# Patient Record
Sex: MALE | Race: WHITE | NOT HISPANIC OR LATINO | Employment: OTHER | URBAN - METROPOLITAN AREA
[De-identification: names, ages, dates, MRNs, and addresses within clinical notes are randomized per-mention and may not be internally consistent; named-entity substitution may affect disease eponyms.]

---

## 2017-07-07 ENCOUNTER — OFFICE VISIT - RIVER FALLS (OUTPATIENT)
Dept: FAMILY MEDICINE | Facility: CLINIC | Age: 62
End: 2017-07-07

## 2017-07-08 LAB
CHOLEST SERPL-MCNC: 233 MG/DL (ref 125–200)
CHOLEST/HDLC SERPL: 3.3 {RATIO}
CREAT SERPL-MCNC: 0.96 MG/DL (ref 0.7–1.25)
GLUCOSE BLD-MCNC: 85 MG/DL (ref 65–99)
HDLC SERPL-MCNC: 71 MG/DL
LDLC SERPL CALC-MCNC: 148 MG/DL
NONHDLC SERPL-MCNC: 162 MG/DL
TRIGL SERPL-MCNC: 69 MG/DL

## 2018-07-10 ENCOUNTER — OFFICE VISIT - RIVER FALLS (OUTPATIENT)
Dept: FAMILY MEDICINE | Facility: CLINIC | Age: 63
End: 2018-07-10

## 2018-07-11 LAB
CHOLEST SERPL-MCNC: 239 MG/DL
CHOLEST/HDLC SERPL: 3.4 {RATIO}
CREAT SERPL-MCNC: 1.01 MG/DL (ref 0.7–1.25)
GLUCOSE BLD-MCNC: 80 MG/DL (ref 65–139)
HDLC SERPL-MCNC: 71 MG/DL
LDLC SERPL CALC-MCNC: 153 MG/DL
NONHDLC SERPL-MCNC: 168 MG/DL
TRIGL SERPL-MCNC: 54 MG/DL

## 2019-01-11 ENCOUNTER — OFFICE VISIT - RIVER FALLS (OUTPATIENT)
Dept: FAMILY MEDICINE | Facility: CLINIC | Age: 64
End: 2019-01-11

## 2019-05-24 ENCOUNTER — OFFICE VISIT - RIVER FALLS (OUTPATIENT)
Dept: FAMILY MEDICINE | Facility: CLINIC | Age: 64
End: 2019-05-24

## 2019-05-24 ASSESSMENT — MIFFLIN-ST. JEOR: SCORE: 1741.52

## 2019-05-25 LAB
BUN SERPL-MCNC: 25 MG/DL (ref 7–25)
BUN/CREAT RATIO - HISTORICAL: NORMAL (ref 6–22)
CALCIUM SERPL-MCNC: 9.3 MG/DL (ref 8.6–10.3)
CHLORIDE BLD-SCNC: 103 MMOL/L (ref 98–110)
CO2 SERPL-SCNC: 27 MMOL/L (ref 20–32)
CREAT SERPL-MCNC: 0.98 MG/DL (ref 0.7–1.25)
EGFRCR SERPLBLD CKD-EPI 2021: 81 ML/MIN/1.73M2
GLUCOSE BLD-MCNC: 83 MG/DL (ref 65–99)
POTASSIUM BLD-SCNC: 4.4 MMOL/L (ref 3.5–5.3)
SODIUM SERPL-SCNC: 138 MMOL/L (ref 135–146)

## 2019-05-26 ENCOUNTER — COMMUNICATION - RIVER FALLS (OUTPATIENT)
Dept: FAMILY MEDICINE | Facility: CLINIC | Age: 64
End: 2019-05-26

## 2019-06-28 ENCOUNTER — COMMUNICATION - RIVER FALLS (OUTPATIENT)
Dept: FAMILY MEDICINE | Facility: CLINIC | Age: 64
End: 2019-06-28

## 2019-12-06 ENCOUNTER — OFFICE VISIT - RIVER FALLS (OUTPATIENT)
Dept: FAMILY MEDICINE | Facility: CLINIC | Age: 64
End: 2019-12-06

## 2019-12-06 ASSESSMENT — MIFFLIN-ST. JEOR: SCORE: 1721.56

## 2020-02-04 ENCOUNTER — COMMUNICATION - RIVER FALLS (OUTPATIENT)
Dept: FAMILY MEDICINE | Facility: CLINIC | Age: 65
End: 2020-02-04

## 2020-06-24 ENCOUNTER — AMBULATORY - HEALTHEAST (OUTPATIENT)
Dept: MULTI SPECIALTY CLINIC | Facility: CLINIC | Age: 65
End: 2020-06-24

## 2020-06-24 ENCOUNTER — OFFICE VISIT - RIVER FALLS (OUTPATIENT)
Dept: FAMILY MEDICINE | Facility: CLINIC | Age: 65
End: 2020-06-24

## 2020-06-24 LAB
CHOLEST SERPL-MCNC: 223 MG/DL
CREAT SERPL-MCNC: 1.01 MG/DL (ref 0.7–1.25)
GFR ESTIMATE EXT - HISTORICAL: 78 ML/MIN/1.73M2
GFR ESTIMATE, IF BLACK EXT - HISTORICAL: 90 ML/MIN/1.73M2
HDLC SERPL-MCNC: 69 MG/DL
LDLC SERPL CALC-MCNC: 139 MG/DL
NON HDL CHOL. (LDL+VLDL): 155
TRIGLYCERIDES (HISTORICAL CONVERSION): 65

## 2020-06-24 ASSESSMENT — MIFFLIN-ST. JEOR: SCORE: 1711.58

## 2020-06-25 ENCOUNTER — COMMUNICATION - RIVER FALLS (OUTPATIENT)
Dept: FAMILY MEDICINE | Facility: CLINIC | Age: 65
End: 2020-06-25

## 2020-06-25 ENCOUNTER — AMBULATORY - HEALTHEAST (OUTPATIENT)
Dept: SURGERY | Facility: TELEHEALTH | Age: 65
End: 2020-06-25

## 2020-06-25 DIAGNOSIS — K40.90 HERNIA, INGUINAL, LEFT: ICD-10-CM

## 2020-06-25 LAB
BUN SERPL-MCNC: 22 MG/DL (ref 7–25)
BUN/CREAT RATIO - HISTORICAL: NORMAL (ref 6–22)
CALCIUM SERPL-MCNC: 9.5 MG/DL (ref 8.6–10.3)
CHLORIDE BLD-SCNC: 104 MMOL/L (ref 98–110)
CHOLEST SERPL-MCNC: 223 MG/DL
CHOLEST/HDLC SERPL: 3.3 {RATIO}
CO2 SERPL-SCNC: 26 MMOL/L (ref 20–32)
CREAT SERPL-MCNC: 1.01 MG/DL (ref 0.7–1.25)
EGFRCR SERPLBLD CKD-EPI 2021: 78 ML/MIN/1.73M2
GLUCOSE BLD-MCNC: 87 MG/DL (ref 65–99)
HDLC SERPL-MCNC: 68 MG/DL
LDLC SERPL CALC-MCNC: 139 MG/DL
NONHDLC SERPL-MCNC: 155 MG/DL
POTASSIUM BLD-SCNC: 4.6 MMOL/L (ref 3.5–5.3)
SODIUM SERPL-SCNC: 140 MMOL/L (ref 135–146)
TRIGL SERPL-MCNC: 65 MG/DL

## 2020-07-02 ENCOUNTER — AMBULATORY - HEALTHEAST (OUTPATIENT)
Dept: SURGERY | Facility: CLINIC | Age: 65
End: 2020-07-02

## 2020-07-02 RX ORDER — LISINOPRIL 5 MG/1
5 TABLET ORAL DAILY
Status: SHIPPED | COMMUNITY
Start: 2020-07-02 | End: 2022-08-02

## 2020-07-02 RX ORDER — CHLORAL HYDRATE 500 MG
3 CAPSULE ORAL DAILY
Status: SHIPPED | COMMUNITY
Start: 2020-07-02

## 2020-07-06 ENCOUNTER — OFFICE VISIT - HEALTHEAST (OUTPATIENT)
Dept: SURGERY | Facility: CLINIC | Age: 65
End: 2020-07-06

## 2020-07-06 ENCOUNTER — SURGERY - HEALTHEAST (OUTPATIENT)
Dept: SURGERY | Facility: CLINIC | Age: 65
End: 2020-07-06

## 2020-07-06 DIAGNOSIS — K40.90 NON-RECURRENT INGUINAL HERNIA WITHOUT OBSTRUCTION OR GANGRENE, UNSPECIFIED LATERALITY: ICD-10-CM

## 2020-07-06 DIAGNOSIS — K40.20 BILATERAL INGUINAL HERNIA WITHOUT OBSTRUCTION OR GANGRENE, RECURRENCE NOT SPECIFIED: ICD-10-CM

## 2020-07-06 RX ORDER — LANOLIN ALCOHOL/MO/W.PET/CERES
400 CREAM (GRAM) TOPICAL
Status: SHIPPED | COMMUNITY
Start: 2020-07-06 | End: 2022-08-02

## 2020-07-08 ENCOUNTER — AMBULATORY - HEALTHEAST (OUTPATIENT)
Dept: SURGERY | Facility: CLINIC | Age: 65
End: 2020-07-08

## 2020-07-08 ENCOUNTER — COMMUNICATION - HEALTHEAST (OUTPATIENT)
Dept: SURGERY | Facility: CLINIC | Age: 65
End: 2020-07-08

## 2020-07-08 ENCOUNTER — COMMUNICATION - RIVER FALLS (OUTPATIENT)
Dept: FAMILY MEDICINE | Facility: CLINIC | Age: 65
End: 2020-07-08

## 2020-07-08 DIAGNOSIS — Z11.59 ENCOUNTER FOR SCREENING FOR OTHER VIRAL DISEASES: ICD-10-CM

## 2020-07-09 ENCOUNTER — AMBULATORY - RIVER FALLS (OUTPATIENT)
Dept: FAMILY MEDICINE | Facility: CLINIC | Age: 65
End: 2020-07-09

## 2020-07-13 ENCOUNTER — COMMUNICATION - HEALTHEAST (OUTPATIENT)
Dept: SCHEDULING | Facility: CLINIC | Age: 65
End: 2020-07-13

## 2020-07-15 ENCOUNTER — AMBULATORY - HEALTHEAST (OUTPATIENT)
Dept: FAMILY MEDICINE | Facility: CLINIC | Age: 65
End: 2020-07-15

## 2020-07-15 DIAGNOSIS — Z11.59 ENCOUNTER FOR SCREENING FOR OTHER VIRAL DISEASES: ICD-10-CM

## 2020-07-16 ENCOUNTER — ANESTHESIA - HEALTHEAST (OUTPATIENT)
Dept: SURGERY | Facility: HOSPITAL | Age: 65
End: 2020-07-16

## 2020-07-16 ASSESSMENT — MIFFLIN-ST. JEOR: SCORE: 1697.98

## 2020-07-17 ENCOUNTER — SURGERY - HEALTHEAST (OUTPATIENT)
Dept: SURGERY | Facility: HOSPITAL | Age: 65
End: 2020-07-17

## 2020-08-03 ENCOUNTER — OFFICE VISIT - HEALTHEAST (OUTPATIENT)
Dept: SURGERY | Facility: CLINIC | Age: 65
End: 2020-08-03

## 2020-08-03 DIAGNOSIS — Z48.89 POSTOPERATIVE VISIT: ICD-10-CM

## 2020-10-05 ENCOUNTER — COMMUNICATION - RIVER FALLS (OUTPATIENT)
Dept: FAMILY MEDICINE | Facility: CLINIC | Age: 65
End: 2020-10-05

## 2021-01-08 ENCOUNTER — OFFICE VISIT - RIVER FALLS (OUTPATIENT)
Dept: FAMILY MEDICINE | Facility: CLINIC | Age: 66
End: 2021-01-08

## 2021-04-27 ENCOUNTER — OFFICE VISIT - RIVER FALLS (OUTPATIENT)
Dept: FAMILY MEDICINE | Facility: CLINIC | Age: 66
End: 2021-04-27

## 2021-04-28 ENCOUNTER — COMMUNICATION - RIVER FALLS (OUTPATIENT)
Dept: FAMILY MEDICINE | Facility: CLINIC | Age: 66
End: 2021-04-28

## 2021-04-28 LAB
BUN SERPL-MCNC: 21 MG/DL (ref 7–25)
BUN/CREAT RATIO - HISTORICAL: NORMAL (ref 6–22)
CALCIUM SERPL-MCNC: 10 MG/DL (ref 8.6–10.3)
CHLORIDE BLD-SCNC: 105 MMOL/L (ref 98–110)
CHOLEST SERPL-MCNC: 228 MG/DL
CHOLEST/HDLC SERPL: 3.3 {RATIO}
CO2 SERPL-SCNC: 27 MMOL/L (ref 20–32)
CREAT SERPL-MCNC: 0.96 MG/DL (ref 0.7–1.25)
EGFRCR SERPLBLD CKD-EPI 2021: 82 ML/MIN/1.73M2
GLUCOSE BLD-MCNC: 95 MG/DL (ref 65–99)
HDLC SERPL-MCNC: 70 MG/DL
LDLC SERPL CALC-MCNC: 143 MG/DL
NONHDLC SERPL-MCNC: 158 MG/DL
POTASSIUM BLD-SCNC: 4.8 MMOL/L (ref 3.5–5.3)
SODIUM SERPL-SCNC: 139 MMOL/L (ref 135–146)
TRIGL SERPL-MCNC: 61 MG/DL

## 2021-06-04 VITALS — WEIGHT: 187 LBS | HEIGHT: 74 IN | BODY MASS INDEX: 24 KG/M2

## 2021-06-09 NOTE — ANESTHESIA CARE TRANSFER NOTE
Last vitals:   Vitals:    07/17/20 1119   BP: 158/82   Pulse: 88   Resp: 12   Temp: 36.1  C (97  F)   SpO2: 100%     Patient's level of consciousness is awake  Spontaneous respirations: yes  Maintains airway independently: yes  Dentition unchanged: yes  Oropharynx: oropharynx clear of all foreign objects    QCDR Measures:  ASA# 20 - Surgical Safety Checklist: WHO surgical safety checklist completed prior to induction    PQRS# 430 - Adult PONV Prevention: 4558F - Pt received => 2 anti-emetic agents (different classes) preop & intraop  ASA# 8 - Peds PONV Prevention: 4558F - Pt received => 2 anti-emetic agents (different classes) preop & intraop  PQRS# 424 - Marybeth-op Temp Management: 4559F - At least one body temp DOCUMENTED => 35.5C or 95.9F within required timeframe  PQRS# 426 - PACU Transfer Protocol: - Transfer of care checklist used  ASA# 14 - Acute Post-op Pain: ASA14B - Patient did NOT experience pain >= 7 out of 10

## 2021-06-09 NOTE — TELEPHONE ENCOUNTER
Spoke with Kar regarding surgery details with Dr. Zimmerman. Confirmed surgery details:    Surgery Date: Friday July 17th - Robotic left inguinal hernia repair    Location:  [facility]    Arrival Time: 7:30 AM (unless instructed otherwise by the preop nurse)    Prep:     1. Schedule a preop physical with your primary care doctor. This may be virtual or face-to-face depending on your doctors preference. Call them right away to schedule this.    2. COVID19 testing is required within 72 hours of surgery. A nurse from [facility] will contact you to arrange this. If the test result is positive, your surgery will be canceled.    3. Nothing to eat or drink for 8 hours before surgery unless instructed differently by the preop nurse.    4. No blood thinners including aspirin for one week prior to surgery. Verify this is safe for you with your primary care doctor before stopping.     5. Adult surgical patients can have one visitor only during the preoperative phase.    6. If you going home the same day as surgery, you need an adult to drive you home and stay with you 24 hours after surgery.      7. When you arrive to the hospital, you will be screened for COVID19 symptoms. If you screen positive, your surgery will need to be postponed for your safety.    8. If the community sees a new surge in COVID19 hospital admissions, your procedure may need to be postponed. We will contact you if this happens.    9. We always encourage you to notify your insurance any time you have something scheduled including surgery. The number is usually right on the back of your insurance card.     Call our office if you have any questions! Thank you!     Jennyfer Hagen  [facility], General Surgery  Surgery Scheduler  [phone] (Direct Line)  [phone] (Main Line)

## 2021-06-09 NOTE — TELEPHONE ENCOUNTER
Kar calling to schedule his pre op covid 19 test. Direct transfer to COVID scheduling team      Natalie Chiu RN  [facility] Nurse Advisor

## 2021-06-09 NOTE — ANESTHESIA POSTPROCEDURE EVALUATION
Patient: Kar López  Procedure(s):  BILATERAL HERNIORRHAPHY, INGUINAL, ROBOT-ASSISTED, LAPAROSCOPIC, USING DA CLAYTON XI (Left)  Anesthesia type: general    Patient location: PACU  Last vitals:   Vitals Value Taken Time   /68 7/17/2020 12:00 PM   Temp 36.6  C (97.9  F) 7/17/2020 11:54 AM   Pulse 64 7/17/2020 12:02 PM   Resp 14 7/17/2020 12:02 PM   SpO2 98 % 7/17/2020 12:02 PM   Vitals shown include unvalidated device data.  Post vital signs: stable  Level of consciousness: alert and conversant  Post-anesthesia pain: pain controlled  Post-anesthesia nausea and vomiting: no  Pulmonary: supplemental oxygen  Cardiovascular: stable and blood pressure at baseline  Hydration: adequate  Anesthetic events: no    QCDR Measures:  ASA# 11 - Marybeth-op Cardiac Arrest: ASA11B - Patient did NOT experience unanticipated cardiac arrest  ASA# 12 - Marybeth-op Mortality Rate: ASA12B - Patient did NOT die  ASA# 13 - PACU Re-Intubation Rate: ASA13B - Patient did NOT require a new airway mgmt  ASA# 10 - Composite Anes Safety: ASA10A - No serious adverse event    Additional Notes:

## 2021-06-09 NOTE — PROGRESS NOTES
HPI:  Kar López is a 64 y.o. male who was referred to me by Demond Tuttle MD for an inguinal hernia. He  presents today with complaints of left inguinal bulge for several months.  He noted the bulge began after falling during the wintertime.  Since then he has had progressive worsening of pain as well as increase in size of the bulge.  Denies any fevers, chills, nausea or vomiting.    Allergies:Patient has no known allergies.    Past Medical History:   Diagnosis Date     Chronic low back pain      DJD (degenerative joint disease)      Hypertension        Past Surgical History:   Procedure Laterality Date     COLONOSCOPY  09/29/2004, 06/28/2012     TOTAL HIP ARTHROPLASTY  1995       CURRENT MEDS:  Current Outpatient Medications   Medication Sig Dispense Refill     bimatoprost (LUMIGAN) 0.01 % Drop Apply 1 drop to eye.       calcium-vitamin D 250-100 mg-unit per tablet Take 1 tablet by mouth daily. Calcium Citrate       cholecalciferol, vitamin D3, (VITAMIN D3) 25 mcg (1,000 unit) capsule Take 1,000 Units by mouth daily.       fish oil-omega-3 fatty acids (FISH OIL) 300-1,000 mg capsule Take 2 g by mouth daily.       folic acid (FOLVITE) 400 MCG tablet Take 400 mcg by mouth.       lisinopriL (PRINIVIL,ZESTRIL) 5 MG tablet Take 5 mg by mouth daily.       multivitamin therapeutic tablet Take 1 tablet by mouth daily.       No current facility-administered medications for this visit.        Family history-reviewed and noncontributory to the current admission     reports that he has never smoked. He has never used smokeless tobacco. He reports current alcohol use.    Review of Systems -   The 12 system review is within normal limits except for as mentioned above.  General ROS: No complaints or constitutional symptoms  Ophthalmic ROS: No complaints of visual changes  Skin: No complaints or symptoms   Endocrine: No complaints or symptoms  Hematologic/Lymphatic: No symptoms or complaints  Psychiatric: No  symptoms or complaints  Respiratory ROS: no cough, shortness of breath, or wheezing  Cardiovascular ROS: no chest pain or dyspnea on exertion  Gastrointestinal ROS: As per HPI  Genito-Urinary ROS: no dysuria, trouble voiding, or hematuria  Musculoskeletal ROS: no joint or muscle pain  Neurological ROS: no TIA or stroke symptoms    There were no vitals taken for this visit.  There is no height or weight on file to calculate BMI.    EXAM:  GENERAL: Well developed male, No acute distress, pleasant and conversant   EYES: Pupils equal, round and reactive, no scleral icterus  CARDIAC: Regular rate and rhythm, no obvious murmurs noted  CHEST/LUNG: Clear to ascultation bilaterally, No ronchi, No wheezes  ABDOMEN: Soft, nontender, palpable left inguinal hernia reducible, no evidence of a right inguinal hernia  SKIN: Pink, warm and dry, no obvious rashes or lesions   NEURO:No focal deficits, ambulatory  MUSCULOSKELETAL:No obvious deformities, no swelling, normal appearing      Assessment/Plan:   Kar López is a 64 y.o. male with a left inguinal hernia.  I have discussed the pathophysiology of inguinal hernias at length as well as the  surgical and non-operative management strategies.      The risks of Robotic, Laparoscopic and open inguinal hernia  surgery were explained in detail which include, but are not limited to, bleeding, infection of the mesh, recurrence of the hernia, chronic pain, poor cosmesis, the need for reoperative intervention, the possibility of conversion from a laparoscopic approach to an open approach, subcutaneous emphysema, injury to vital structures,  blood clots, heart attack, stroke and death.  Additionally, the risks of observation were also discussed in detail which include, but are not limited to, chronic pain, enlargement of the hernia, incarceration, strangulation and death.      He understands everything that was discussed and has consented to proceed with surgery.   We will plan on scheduling  a robotic left possible right inguinal hernia repair at his desired date.       Samuel Zimmerman D.O. [facility]  [phone]  [facility] Department of Surgery

## 2021-06-10 NOTE — PROGRESS NOTES
Kar López is status post robotic inguinal hernia repair. He is doing well with no post operative incisional pain.  He is tolerating a regular diet, ambulating with minimal pain, denies any recurrent bulges and has no other complaints at present.     EXAM:  There were no vitals taken for this visit.  GENERAL: Well developed male, No acute distress, pleasant and conversant   EYES: Pupils equal, round and reactive, no scleral icterus  ABDOMEN: Well-healed port site incisions x3  SKIN: Pink, warm and dry, no obvious rashes or lesions   NEURO:No focal deficits, ambulatory  MUSCULOSKELETAL:No obvious deformities, no swelling, normal appearing      ASSESSMENT AND PLAN:  Kar López is doing well postoperatively.  He may continue with the recommended diet and light activities as tolerated. I have encouraged him to refrain from any heavy lifting over 20 pounds and strenuous activities or stretching for a total of 3 weeks from his surgery.   He may now follow up on a prn basis if he has any other questions or concerns.     Samuel Zimmerman D.O., FACS  [phone]  [facility] Department of Surgery

## 2021-06-15 ENCOUNTER — AMBULATORY - RIVER FALLS (OUTPATIENT)
Dept: FAMILY MEDICINE | Facility: CLINIC | Age: 66
End: 2021-06-15

## 2021-06-16 PROBLEM — K40.20 BILATERAL INGUINAL HERNIA WITHOUT OBSTRUCTION OR GANGRENE, RECURRENCE NOT SPECIFIED: Status: ACTIVE | Noted: 2020-07-08

## 2021-06-16 LAB — SARS-COV-2 RNA RESP QL NAA+PROBE: NEGATIVE

## 2021-07-03 NOTE — ANESTHESIA PREPROCEDURE EVALUATION
Anesthesia Preprocedure Evaluation by Payam Rojas MD at 7/17/2020  8:14 AM     Author: Payam Rojas MD Service: -- Author Type: Physician    Filed: 7/17/2020  8:17 AM Date of Service: 7/17/2020  8:14 AM Status: Signed    : Payam Rojas MD (Physician)       Anesthesia Evaluation      Patient summary reviewed   No history of anesthetic complications     Airway   Mallampati: II  Neck ROM: full   Pulmonary - negative ROS and normal exam                          Cardiovascular - normal exam  Exercise tolerance: > or = 4 METS  (+) hypertension, , hypercholesterolemia,      Neuro/Psych    (+) chronic pain    Endo/Other    (+) arthritis,      GI/Hepatic/Renal - negative ROS      Other findings: Has had multiple hip surgeries, states back on floor he had issues with hypotension. GFR nl.  Covid tyest 7/15 negative.        Dental                             Anesthesia Plan  Planned anesthetic: general endotracheal  Ketamine 0.5 mg/kg after induction.  ASA 2   Induction: intravenous   Anesthetic plan and risks discussed with: patient  Anesthesia plan special considerations: antiemetics,   Post-op plan: routine recovery

## 2021-08-03 ENCOUNTER — OFFICE VISIT - RIVER FALLS (OUTPATIENT)
Dept: FAMILY MEDICINE | Facility: CLINIC | Age: 66
End: 2021-08-03

## 2021-08-03 ASSESSMENT — MIFFLIN-ST. JEOR: SCORE: 1731.54

## 2021-10-26 ENCOUNTER — OFFICE VISIT - RIVER FALLS (OUTPATIENT)
Dept: FAMILY MEDICINE | Facility: CLINIC | Age: 66
End: 2021-10-26

## 2021-10-26 ASSESSMENT — MIFFLIN-ST. JEOR: SCORE: 1727

## 2022-02-07 ENCOUNTER — COMMUNICATION - RIVER FALLS (OUTPATIENT)
Dept: FAMILY MEDICINE | Facility: CLINIC | Age: 67
End: 2022-02-07
Payer: COMMERCIAL

## 2022-02-12 VITALS
SYSTOLIC BLOOD PRESSURE: 100 MMHG | WEIGHT: 192 LBS | HEIGHT: 75 IN | BODY MASS INDEX: 23.87 KG/M2 | HEART RATE: 61 BPM | DIASTOLIC BLOOD PRESSURE: 65 MMHG

## 2022-02-12 VITALS
TEMPERATURE: 97.1 F | DIASTOLIC BLOOD PRESSURE: 80 MMHG | BODY MASS INDEX: 24.39 KG/M2 | SYSTOLIC BLOOD PRESSURE: 132 MMHG | HEART RATE: 51 BPM | WEIGHT: 190 LBS

## 2022-02-12 VITALS
BODY MASS INDEX: 23.85 KG/M2 | TEMPERATURE: 97.1 F | WEIGHT: 190.8 LBS | SYSTOLIC BLOOD PRESSURE: 127 MMHG | HEART RATE: 61 BPM | DIASTOLIC BLOOD PRESSURE: 75 MMHG

## 2022-02-12 VITALS
BODY MASS INDEX: 23.6 KG/M2 | SYSTOLIC BLOOD PRESSURE: 148 MMHG | HEART RATE: 65 BPM | HEIGHT: 75 IN | WEIGHT: 189.8 LBS | TEMPERATURE: 97.5 F | DIASTOLIC BLOOD PRESSURE: 73 MMHG

## 2022-02-12 VITALS
HEIGHT: 75 IN | SYSTOLIC BLOOD PRESSURE: 128 MMHG | BODY MASS INDEX: 24.15 KG/M2 | WEIGHT: 194.2 LBS | DIASTOLIC BLOOD PRESSURE: 75 MMHG | TEMPERATURE: 97.3 F | HEART RATE: 56 BPM

## 2022-02-12 VITALS
HEART RATE: 56 BPM | SYSTOLIC BLOOD PRESSURE: 145 MMHG | TEMPERATURE: 97 F | DIASTOLIC BLOOD PRESSURE: 84 MMHG | WEIGHT: 192.1 LBS | BODY MASS INDEX: 24.66 KG/M2

## 2022-02-12 VITALS
SYSTOLIC BLOOD PRESSURE: 118 MMHG | HEIGHT: 75 IN | WEIGHT: 187.6 LBS | BODY MASS INDEX: 23.32 KG/M2 | HEART RATE: 72 BPM | TEMPERATURE: 97.5 F | DIASTOLIC BLOOD PRESSURE: 71 MMHG

## 2022-02-12 VITALS
TEMPERATURE: 98 F | HEART RATE: 59 BPM | SYSTOLIC BLOOD PRESSURE: 115 MMHG | BODY MASS INDEX: 24.42 KG/M2 | DIASTOLIC BLOOD PRESSURE: 69 MMHG | WEIGHT: 195.4 LBS

## 2022-02-12 VITALS
BODY MASS INDEX: 24.22 KG/M2 | TEMPERATURE: 97.7 F | DIASTOLIC BLOOD PRESSURE: 67 MMHG | HEART RATE: 65 BPM | SYSTOLIC BLOOD PRESSURE: 119 MMHG | WEIGHT: 193.8 LBS

## 2022-02-12 VITALS
HEIGHT: 75 IN | HEART RATE: 54 BPM | SYSTOLIC BLOOD PRESSURE: 138 MMHG | BODY MASS INDEX: 23.75 KG/M2 | DIASTOLIC BLOOD PRESSURE: 81 MMHG | WEIGHT: 191 LBS

## 2022-02-15 NOTE — PROGRESS NOTES
Patient:   SVETLANA MARCOS            MRN: [id number]            FIN: [id number]               Age:   65 years     Sex:  Male     :  1955   Associated Diagnoses:   Glaucoma   Author:   Demond Tuttle MD      Preoperative Information   Indication for surgery:  Glaucoma.    Accompanied by:  No one.    Source of history:  Self.    History limitation:  None.       Chief Complaint   2021 10:45 AM CDT   Preop.  Micropulse Surgery.  21.  [facility].        Review of Systems   Constitutional:  Negative.    Eye:  Negative, glaucoma.    Ear/Nose/Mouth/Throat:  Negative.    Respiratory:  Negative.    Cardiovascular:  Negative.    Gastrointestinal:  Negative.    Genitourinary:  Negative.    Hematology/Lymphatics:  Negative.    Endocrine:  Negative.    Immunologic:  Negative.    Musculoskeletal:  Negative.    Integumentary:  Negative.    Neurologic:  Negative.       Health Status   Allergies:    Allergic Reactions (Selected)  No Known Medication Allergies   Medications:  (Selected)   Prescriptions  Prescribed  lisinopril 5 mg oral tablet: = 1 tab(s), Oral, daily, # 90 tab(s), 1 Refill(s), Type: Maintenance, Pharmacy: [id number], 1 tab(s) Oral daily, 75, in, 20 10:42:00 CDT, Height Measured, 190, lb, 21 10:46:00 CST, Weight Measured  Documented Medications  Documented  Daily Multiple Vitamins: PO, Daily, 0  Fish Oil oral capsule: 0  Flax Seed Oil: 0  Lutein: PO, Daily, 0  Rocklatan 0.02%-0.005% ophthalmic solution: 1 drop(s), Eye-Left, qpm, 0 Refill(s), Type: Maintenance  Simbrinza 1%- 0.2% ophthalmic suspension: 1 drop(s), Eye-Left, bid  Vitamin D: ( 1,000 unit(s) ), PO, 0  calcium (as calcium citrate) 250 mg oral tablet: = 1 tab(s) ( 250 mg ), Oral, daily, 0 Refill(s), Type: Maintenance,    Medications          *denotes recorded medication          *Simbrinza 1%- 0.2% ophthalmic suspension: 1 drop(s), Eye-Left, bid.          *calcium (as calcium citrate) 250 mg oral  tablet: 250 mg, 1 tab(s), Oral, daily, 0 Refill(s).          *Vitamin D: 1,000 unit(s), PO.          *Flax Seed Oil: 0.          *Rocklatan 0.02%-0.005% ophthalmic solution: 1 drop(s), Eye-Left, qpm, 0 Refill(s).          lisinopril 5 mg oral tablet: 1 tab(s), Oral, daily, 90 tab(s), 1 Refill(s).          *Lutein: PO, Daily.          *Daily Multiple Vitamins: PO, Daily.          *Fish Oil oral capsule: 0.       Problem list:    All Problems  Benign essential HTN / SNOMED CT 9040687 / Confirmed  Chronic Low Back Pain / ICD-9-.2 / Confirmed  DJD (Degenerative Joint Disease) of Lumbar Spine / ICD-9-.3 / Confirmed  Elevated lipids / SNOMED CT 209116227 / Confirmed  Statin declined / SNOMED CT 323391106 / Confirmed      Histories   Past Medical History:    Active  Benign essential HTN (4044653): Onset on 2011 at 55 years.  Chronic Low Back Pain (724.2): Onset on 2010 at 55 years.  DJD (Degenerative Joint Disease) of Lumbar Spine (721.3)   Family History:    HEADACHE  Mother  Sister  CVA - Cerebrovascular accident  Mother  PVD - Peripheral vascular disease  Father ()     Procedure history:    Repair of inguinal hernia - Bilateral (SNOMED CT 63758498) on 2020 at 64 Years.  Colonoscopy (SNOMED CT 739486258) on 2012 at 56 Years.  Comments:  7/10/2012 2:06 PM CDT - Daphne Lino MA  Normal colonoscopy repeat in 10 years  Colonoscopy (SNOMED CT 963905460) on 2004 at 48 Years.  Comments:  2010 11:08 AM LILYT - Lucia Middleton  rev. 2009  Total hip replacement (SNOMED CT 129860724) in  at 40 Years.  Comments:  2010 11:07 AM SHELLEY - Lucia Middleton  right hip - redue  Total hip replacement (SNOMED CT 145079965) in  at 32 Years.  Comments:  2010 11:07 AM LILYT - Lucia Middleton  right hip   Social History:        Electronic Cigarette/Vaping Assessment            Electronic Cigarette Use: Never.      Alcohol Assessment: Current      Tobacco  Assessment            Never (less than 100 in lifetime)      Exercise and Physical Activity Assessment            Exercise frequency: Active.       Has  no history of anemia.  Has no personal history of bleeding problems.   Has no personal or family history of anesthesia reactions.  S/he has not taken aspirin or aspirin containing products in the last week.     S/he  has not been on corticosteroids for more than 2 weeks recently.    Chest pain / SOB walking up 2 flights of steps?no  CAD MI?no    Asthma  or Bronchitis? no  Diabetes? no       Insulin/Orals?          Physical Examination   Vital Signs   4/27/2021 10:45 AM CDT Temperature Tympanic 97.0 DegF  LOW    Peripheral Pulse Rate 56 bpm  LOW    HR Method Electronic    Systolic Blood Pressure 145 mmHg  HI    Diastolic Blood Pressure 84 mmHg  HI    Mean Arterial Pressure 104 mmHg    BP Method Electronic      Measurements from flowsheet : Measurements   4/27/2021 10:45 AM CDT   Weight Measured - Standard                192.1 lb     General:  Alert and oriented, No acute distress.    Eye:  Pupils are equal, round and reactive to light, Extraocular movements are intact.    HENT:  Normocephalic, Tympanic membranes are clear, Oral mucosa is moist.    Neck:  Supple, Non-tender, No lymphadenopathy, No thyromegaly.    Respiratory:  Lungs are clear to auscultation, Respirations are non-labored, Breath sounds are equal.    Cardiovascular:  Normal rate, Regular rhythm, No murmur, Good pulses equal in all extremities, No edema.    Gastrointestinal:  Soft.    Musculoskeletal:  No tenderness, No swelling, No deformity.    Integumentary:  Warm, Dry.    Neurologic:  Alert, Oriented, No focal deficits, Cranial Nerves II-XII are grossly intact.    Psychiatric:  Cooperative, Appropriate mood & affect, Normal judgment.       Review / Management            Impression and Plan   Diagnosis     Glaucoma ([id number] H40.9).     Condition:  ok for surgery asa2.

## 2022-02-15 NOTE — NURSING NOTE
Comprehensive Intake Entered On:  4/27/2021 10:51 AM CDT    Performed On:  4/27/2021 10:45 AM CDT by Kimmy Drummond               Summary   Chief Complaint :   Preop.  Micropulse Surgery.  4/30/21.  [facility].    Weight Measured :   192.1 lb(Converted to: 192 lb 2 oz, 87.135 kg)    Systolic Blood Pressure :   145 mmHg (HI)    Diastolic Blood Pressure :   84 mmHg (HI)    Mean Arterial Pressure :   104 mmHg   Peripheral Pulse Rate :   56 bpm (LOW)    BP Method :   Electronic   HR Method :   Electronic   Temperature Tympanic :   97.0 DegF(Converted to: 36.1 DegC)  (LOW)    Kimmy Drummond - 4/27/2021 10:45 AM CDT   Health Status   Allergies Verified? :   Yes   Medication History Verified? :   Yes   Pre-Visit Planning Status :   Completed   Tobacco Use? :   Never smoker   Kimmy Drummond - 4/27/2021 10:45 AM CDT   Meds / Allergies   (As Of: 4/27/2021 10:51:43 AM CDT)   Allergies (Active)   No Known Medication Allergies  Estimated Onset Date:   Unspecified ; Created By:   Jesika Hale CMA; Reaction Status:   Active ; Category:   Drug ; Substance:   No Known Medication Allergies ; Type:   Allergy ; Updated By:   Jesika Hale CMA; Reviewed Date:   1/8/2021 11:20 AM CST        Medication List   (As Of: 4/27/2021 10:51:43 AM CDT)   Prescription/Discharge Order    lisinopril  :   lisinopril ; Status:   Prescribed ; Ordered As Mnemonic:   lisinopril 5 mg oral tablet ; Simple Display Line:   1 tab(s), Oral, daily, 90 tab(s), 1 Refill(s) ; Ordering Provider:   Demond Tuttle MD; Catalog Code:   lisinopril ; Order Dt/Tm:   1/8/2021 11:19:14 AM CST            Home Meds    brimonidine-brinzolamide ophthalmic  :   brimonidine-brinzolamide ophthalmic ; Status:   Documented ; Ordered As Mnemonic:   Simbrinza 1%- 0.2% ophthalmic suspension ; Simple Display Line:   1 drop(s), Eye-Left, bid ; Catalog Code:   brimonidine-brinzolamide ophthalmic ; Order Dt/Tm:   4/27/2021 10:48:38 AM CDT           latanoprost-netarsudil ophthalmic  :   latanoprost-netarsudil ophthalmic ; Status:   Documented ; Ordered As Mnemonic:   Rocklatan 0.02%-0.005% ophthalmic solution ; Simple Display Line:   1 drop(s), Eye-Left, qpm, 0 Refill(s) ; Catalog Code:   latanoprost-netarsudil ophthalmic ; Order Dt/Tm:   4/27/2021 10:48:38 AM CDT          calcium citrate  :   calcium citrate ; Status:   Documented ; Ordered As Mnemonic:   calcium (as calcium citrate) 250 mg oral tablet ; Simple Display Line:   250 mg, 1 tab(s), Oral, daily, 0 Refill(s) ; Catalog Code:   calcium citrate ; Order Dt/Tm:   12/6/2019 2:22:31 PM CST          ergocalciferol  :   ergocalciferol ; Status:   Documented ; Ordered As Mnemonic:   Vitamin D ; Simple Display Line:   1,000 unit(s), PO ; Catalog Code:   ergocalciferol ; Order Dt/Tm:   2/9/2010 6:07:01 PM CST          lutein  :   lutein ; Status:   Documented ; Ordered As Mnemonic:   Lutein ; Simple Display Line:   PO, Daily ; Catalog Code:   lutein ; Order Dt/Tm:   2/9/2010 6:07:42 PM CST          flax  :   flax ; Status:   Documented ; Ordered As Mnemonic:   Flax Seed Oil ; Catalog Code:   flax ; Order Dt/Tm:   2/9/2010 6:06:30 PM CST          omega-3 polyunsaturated fatty acids  :   omega-3 polyunsaturated fatty acids ; Status:   Documented ; Ordered As Mnemonic:   Fish Oil oral capsule ; Catalog Code:   omega-3 polyunsaturated fatty acids ; Order Dt/Tm:   2/9/2010 6:06:06 PM CST          multivitamin  :   multivitamin ; Status:   Documented ; Ordered As Mnemonic:   Daily Multiple Vitamins ; Simple Display Line:   PO, Daily ; Catalog Code:   multivitamin ; Order Dt/Tm:   1/28/2010 1:48:22 PM CST            ID Risk Screen   Recent Travel History :   No recent travel   Family Member Travel History :   No recent travel   Other Exposure to Infectious Disease :   Unknown   COVID-19 Testing Status :   No positive COVID-19 test   Kimmy Drummond - 4/27/2021 10:45 AM CDT   Social History   Social History   (As  Of: 4/27/2021 10:51:43 AM CDT)   Alcohol:  Current      (Last Updated: 4/23/2010 11:08:37 AM CDT by Lucia Robertson CMA )         Tobacco:        Never (less than 100 in lifetime)   (Last Updated: 1/8/2021 10:47:12 AM CST by Jesika Hale CMA)          Electronic Cigarette/Vaping:        Electronic Cigarette Use: Never.   (Last Updated: 1/8/2021 10:47:21 AM CST by Jesika Hale CMA)          Exercise:        Exercise frequency: Active.   (Last Updated: 12/6/2011 1:00:21 PM CST by Jayla Sepulveda)

## 2022-02-15 NOTE — PROGRESS NOTES
Patient:   SVETLANA MARCOS            MRN: [id number]            FIN: [id number]               Age:   63 years     Sex:  Male     :  1955   Associated Diagnoses:   Glaucoma   Author:   Demond Tuttle MD      Preoperative Information   Indication for surgery:  Glaucoma left eye.       Chief Complaint   2019 3:56 PM CDT    Preop.  Left eye surgery. Dr. Watts.  [facility] 19        Review of Systems   Constitutional:  Negative.    Eye:  Negative.    Ear/Nose/Mouth/Throat:  Negative.    Respiratory:  Negative.    Cardiovascular:  Negative.    Gastrointestinal:  Negative.    Genitourinary:  Negative.    Hematology/Lymphatics:  Negative.    Endocrine:  Negative.    Immunologic:  Negative.    Musculoskeletal:  Negative.    Integumentary:  Negative.    Neurologic:  Negative.       Health Status   Allergies:    Allergic Reactions (Selected)  No known allergies   Medications:  (Selected)   Prescriptions  Prescribed  lisinopril 5 mg oral tablet: = 1 tab(s) ( 5 mg ), po, bid, # 180 tab(s), 1 Refill(s), Type: Maintenance, Pharmacy: [id number], 1 tab(s) Oral bid  Documented Medications  Documented  Daily Multiple Vitamins: PO, Daily, 0  Fish Oil oral capsule: 0  Flax Seed Oil: 0  Lumigan 0.01% ophthalmic solution: 1 drop(s), left eye, qpm, 0 Refill(s), Type: Maintenance  Lutein: PO, Daily, 0  Vitamin D: ( 1,000 unit(s) ), PO, 0  amoxicillin 500 mg oral tablet: See Instructions, Instructions: 4 tabs 1 hr prior to dental work., 0 Refill(s), Type: Soft Stop,    Medications          *denotes recorded medication          *amoxicillin 500 mg oral tablet: See Instructions, 4 tabs 1 hr prior to dental work..          *Lumigan 0.01% ophthalmic solution: 1 drop(s), left eye, qpm.          *Vitamin D: 1,000 unit(s), PO.          *Flax Seed Oil: 0.          lisinopril 5 mg oral tablet: 5 mg, 1 tab(s), po, bid, 180 tab(s), 1 Refill(s).          *Lutein: PO, Daily.          *Daily Multiple  Vitamins: PO, Daily.          *Fish Oil oral capsule: 0.     Problem list:    All Problems  Benign essential HTN / SNOMED CT 8125703 / Confirmed  Chronic Low Back Pain / ICD-9-.2 / Confirmed  DJD (Degenerative Joint Disease) of Lumbar Spine / ICD-9-.3 / Confirmed      Histories   Past Medical History:    Active  Benign essential HTN (9195470): Onset on 2011 at 55 years.  Chronic Low Back Pain (724.2): Onset on 2010 at 55 years.  DJD (Degenerative Joint Disease) of Lumbar Spine (721.3)   Family History:    HEADACHE  Mother  Sister  CVA - Cerebrovascular accident  Mother  PVD - Peripheral vascular disease  Father ()     Procedure history:    Colonoscopy (SNOMED CT 569637202) on 2012 at 56 Years.  Comments:  7/10/2012 2:06 PM CDT - Daphne Lino MA  Normal colonoscopy repeat in 10 years  Colonoscopy (SNOMED CT 142680416) on 2004 at 48 Years.  Comments:  2010 11:08 AM CDT - Lucia Middleton  rev. 2009  Total hip replacement (SNOMED CT 254418902) in  at 40 Years.  Comments:  2010 11:07 AM CDT - Lucia Middleton  right hip - redue  Total hip replacement (SNOMED CT 663002444) in  at 32 Years.  Comments:  2010 11:07 AM CDT - Lucia Middleton  right hip   Social History:        Alcohol Assessment: Current      Tobacco Assessment: Denies Tobacco Use      Exercise and Physical Activity Assessment            Exercise frequency: Active.     Has  no history of anemia.  Has no personal history of bleeding problems.   Has no personal or family history of anesthesia reactions.  S/he has not taken aspirin or aspirin containing products in the last week.     S/he has / has not been on corticosteroids for more than 2 weeks recently.    Chest pain / SOB walking up 2 flights of steps?no  CAD MI?no    Asthma  or Bronchitis? no  Diabetes? no       Insulin/Orals?          Physical Examination   Vital Signs   2019 3:56 PM CDT Temperature Tympanic 97.3 DegF   LOW    Peripheral Pulse Rate 56 bpm  LOW    HR Method Electronic    Systolic Blood Pressure 128 mmHg    Diastolic Blood Pressure 75 mmHg    Mean Arterial Pressure 93 mmHg    BP Method Electronic      Measurements from flowsheet : Measurements   5/24/2019 3:56 PM CDT Height Measured - Standard 75 in    Weight Measured - Standard 194.2 lb    BSA 2.16 m2    Body Mass Index 24.27 kg/m2      General:  Alert and oriented, No acute distress.    Eye:  Pupils are equal, round and reactive to light, Extraocular movements are intact.    HENT:  Normocephalic, Tympanic membranes are clear, Normal hearing, Oral mucosa is moist.    Neck:  Supple, Non-tender, No carotid bruit, No jugular venous distention, No lymphadenopathy, No thyromegaly.    Respiratory:  Lungs are clear to auscultation, Respirations are non-labored, Breath sounds are equal.    Cardiovascular:  Normal rate, Regular rhythm, No murmur, Good pulses equal in all extremities, No edema.    Gastrointestinal:  Soft, Non-tender, Non-distended, Normal bowel sounds, No organomegaly.    Musculoskeletal:  No tenderness, No swelling, No deformity.    Integumentary:  Warm, Dry.    Neurologic:  Alert, Oriented, Normal sensory, Normal motor function, No focal deficits, Cranial Nerves II-XII are grossly intact, Normal deep tendon reflexes.    Psychiatric:  Cooperative, Appropriate mood & affect, Normal judgment.       Review / Management            Impression and Plan   Diagnosis     Glaucoma (YAG69-ZH H40.9).     Condition:  ok for surgery asa2.

## 2022-02-15 NOTE — LETTER
(Inserted Image. Unable to display)   June 25, 2020      SVETLANA MARCOS      [address]   [place], [place] [id number]        Dear SVETLANA,    Thank you for selecting [facility] for your healthcare needs.  Below you will find the results of the recent tests done at our clinic.     All labs acceptable.      Result Name Current Result Previous Result Reference Range   Potassium Level (mmol/L)  4.6 6/24/2020  4.4 5/24/2019 3.5 - 5.3   Glucose Level (mg/dL)  87 6/24/2020  83 5/24/2019 65 - 99   Creatinine Level (mg/dL)  1.01 6/24/2020  0.98 5/24/2019 0.70 - 1.25   Cholesterol (mg/dL) ((H)) 223 6/24/2020 ((H)) 239 7/10/2018  - <200   HDL (mg/dL)  68 6/24/2020  71 7/10/2018 > OR = 40 -    LDL ((H)) 139 6/24/2020 ((H)) 153 7/10/2018    Triglyceride (mg/dL)  65 6/24/2020  54 7/10/2018  - <150       Please contact me or my assistant at [phone] if you have any questions.     Sincerely,        Demond Tuttle MD        What do your labs mean?  Below is a glossary of commonly ordered labs:  LDL   Bad Cholesterol   HDL   Good Cholesterol  AST/ALT   Liver Function   Cr/Creatinine   Kidney Function  Microalbumin   Kidney Function  BUN   Kidney Function  PSA   Prostate    TSH   Thyroid Hormone  HgbA1c   Diabetes Test   Hgb (Hemoglobin)   Red Blood Cells

## 2022-02-15 NOTE — NURSING NOTE
Quick Intake Entered On:  8/3/2021 2:56 PM CDT    Performed On:  8/3/2021 2:55 PM CDT by Demond Tuttle MD               Summary   Height Measured :   75 in(Converted to: 6 ft 3 in, 190.50 cm)    Systolic Blood Pressure :   100 mmHg   Diastolic Blood Pressure :   65 mmHg   Mean Arterial Pressure :   77 mmHg   Vital Signs Comments :   home   Demond Tuttle MD - 8/3/2021 2:55 PM CDT

## 2022-02-15 NOTE — PROGRESS NOTES
Patient:   SVETLANA MARCOS            MRN: [id number]            FIN: [id number]               Age:   61 years     Sex:  Male     :  1955   Associated Diagnoses:   Chronic Low Back Pain; HTN   Author:   Demond Tuttle MD      Visit Information      Date of Service: 2017 04:23 pm  Performing Location: [facility]  Encounter#: [id number]      Primary Care Provider (PCP):  Demond Tuttle MD    NPI# [id number]      Referring Provider:  Demond Tuttle MD    NPI# [id number]      Chief Complaint   2017 4:25 PM CDT     HTN checkup.        History of Present Illness   Overall things have been stable. His significant other is a nurse her name is Dottie    The patient is in today for his back.  and htn. chief complaint and symptoms as noted above confirmed with patient ..  He has the long-term back problems from previous injuries.  He has gone through [facility] twice   ..  He continues to mckoy it.  Tolerating ace well  .  He has had no other associated symptoms.        Review of Systems   Constitutional:  No weakness, No fatigue, No decreased activity, No weight gain.    Eye:  No recent visual problem, No blurring, No double vision.    Ear/Nose/Mouth/Throat:  Negative.    Respiratory:  No shortness of breath, No cough.    Cardiovascular:  No chest pain, No peripheral edema.    Gastrointestinal:  No nausea, No vomiting, No diarrhea, No constipation, No abdominal pain.    Genitourinary:  No dysuria, No hematuria, No change in urine stream.    Hematology/Lymphatics:  Negative.    Endocrine:  Negative.    Immunologic:  Negative.    Musculoskeletal:  Negative except as documented in history of present illness.    Integumentary:  No rash.    Neurologic:  Alert and oriented X4.    Psychiatric:  Negative.             Health Status   Allergies:    Allergic Reactions (Selected)  No known allergies   Medications:  (Selected)   Prescriptions  Prescribed  lisinopril 5 mg oral  tablet: 1 tab(s) ( 5 mg ), po, bid, # 180 tab(s), 3 Refill(s), Type: Maintenance, Pharmacy: [id number], 1 tab(s) po bid  Documented Medications  Documented  Daily Multiple Vitamins: PO, Daily, 0  Fish Oil oral capsule: 0  Flax Seed Oil: 0  Lumigan 0.01% ophthalmic solution: 1 drop(s), left eye, qpm, 0 Refill(s), Type: Maintenance  Lutein: PO, Daily, 0  Vitamin D: ( 1,000 unit(s) ), PO, 0  amoxicillin 500 mg oral tablet: See Instructions, Instructions: 4 tabs 1 hr prior to dental work., 0 Refill(s), Type: Soft Stop   Problem list:    All Problems  DJD (Degenerative Joint Disease) of Lumbar Spine / ICD-9-.3 / Confirmed  Chronic Low Back Pain / ICD-9-.2 / Confirmed  Benign essential HTN / SNOMED CT 0783417 / Confirmed      Histories   Past Medical History:    Active  Benign essential HTN (3722978): Onset on 2011 at 55 years.  Chronic Low Back Pain (724.2): Onset on 2010 at 55 years.  DJD (Degenerative Joint Disease) of Lumbar Spine (721.3)   Family History:    HEADACHE  Mother  Sister  CVA - Cerebrovascular accident  Mother  PVD - Peripheral vascular disease  Father ()     Procedure history:    Colonoscopy (SNOMED CT 841186660) on 2012 at 56 Years.  Comments:  7/10/2012 2:06 PM - Daphne Lino MA  Normal colonoscopy repeat in 10 years  Colonoscopy (SNOMED CT 773666753) on 2004 at 48 Years.  Comments:  2010 11:08 AM - Lucia Middleton  rev. 2009  Total hip replacement (SNOMED CT 629127210) in  at 40 Years.  Comments:  2010 11:07 AM - Lucia Middleton  right hip - redue  Total hip replacement (SNOMED CT 807252696) in  at 32 Years.  Comments:  2010 11:07 AM - Lucia Middleton  right hip   Social History:        Alcohol Assessment: Current      Tobacco Assessment: Denies Tobacco Use      Exercise and Physical Activity Assessment            Exercise frequency: Active.        Physical Examination   Vital Signs   2017 4:25 PM CDT  Temperature Tympanic 98.0 DegF    Peripheral Pulse Rate 59 bpm  LOW    Systolic Blood Pressure 115 mmHg    Diastolic Blood Pressure 69 mmHg    Mean Arterial Pressure 84 mmHg      Measurements from flowsheet : Measurements   7/7/2017 4:25 PM CDT     Weight Measured - Standard                195.4 lb     General:  Alert and oriented, No acute distress.    Eye:  Normal conjunctiva.    HENT:  No pharyngeal erythema.    Neck:  Supple, No carotid bruit, No lymphadenopathy, No thyromegaly.    Respiratory:  Lungs are clear to auscultation, Respirations are non-labored.    Cardiovascular:  Normal rate, Regular rhythm, No murmur, Normal peripheral perfusion, No edema.    Gastrointestinal:  Soft, Non-tender, No organomegaly.    Genitourinary:  No costovertebral angle tenderness.    Musculoskeletal:  degenerative changes noted.    Integumentary:  No rash.    Neurologic:  Alert, Oriented.       Review / Management   Results review      Impression and Plan   Diagnosis     Chronic Low Back Pain (PTQ63-IX M54.5).     HTN (BGI00-NZ I10).     Course:  Progressing as expected.    Plan:       Follow-up: With Primary Care Provider, In 12 months.    Patient Instructions:       Counseled: Patient, Regarding diagnosis, Regarding treatment, Regarding medications, Diet, Activity, Verbalized understanding.

## 2022-02-15 NOTE — PROGRESS NOTES
Patient:   SVETLANA MARCOS            MRN: [id number]            FIN: [id number]               Age:   64 years     Sex:  Male     :  1955   Associated Diagnoses:   Chronic Low Back Pain; HTN; Hernia, inguinal, left   Author:   Demond Tuttle MD      Visit Information      Date of Service: 2020 10:36 am  Performing Location: [facility]  Encounter#: [id number]      Primary Care Provider (PCP):  Demond Tuttle MD    NPI# [id number]      Referring Provider:  Demond Tuttle MD, NPI# [id number]      Chief Complaint      History of Present Illness   Overall things have been stable. Now retired Here for 6-month follow-up.  He is now retired.  Struggles a bit with feeling a bit lost now that he is retired.  Specially with the pandemic.  His significant other Dottie continues work full-time as a nurse.  Blood pressures been controlled.  Still struggles with his back from the arthritis.  He does have leg length discrepancy which he manages with a insert.  He did fall on the ice last winter and is noted a left inguinal lump since then leg is bigger but not too painful.         Review of Systems   Constitutional:  No weakness, No fatigue, No decreased activity, No weight gain.    Eye:  No recent visual problem, No blurring, No double vision.    Ear/Nose/Mouth/Throat:  Negative.    Respiratory:  No shortness of breath, No cough.    Cardiovascular:  No chest pain, No peripheral edema.    Gastrointestinal:  Negative except as documented in history of present illness, No nausea, No vomiting, No diarrhea, No constipation, No abdominal pain.    Genitourinary:  No dysuria, No hematuria, No change in urine stream.    Hematology/Lymphatics:  Negative.    Endocrine:  Negative.    Immunologic:  Negative.    Musculoskeletal:  Negative except as documented in history of present illness.    Integumentary:  No rash.    Neurologic:  Alert and oriented X4.    Psychiatric:  Negative.              Health Status    Allergies:    Allergic Reactions (Selected)  No known allergies   Medications:  (Selected)   Prescriptions  Prescribed  lisinopril 5 mg oral tablet: = 1 tab(s) ( 5 mg ), po, daily, # 180 tab(s), 1 Refill(s), Type: Maintenance, Pharmacy: [facility]  Documented Medications  Documented  Daily Multiple Vitamins: PO, Daily, 0  Fish Oil oral capsule: 0  Flax Seed Oil: 0  Lutein: PO, Daily, 0  Vitamin D: ( 1,000 unit(s) ), PO, 0  calcium (as calcium citrate) 250 mg oral tablet: = 1 tab(s) ( 250 mg ), Oral, daily, 0 Refill(s), Type: Maintenance   Problem list:    All Problems  Benign essential HTN / SNOMED CT 8944912 / Confirmed  Chronic Low Back Pain / ICD-9-.2 / Confirmed  DJD (Degenerative Joint Disease) of Lumbar Spine / ICD-9-.3 / Confirmed      Histories   Past Medical History:    Active  Benign essential HTN (4331193): Onset on 2011 at 55 years.  Chronic Low Back Pain (724.2): Onset on 2010 at 55 years.  DJD (Degenerative Joint Disease) of Lumbar Spine (721.3)   Family History:    HEADACHE  Mother  Sister  CVA - Cerebrovascular accident  Mother  PVD - Peripheral vascular disease  Father ()     Procedure history:    Colonoscopy (SNOMED CT 519048719) on 2012 at 56 Years.  Comments:  7/10/2012 2:06 PM CDT - Daphne Lino MA  Normal colonoscopy repeat in 10 years  Colonoscopy (SNOMED CT 596999252) on 2004 at 48 Years.  Comments:  2010 11:08 AM LILYT - Lucia Middleton  rev. 2009  Total hip replacement (SNOMED CT 828805251) in  at 40 Years.  Comments:  2010 11:07 AM LILYT - Lucia Middleton  right hip - redue  Total hip replacement (SNOMED CT 895927984) in  at 32 Years.  Comments:  2010 11:07 AM SHELLEY - Lucia Middleton  right hip   Social History:        Alcohol Assessment: Current      Tobacco Assessment: Denies Tobacco Use      Exercise and Physical Activity Assessment            Exercise frequency: Active.        Physical Examination    VS/Measurements   General:  Alert and oriented, No acute distress.    Eye:  Normal conjunctiva.    HENT:  No pharyngeal erythema.    Neck:  Supple, No carotid bruit, No lymphadenopathy, No thyromegaly.    Respiratory:  Lungs are clear to auscultation, Respirations are non-labored.    Cardiovascular:  Normal rate, Regular rhythm, No murmur, Normal peripheral perfusion, No edema.    Gastrointestinal:  Soft, Non-tender, No organomegaly, lih.    Genitourinary:  No costovertebral angle tenderness.    Musculoskeletal:  degenerative changes noted.    Integumentary:  No rash.    Neurologic:  Alert, Oriented.       Review / Management   Results review      Impression and Plan   Diagnosis     Chronic Low Back Pain (BXY29-RO M54.5).     HTN (PVB74-GQ I10).     Hernia, inguinal, left (BGJ36-PG K40.90).     Course:  Progressing as expected.    Plan:  Overall doing well we will plan on seeing him back in 6 months.  We will put in surgical referral for the hernia.  Continue with medications as directed  .    Patient Instructions:       Counseled: Patient, Regarding diagnosis, Regarding treatment, Regarding medications, Diet, Activity, Verbalized understanding.    Addendum: Patient is cleared for upcoming hernia surgery ASA 2.  He has no history of anesthesia problems or clotting problems in the past.  EKG is pending.  Recent labs normal

## 2022-02-15 NOTE — NURSING NOTE
Comprehensive Intake Entered On:  12/6/2019 2:22 PM CST    Performed On:  12/6/2019 2:18 PM CST by Candida Everett               Summary   Chief Complaint :   HTN med check   Weight Measured :   189.8 lb(Converted to: 189 lb 13 oz, 86.09 kg)    Height Measured :   75 in(Converted to: 6 ft 3 in, 190.50 cm)    Body Mass Index :   23.72 kg/m2   Body Surface Area :   2.13 m2   Systolic Blood Pressure :   148 mmHg (HI)    Diastolic Blood Pressure :   73 mmHg   Mean Arterial Pressure :   98 mmHg   Peripheral Pulse Rate :   65 bpm   BP Site :   Right arm   Pulse Site :   Brachial artery   BP Method :   Electronic   HR Method :   Electronic   Temperature Tympanic :   97.5 DegF(Converted to: 36.4 DegC)  (LOW)    Candida Everett - 12/6/2019 2:18 PM CST   Health Status   Allergies Verified? :   Yes   Medication History Verified? :   Yes   Medical History Verified? :   Yes   Pre-Visit Planning Status :   Completed   Tobacco Use? :   Never smoker   Candida Everett - 12/6/2019 2:18 PM CST   Consents   Consent for Immunization Exchange :   Consent Granted   Consent for Immunizations to Providers :   Consent Granted   Candida Everett - 12/6/2019 2:18 PM CST   Meds / Allergies   (As Of: 12/6/2019 2:22:52 PM CST)   Allergies (Active)   No known allergies  Estimated Onset Date:   Unspecified ; Created By:   Lucia Middleton; Reaction Status:   Active ; Category:   Drug ; Substance:   No known allergies ; Type:   Allergy ; Updated By:   Lucia Middleton; Source:   Paper Chart ; Reviewed Date:   12/6/2019 2:21 PM CST        Medication List   (As Of: 12/6/2019 2:22:52 PM CST)   Prescription/Discharge Order    lisinopril  :   lisinopril ; Status:   Prescribed ; Ordered As Mnemonic:   lisinopril 5 mg oral tablet ; Simple Display Line:   5 mg, 1 tab(s), po, bid, 180 tab(s), 1 Refill(s) ; Ordering Provider:   Demond Tuttle MD; Catalog Code:   lisinopril ; Order Dt/Tm:   5/24/2019 4:15:05 PM CDT            Home Meds     calcium citrate  :   calcium citrate ; Status:   Documented ; Ordered As Mnemonic:   calcium (as calcium citrate) 250 mg oral tablet ; Simple Display Line:   250 mg, 1 tab(s), Oral, daily, 0 Refill(s) ; Catalog Code:   calcium citrate ; Order Dt/Tm:   12/6/2019 2:22:31 PM CST          bimatoprost ophthalmic  :   bimatoprost ophthalmic ; Status:   Completed ; Ordered As Mnemonic:   Lumigan 0.01% ophthalmic solution ; Simple Display Line:   1 drop(s), left eye, qpm ; Catalog Code:   bimatoprost ophthalmic ; Order Dt/Tm:   6/9/2015 5:19:12 PM CDT          amoxicillin  :   amoxicillin ; Status:   Completed ; Ordered As Mnemonic:   amoxicillin 500 mg oral tablet ; Simple Display Line:   See Instructions, 4 tabs 1 hr prior to dental work. ; Catalog Code:   amoxicillin ; Order Dt/Tm:   6/12/2012 4:28:19 PM CDT          ergocalciferol  :   ergocalciferol ; Status:   Documented ; Ordered As Mnemonic:   Vitamin D ; Simple Display Line:   1,000 unit(s), PO ; Catalog Code:   ergocalciferol ; Order Dt/Tm:   2/9/2010 6:07:01 PM CST          lutein  :   lutein ; Status:   Documented ; Ordered As Mnemonic:   Lutein ; Simple Display Line:   PO, Daily ; Catalog Code:   lutein ; Order Dt/Tm:   2/9/2010 6:07:42 PM CST          flax  :   flax ; Status:   Documented ; Ordered As Mnemonic:   Flax Seed Oil ; Catalog Code:   flax ; Order Dt/Tm:   2/9/2010 6:06:30 PM CST          omega-3 polyunsaturated fatty acids  :   omega-3 polyunsaturated fatty acids ; Status:   Documented ; Ordered As Mnemonic:   Fish Oil oral capsule ; Catalog Code:   omega-3 polyunsaturated fatty acids ; Order Dt/Tm:   2/9/2010 6:06:06 PM CST          multivitamin  :   multivitamin ; Status:   Documented ; Ordered As Mnemonic:   Daily Multiple Vitamins ; Simple Display Line:   PO, Daily ; Catalog Code:   multivitamin ; Order Dt/Tm:   1/28/2010 1:48:22 PM CST

## 2022-02-15 NOTE — NURSING NOTE
Comprehensive Intake Entered On:  5/24/2019 3:59 PM CDT    Performed On:  5/24/2019 3:56 PM CDT by Kimmy Drummond               Summary   Chief Complaint :   Preop.  Left eye surgery. Dr. Watts.  [facility]. 5/29/19   Weight Measured :   194.2 lb(Converted to: 194 lb 3 oz, 88.09 kg)    Height Measured :   75 in(Converted to: 6 ft 3 in, 190.50 cm)    Body Mass Index :   24.27 kg/m2   Body Surface Area :   2.16 m2   Systolic Blood Pressure :   128 mmHg   Diastolic Blood Pressure :   75 mmHg   Mean Arterial Pressure :   93 mmHg   Peripheral Pulse Rate :   56 bpm (LOW)    BP Method :   Electronic   HR Method :   Electronic   Temperature Tympanic :   97.3 DegF(Converted to: 36.3 DegC)  (LOW)    Kimmy Drummond - 5/24/2019 3:56 PM CDT   Health Status   Allergies Verified? :   Yes   Medication History Verified? :   Yes   Tobacco Use? :   Never smoker   Kimmy Drummond - 5/24/2019 3:56 PM CDT   Meds / Allergies   (As Of: 5/24/2019 3:59:01 PM CDT)   Allergies (Active)   No known allergies  Estimated Onset Date:   Unspecified ; Created By:   Lucia Robertson CMA; Reaction Status:   Active ; Category:   Drug ; Substance:   No known allergies ; Type:   Allergy ; Updated By:   Lucia Robertson CMA; Source:   Paper Chart ; Reviewed Date:   7/10/2018 5:54 PM CDT        Medication List   (As Of: 5/24/2019 3:59:01 PM CDT)   Prescription/Discharge Order    lisinopril  :   lisinopril ; Status:   Prescribed ; Ordered As Mnemonic:   lisinopril 5 mg oral tablet ; Simple Display Line:   5 mg, 1 tab(s), po, bid, 180 tab(s), 3 Refill(s) ; Ordering Provider:   Demond Tuttle MD; Catalog Code:   lisinopril ; Order Dt/Tm:   7/10/2018 5:42:51 PM            Home Meds    bimatoprost ophthalmic  :   bimatoprost ophthalmic ; Status:   Documented ; Ordered As Mnemonic:   Lumigan 0.01% ophthalmic solution ; Simple Display Line:   1 drop(s), left eye, qpm ; Catalog Code:   bimatoprost ophthalmic ; Order Dt/Tm:    6/9/2015 5:19:12 PM          amoxicillin  :   amoxicillin ; Status:   Documented ; Ordered As Mnemonic:   amoxicillin 500 mg oral tablet ; Simple Display Line:   See Instructions, 4 tabs 1 hr prior to dental work. ; Catalog Code:   amoxicillin ; Order Dt/Tm:   6/12/2012 4:28:19 PM          ergocalciferol  :   ergocalciferol ; Status:   Documented ; Ordered As Mnemonic:   Vitamin D ; Simple Display Line:   1,000 unit(s), PO ; Catalog Code:   ergocalciferol ; Order Dt/Tm:   2/9/2010 6:07:01 PM          lutein  :   lutein ; Status:   Documented ; Ordered As Mnemonic:   Lutein ; Simple Display Line:   PO, Daily ; Catalog Code:   lutein ; Order Dt/Tm:   2/9/2010 6:07:42 PM          flax  :   flax ; Status:   Documented ; Ordered As Mnemonic:   Flax Seed Oil ; Catalog Code:   flax ; Order Dt/Tm:   2/9/2010 6:06:30 PM          omega-3 polyunsaturated fatty acids  :   omega-3 polyunsaturated fatty acids ; Status:   Documented ; Ordered As Mnemonic:   Fish Oil oral capsule ; Catalog Code:   omega-3 polyunsaturated fatty acids ; Order Dt/Tm:   2/9/2010 6:06:06 PM          multivitamin  :   multivitamin ; Status:   Documented ; Ordered As Mnemonic:   Daily Multiple Vitamins ; Simple Display Line:   PO, Daily ; Catalog Code:   multivitamin ; Order Dt/Tm:   1/28/2010 1:48:22 PM

## 2022-02-15 NOTE — PROGRESS NOTES
Patient:   SVETLANA MARCOS            MRN: [id number]            FIN: [id number]               Age:   66 years     Sex:  Male     :  1955   Associated Diagnoses:   Glaucoma   Author:   Demond Tuttle MD      Preoperative Information   Indication for surgery:  Glaucoma.    Accompanied by:  No one.    Source of history:  Self.    History limitation:  None.       Chief Complaint   10/26/2021 10:34 AM CDT  DOS 21 at [facility] in [place] with Dr. Chelsea Marina for L eye tube shunt. Fax to [phone].        Review of Systems   Constitutional:  Negative.    Eye:  Negative, glaucoma.    Ear/Nose/Mouth/Throat:  Negative.    Respiratory:  Negative.    Cardiovascular:  Negative.    Gastrointestinal:  Negative.    Genitourinary:  Negative.    Hematology/Lymphatics:  Negative.    Endocrine:  Negative.    Immunologic:  Negative.    Musculoskeletal:  Negative.    Integumentary:  Negative.    Neurologic:  Negative.       Health Status   Allergies:    Allergic Reactions (Selected)  No Known Medication Allergies   Medications:  (Selected)   Prescriptions  Prescribed  lisinopril 5 mg oral tablet: = 1 tab(s), Oral, daily, # 90 tab(s), 1 Refill(s), Type: Maintenance, Pharmacy: [facility][id number], 1 tab(s) Oral daily, 75, in, 21 14:55:00 CDT, Height Measured, 192, lb, 21 14:46:00 CDT, Weight Measured  Documented Medications  Documented  Daily Multiple Vitamins: PO, Daily, 0  Fish Oil oral capsule: 0  Flax Seed Oil: 0  Lutein: PO, Daily, 0  Rocklatan 0.02%-0.005% ophthalmic solution: 1 drop(s), Eye-Left, qpm, 0 Refill(s), Type: Maintenance  Simbrinza 1%- 0.2% ophthalmic suspension: 1 drop(s), Eye-Left, bid  Vitamin D: ( 1,000 unit(s) ), PO, 0  calcium (as calcium citrate) 250 mg oral tablet: = 1 tab(s) ( 250 mg ), Oral, daily, 0 Refill(s), Type: Maintenance,    Medications          *denotes recorded medication          *Simbrinza 1%- 0.2% ophthalmic suspension: 1 drop(s), Eye-Left, bid.           *calcium (as calcium citrate) 250 mg oral tablet: 250 mg, 1 tab(s), Oral, daily, 0 Refill(s).          *Vitamin D: 1,000 unit(s), PO.          *Flax Seed Oil: 0.          *Rocklatan 0.02%-0.005% ophthalmic solution: 1 drop(s), Eye-Left, qpm, 0 Refill(s).          lisinopril 5 mg oral tablet: 1 tab(s), Oral, daily, 90 tab(s), 1 Refill(s).          *Lutein: PO, Daily.          *Daily Multiple Vitamins: PO, Daily.          *Fish Oil oral capsule: 0.       Problem list:    All Problems  Elevated lipids / SNOMED CT 198996568 / Confirmed  Statin declined / SNOMED CT 427744589 / Confirmed  Benign essential HTN / SNOMED CT 4484448 / Confirmed  DJD (Degenerative Joint Disease) of Lumbar Spine / ICD-9-.3 / Confirmed  Chronic Low Back Pain / ICD-9-.2 / Confirmed      Histories   Past Medical History:    Active  Benign essential HTN (9606931): Onset on 2011 at 55 years.  Chronic Low Back Pain (724.2): Onset on 2010 at 55 years.  DJD (Degenerative Joint Disease) of Lumbar Spine (721.3)   Family History:    HEADACHE  Mother  Sister  CVA - Cerebrovascular accident  Mother  PVD - Peripheral vascular disease  Father ()     Procedure history:    Repair of inguinal hernia - Bilateral (SNOMED CT 60732395) on 2020 at 64 Years.  Colonoscopy (SNOMED CT 615000451) on 2012 at 56 Years.  Comments:  7/10/2012 2:06 PM CDT - Daphne Lino MA  Normal colonoscopy repeat in 10 years  Colonoscopy (SNOMED CT 855401553) on 2004 at 48 Years.  Comments:  2010 11:08 AM L[facility] - Lucia Middleton  rev. 2009  Total hip replacement (SNOMED CT 334474878) in  at 40 Years.  Comments:  2010 11:07 AM SHELLEY - Lucia Middleton  right hip - redue  Total hip replacement (SNOMED CT 748754095) in  at 32 Years.  Comments:  2010 11:07 AM L[facility] - Lucia Middleton  right hip   Social History:        Electronic Cigarette/Vaping Assessment            Electronic Cigarette Use: Never.      Alcohol  Assessment: Current      Tobacco Assessment            Never (less than 100 in lifetime)      Exercise and Physical Activity Assessment            Exercise frequency: Active.       Has  no history of anemia.  Has no personal history of bleeding problems.   Has no personal or family history of anesthesia reactions.  S/he has not taken aspirin or aspirin containing products in the last week.     S/he  has not been on corticosteroids for more than 2 weeks recently.    Chest pain / SOB walking up 2 flights of steps?no  CAD MI?no    Asthma  or Bronchitis? no  Diabetes? no       Insulin/Orals?          Physical Examination   Vital Signs   10/26/2021 10:34 AM CDT Peripheral Pulse Rate 54 bpm  LOW    Pulse Site Radial artery    HR Method Electronic    Systolic Blood Pressure 138 mmHg  HI    Diastolic Blood Pressure 81 mmHg  HI    Mean Arterial Pressure 100 mmHg    BP Site Right arm    BP Method Electronic      Measurements from flowsheet : Measurements   10/26/2021 10:34 AM CDT Height Measured - Standard 75 in    Weight Measured - Standard 191 lb    BSA 2.14 m2    Body Mass Index 23.87 kg/m2      General:  Alert and oriented, No acute distress.    Eye:  Pupils are equal, round and reactive to light, Extraocular movements are intact.    HENT:  Normocephalic, Oral mucosa is moist.    Neck:  Supple, Non-tender, No lymphadenopathy, No thyromegaly.    Respiratory:  Lungs are clear to auscultation, Respirations are non-labored, Breath sounds are equal.    Cardiovascular:  Normal rate, Regular rhythm, No murmur, Good pulses equal in all extremities, No edema.    Gastrointestinal:  Soft.    Musculoskeletal:  No tenderness, No swelling, No deformity.    Integumentary:  Warm, Dry.    Neurologic:  Alert, Oriented, No focal deficits, Cranial Nerves II-XII are grossly intact.    Psychiatric:  Cooperative, Appropriate mood & affect, Normal judgment.       Review / Management            Impression and Plan   Diagnosis     Glaucoma  (RWO69-TM H40.9).     Condition:  ok for surgery asa2.

## 2022-02-15 NOTE — NURSING NOTE
Comprehensive Intake Entered On:  1/11/2019 4:27 PM CST    Performed On:  1/11/2019 4:24 PM CST by Kimmy Drummond               Summary   Chief Complaint :   Preop.  [facility]. Left eye Cataract 1/23/19.  Dr. Howard.     Weight Measured :   190.8 lb(Converted to: 190 lb 13 oz, 86.55 kg)    Systolic Blood Pressure :   127 mmHg   Diastolic Blood Pressure :   75 mmHg   Mean Arterial Pressure :   92 mmHg   Peripheral Pulse Rate :   61 bpm   BP Method :   Electronic   HR Method :   Electronic   Temperature Tympanic :   97.1 DegF(Converted to: 36.2 DegC)  (LOW)    Kimmy Drummond - 1/11/2019 4:24 PM CST   Health Status   Allergies Verified? :   Yes   Medication History Verified? :   Yes   Pre-Visit Planning Status :   Completed   Tobacco Use? :   Never smoker   Kimmy Drummond - 1/11/2019 4:24 PM CST

## 2022-02-15 NOTE — LETTER
(Inserted Image. Unable to display)   July 12, 2021    SVETLANA MARCOS  [address] [place] [address]            Dear SVETLANA,      Thank you for selecting [facility] for your healthcare needs.    Our records indicate you are due for the following services:     Hypertension check ~ please remember to bring your at-home blood pressure readings with you to your appointment.     (FYI   Regarding office visits: In some instances, a video visit or telephone visit may be offered as an option.)      To schedule an appointment or if you have further questions, please contact your clinic at [phone].      Powered by [employer]    Sincerely,    Demond Tuttle MD

## 2022-02-15 NOTE — TELEPHONE ENCOUNTER
---------------------  From: Ashli Elkins LPN (Phone Messages Pool ([address] [place] [address]))   To: TFS Message Pool ([address] [place] [address]);     Sent: 6/26/2020 11:45:21 AM CDT  Subject: prednisone/referral     Phone Message    PCP:   MICHELE      Time of Call:  11:14       Person Calling:  pt  Phone number:  [phone]    Note:   Pt LM stating he seen TFS this week and forgot to ask for a refill on  his prednisone 10mg. Pt would like it sent to [facility].    Pt also says him and TFS talked about his referral- pt says he has not heard from anyone to schedule. Per Referral note referral was going to be entered into [facility] for [facility] to contact to schedule.    Last office visit and reason:  6/24/20 HTN---------------------  From: Jesika Hale CMA (TFS Message Pool ([address] [place] [address]))   To: Referral Coordinators Pool ([address] [place] [address]);     Cc: Demond Tuttle MD;      Sent: 6/26/2020 2:55:19 PM CDT  Subject: FW: prednisone/referral---------------------  From: Kathy Montiel (Referral Coordinators Pool ([address] [place] [address]))   To: TFS Message Pool ([address] [place] [address]);     Sent: 6/26/2020 3:25:17 PM CDT  Subject: RE: prednisone/referral     Liliana in [place] entered today in [facility], does take a few days.Noted.---------------------  From: Demond Tuttle MD   To: TFS Message Pool ([address] [place] [address]);     Sent: 6/26/2020 3:45:29 PM CDT  Subject: RE: prednisone/referral     ok to ref prednisone

## 2022-02-15 NOTE — TELEPHONE ENCOUNTER
---------------------  From: Jesika Hale CMA (Phone Messages Pool ([id number]))   Sent: 10/5/2020 11:18:01 AM CDT  Subject: Phone Message     Phone Message    PCP:   MICHELE      Time of Call:  1042       Person Calling:  Pt  Phone number:  [phone], LDM    Returned call at: 0224    Note:   Calls to see if TFS recommends he get a shingles vaccine. Per HPILLY Jin yes he should get the shingles vaccine since hes over 50 years old since he has medicare insurance should be done at the pharmacy. Notified pt.    Last office visit and reason:  06/24/20 HTN TFSPt LM at 12:19pm and wants to update us and let us know he had this done today at [facility]

## 2022-02-15 NOTE — TELEPHONE ENCOUNTER
Entered by Lashell Perez CMA on December 29, 2020 4:17:35 AM CST  ---------------------  From: Lashell Perez CMA   To: [facility] #[id number]    Sent: 12/29/2020 4:17:35 AM CST  Subject: Medication Management     ** Submitted: **  Order:lisinopril (lisinopril 5 mg oral tablet)  1 tab(s)  Oral  daily  Qty:  30 tab(s)        Refills:  0          Substitutions Allowed     Route To [facility] #[id number]    Signed by Lashell Perez CMA  12/29/2020 10:16:00 AM [facility]    ** Submitted: **  Complete:lisinopril (lisinopril 5 mg oral tablet)   Signed by Lashell Perez CMA  12/29/2020 10:17:00 AM [facility]    ** Not Approved:  **  lisinopril (LISINOPRIL 5MG TABLETS)  TAKE 1 TABLET BY MOUTH DAILY  Qty:  90 tab(s)        Days Supply:  90        Refills:  0          Substitutions Allowed     Route To [facility] - [facility] #[id number]   Note from Pharmacy:  **Patient requests 90 days supply**  Signed by Lashell Perez CMA            ------------------------------------------  From: [facility] #[id number]  To: Demond Tuttle MD  Sent: December 28, 2020 3:26:06 PM CST  Subject: Medication Management  Due: December 24, 2020 1:58:33 PM CST     ** On Hold Pending Signature **     Dispensed Drug: lisinopril (lisinopril 5 mg oral tablet), TAKE 1 TABLET BY MOUTH DAILY  Quantity: 90 tab(s)  Days Supply: 90  Refills: 0  Substitutions Allowed  Notes from Pharmacy: **Patient requests 90 days supply**  ------------------------------------------

## 2022-02-15 NOTE — NURSING NOTE
Comprehensive Intake Entered On:  8/3/2021 2:50 PM CDT    Performed On:  8/3/2021 2:46 PM CDT by Galina Lawton LPN               Summary   Chief Complaint :   HTN check, medication refill    Weight Measured :   192 lb(Converted to: 192 lb 0 oz, 87.090 kg)    Height Measured :   75 in(Converted to: 6 ft 3 in, 190.50 cm)    Body Mass Index :   24 kg/m2   Body Surface Area :   2.14 m2   Systolic Blood Pressure :   152 mmHg (HI)    Diastolic Blood Pressure :   84 mmHg (HI)    Mean Arterial Pressure :   107 mmHg   Peripheral Pulse Rate :   61 bpm   BP Site :   Right arm   Pulse Site :   Brachial artery   BP Method :   Electronic   HR Method :   Electronic   Galina Lawton LPN - 8/3/2021 2:46 PM CDT   Health Status   Allergies Verified? :   Yes   Medication History Verified? :   Yes   Pre-Visit Planning Status :   Completed   Tobacco Use? :   Never smoker   Galina Lawton LPN - 8/3/2021 2:46 PM CDT   Consents   Consent for Immunization Exchange :   Consent Granted   Consent for Immunizations to Providers :   Consent Granted   Galina Lawton LPN - 8/3/2021 2:46 PM CDT   Meds / Allergies   (As Of: 8/3/2021 2:50:22 PM CDT)   Allergies (Active)   No Known Medication Allergies  Estimated Onset Date:   Unspecified ; Created By:   Jesika Hale CMA; Reaction Status:   Active ; Category:   Drug ; Substance:   No Known Medication Allergies ; Type:   Allergy ; Updated By:   Jesika Hale CMA; Reviewed Date:   4/27/2021 11:02 AM CDT        Medication List   (As Of: 8/3/2021 2:50:22 PM CDT)   Prescription/Discharge Order    lisinopril  :   lisinopril ; Status:   Prescribed ; Ordered As Mnemonic:   lisinopril 5 mg oral tablet ; Simple Display Line:   1 tab(s), Oral, daily, 90 tab(s), 1 Refill(s) ; Ordering Provider:   Demond Tuttle MD; Catalog Code:   lisinopril ; Order Dt/Tm:   1/8/2021 11:19:14 AM [facility]            Home Meds    brimonidine-brinzolamide ophthalmic  :   brimonidine-brinzolamide ophthalmic ;  Status:   Documented ; Ordered As Mnemonic:   Simbrinza 1%- 0.2% ophthalmic suspension ; Simple Display Line:   1 drop(s), Eye-Left, bid ; Catalog Code:   brimonidine-brinzolamide ophthalmic ; Order Dt/Tm:   4/27/2021 10:48:38 AM CDT          calcium citrate  :   calcium citrate ; Status:   Documented ; Ordered As Mnemonic:   calcium (as calcium citrate) 250 mg oral tablet ; Simple Display Line:   250 mg, 1 tab(s), Oral, daily, 0 Refill(s) ; Catalog Code:   calcium citrate ; Order Dt/Tm:   12/6/2019 2:22:31 PM CST          ergocalciferol  :   ergocalciferol ; Status:   Documented ; Ordered As Mnemonic:   Vitamin D ; Simple Display Line:   1,000 unit(s), PO ; Catalog Code:   ergocalciferol ; Order Dt/Tm:   2/9/2010 6:07:01 PM CST          flax  :   flax ; Status:   Documented ; Ordered As Mnemonic:   Flax Seed Oil ; Catalog Code:   flax ; Order Dt/Tm:   2/9/2010 6:06:30 PM CST          latanoprost-netarsudil ophthalmic  :   latanoprost-netarsudil ophthalmic ; Status:   Documented ; Ordered As Mnemonic:   Rocklatan 0.02%-0.005% ophthalmic solution ; Simple Display Line:   1 drop(s), Eye-Left, qpm, 0 Refill(s) ; Catalog Code:   latanoprost-netarsudil ophthalmic ; Order Dt/Tm:   4/27/2021 10:48:38 AM CDT          lutein  :   lutein ; Status:   Documented ; Ordered As Mnemonic:   Lutein ; Simple Display Line:   PO, Daily ; Catalog Code:   lutein ; Order Dt/Tm:   2/9/2010 6:07:42 PM CST          multivitamin  :   multivitamin ; Status:   Documented ; Ordered As Mnemonic:   Daily Multiple Vitamins ; Simple Display Line:   PO, Daily ; Catalog Code:   multivitamin ; Order Dt/Tm:   1/28/2010 1:48:22 PM CST          omega-3 polyunsaturated fatty acids  :   omega-3 polyunsaturated fatty acids ; Status:   Documented ; Ordered As Mnemonic:   Fish Oil oral capsule ; Catalog Code:   omega-3 polyunsaturated fatty acids ; Order Dt/Tm:   2/9/2010 6:06:06 PM CST

## 2022-02-15 NOTE — NURSING NOTE
Comprehensive Intake Entered On:  10/26/2021 10:40 AM CDT    Performed On:  10/26/2021 10:34 AM CDT by Lilian Hinson CMA               Summary   Chief Complaint :   DOS 21 at [facility] in [place] with Dr. Chelsea Marina for L eye tube shunt. Fax to [phone].   Weight Measured :   191 lb(Converted to: 191 lb 0 oz, 86.636 kg)    Height Measured :   75 in(Converted to: 6 ft 3 in, 190.50 cm)    Body Mass Index :   23.87 kg/m2   Body Surface Area :   2.14 m2   Systolic Blood Pressure :   138 mmHg (HI)    Diastolic Blood Pressure :   81 mmHg (HI)    Mean Arterial Pressure :   100 mmHg   Peripheral Pulse Rate :   54 bpm (LOW)    BP Site :   Right arm   Pulse Site :   Radial artery   BP Method :   Electronic   HR Method :   Electronic   Lilian Hinson CMA - 10/26/2021 10:34 AM CDT   Health Status   Allergies Verified? :   Yes   Medication History Verified? :   Yes   Pre-Visit Planning Status :   Completed   Lilian Hinson CMA - 10/26/2021 10:34 AM CDT   Demographics   Last Name :   Rene   Address :   [address]    First Name :   Kar Gale Initial :   JOLLY   Responsible Party Date of Birth () :   1955 CDT   City :   [place]   State :   [place]   Zip Code :   [address]   Contact Relationship to Patient (other) :   Patient   Joya Lilian WHITTAKER - 10/26/2021 10:34 AM CDT   Consents   Consent for Immunization Exchange :   Consent Granted   Consent for Immunizations to Providers :   Consent Granted   Lilian Hinson CMA - 10/26/2021 10:34 AM CDT   Meds / Allergies   (As Of: 10/26/2021 10:40:29 AM CDT)   Allergies (Active)   No Known Medication Allergies  Estimated Onset Date:   Unspecified ; Created By:   Jesika Hale CMA; Reaction Status:   Active ; Category:   Drug ; Substance:   No Known Medication Allergies ; Type:   Allergy ; Updated By:   Jesika Hale CMA; Reviewed Date:   8/3/2021 3:13 PM CDT        Medication List   (As Of: 10/26/2021 10:40:29 AM CDT)   Prescription/Discharge Order     lisinopril  :   lisinopril ; Status:   Prescribed ; Ordered As Mnemonic:   lisinopril 5 mg oral tablet ; Simple Display Line:   1 tab(s), Oral, daily, 90 tab(s), 1 Refill(s) ; Ordering Provider:   Demond Tuttle MD; Catalog Code:   lisinopril ; Order Dt/Tm:   8/3/2021 5:03:02 PM CDT            Home Meds    brimonidine-brinzolamide ophthalmic  :   brimonidine-brinzolamide ophthalmic ; Status:   Documented ; Ordered As Mnemonic:   Simbrinza 1%- 0.2% ophthalmic suspension ; Simple Display Line:   1 drop(s), Eye-Left, bid ; Catalog Code:   brimonidine-brinzolamide ophthalmic ; Order Dt/Tm:   4/27/2021 10:48:38 AM CDT          calcium citrate  :   calcium citrate ; Status:   Documented ; Ordered As Mnemonic:   calcium (as calcium citrate) 250 mg oral tablet ; Simple Display Line:   250 mg, 1 tab(s), Oral, daily, 0 Refill(s) ; Catalog Code:   calcium citrate ; Order Dt/Tm:   12/6/2019 2:22:31 PM CST          ergocalciferol  :   ergocalciferol ; Status:   Documented ; Ordered As Mnemonic:   Vitamin D ; Simple Display Line:   1,000 unit(s), PO ; Catalog Code:   ergocalciferol ; Order Dt/Tm:   2/9/2010 6:07:01 PM CST          flax  :   flax ; Status:   Documented ; Ordered As Mnemonic:   Flax Seed Oil ; Catalog Code:   flax ; Order Dt/Tm:   2/9/2010 6:06:30 PM CST          latanoprost-netarsudil ophthalmic  :   latanoprost-netarsudil ophthalmic ; Status:   Documented ; Ordered As Mnemonic:   Rocklatan 0.02%-0.005% ophthalmic solution ; Simple Display Line:   1 drop(s), Eye-Left, qpm, 0 Refill(s) ; Catalog Code:   latanoprost-netarsudil ophthalmic ; Order Dt/Tm:   4/27/2021 10:48:38 AM CDT          lutein  :   lutein ; Status:   Documented ; Ordered As Mnemonic:   Lutein ; Simple Display Line:   PO, Daily ; Catalog Code:   lutein ; Order Dt/Tm:   2/9/2010 6:07:42 PM CST          multivitamin  :   multivitamin ; Status:   Documented ; Ordered As Mnemonic:   Daily Multiple Vitamins ; Simple Display Line:   PO, Daily ;  Catalog Code:   multivitamin ; Order Dt/Tm:   1/28/2010 1:48:22 PM CST          omega-3 polyunsaturated fatty acids  :   omega-3 polyunsaturated fatty acids ; Status:   Documented ; Ordered As Mnemonic:   Fish Oil oral capsule ; Catalog Code:   omega-3 polyunsaturated fatty acids ; Order Dt/Tm:   2/9/2010 6:06:06 PM CST

## 2022-02-15 NOTE — NURSING NOTE
Pt called to say that his 1st procedure did not need a covid test but his 2nd procedure is requiring a covid test... call transferred to set up [facility] appt and instructed pt to have the facility fax us an order for the covid test being that his PCP-TFS is out of clinic today

## 2022-02-15 NOTE — TELEPHONE ENCOUNTER
---------------------  From: Kimmy Drummond (TFS Message Pool ([id number]))   To: Appointment Pool ([address] [place] [address]);     Sent: 6/17/2020 11:37:06 AM CDT  Subject: General Message     Please call patient to schedule face to face visit with TFS.  (labs to be done same day, no need for separate lab visit)     Due for: BMP and Lipid      ---------------------  From: Ameena Kwon (Recall  Pool ([id number]))   To: [facility] Message Pool ([facility]);     Sent: 6/9/2020 8:17:13 AM CDT ! Show up: 6/9/2020 8:17:00 AM CDT        Addendum by Ameena Kwon on June 09, 2020 8:17:06 AM CDT  Forwarded to [facility] Pool per protocol.         ---------------------  From: Zaina Schilling   To: Recall  Pool ([id number]);     Sent: 12/9/2019 1:18:53 PM CST Show up: 6/9/2020 6:00:00 AM CDT  Subject: CHRONIC DISEASE VISIT    Due Date/Time: 6/9/2020 6:00:00 AM CDT     Return to Clinic for the following per TFS                                      Reason for visit: HTN Check                                                          Due: six months                           Additional Comments: Check to see if any labs are due at this time.scheduled

## 2022-02-15 NOTE — LETTER
(Inserted Image. Unable to display)     November 29, 2019      SVETLANA MARCOS  [address]   [place], [place] [id number]          Dear SVETLANA,      Thank you for selecting [facility] (previously [facility] & [facility]) for your healthcare needs.      Our records indicate you are due for the following services:     Hypertension check ~ please remember to bring your at-home blood pressure readings with you to your appointment.       To schedule an appointment or if you have further questions, please contact your primary clinic:   [facility]       [phone]   [facility]       [phone]              [facility]     [phone]      Powered by [employer]    Sincerely,    Demond Tuttle MD

## 2022-02-15 NOTE — TELEPHONE ENCOUNTER
---------------------  From: Brigette Mcintyre RN (Phone Messages Pool ([address] [place] [address]))   To: TFS Message Pool ([address] [place] [address]);     Sent: 7/8/2020 5:30:08 PM CDT  Subject: Phone Message     Phone Message    PCP:   MICHELE      Time of Call:  1712       Person Calling:  pt  Phone number:  [phone]    Returned call at: _    Note:   Pt called stating he has surgery scheduled w/Dr. Zimmerman. They are needing pre-op physical and he is wondering if the visit from 6-24-20 can be updated and used for this. He states they are also wanting an EKG done w/pre-op. OK for orders for nurse only EKG visit? Please advise.    Fax for Surgical site is [phone]    Last office visit and reason:  6-24-20 HTN w/TFS---------------------  From: Kimmy Drummond (TFS Message Pool ([address] [place] [address]))   To: Demond Tuttle MD;     Sent: 7/9/2020 8:26:24 AM CDT  Subject: FW: Phone Message     Spoke with patient.  Surgery is scheduled for 7/17.  Wondering if his 6/24 chronic disease visit can be updated instead of coming in for additional preop.  Also, will he need another BMP?     If okay, he will schedule a nurse only EKG.---------------------  From: Demond Tuttle MD   To: Topmission Message Pool ([facility]24[facility]);     Sent: 7/9/2020 8:50:36 AM CDT  Subject: RE: Phone Message     no labs EKG only I can update note for surgeryPatient notified, transferred to scheduling for nurse only appointment.  Preop form sent to [place]---------------------  From: Demond Tuttle MD   To: Topmission Message Pool (48624[facility]);     Sent: 7/9/2020 11:29:43 AM CDT  Subject: RE: Phone Message     done

## 2022-02-15 NOTE — PROGRESS NOTES
Patient:   SVETLANA MARCOS            MRN: [id number]            FIN: [id number]               Age:   65 years     Sex:  Male     :  1955   Associated Diagnoses:   Chronic Low Back Pain; HTN; Statin declined   Author:   Demond Tuttle MD      Visit Information      Date of Service: 2021 02:34 pm  Performing Location: [facility]  Encounter#: [id number]      Primary Care Provider (PCP):  Demond Tuttle MD    NPI# [id number]      Referring Provider:  Demond Tuttle MD# [id number]      Chief Complaint   8/3/2021 2:46 PM CDT     HTN check, medication refill        History of Present Illness   Patient is here for 6-month follow-up.  Blood pressures have been very good at home.  He struggling with his severe glaucoma he is already had a stent put in the looking at his shunt.  He continues to mckoy his chronic low back pain and bilateral shoulder pain.           Review of Systems   Constitutional:  No weakness, No fatigue, No decreased activity, No weight gain.    Eye:  Negative except as documented in history of present illness.    Ear/Nose/Mouth/Throat:  Negative.    Respiratory:  No shortness of breath, No cough.    Cardiovascular:  No chest pain, No peripheral edema.    Gastrointestinal:  No nausea, No vomiting, No diarrhea, No constipation, No abdominal pain.    Genitourinary:  No dysuria, No hematuria, No change in urine stream.    Hematology/Lymphatics:  Negative.    Endocrine:  Negative.    Immunologic:  Negative.    Musculoskeletal:  Negative except as documented in history of present illness.    Integumentary:  No rash.    Neurologic:  Alert and oriented X4.    Psychiatric:  Negative.              Health Status   Allergies:    Allergic Reactions (Selected)  No Known Medication Allergies   Medications:  (Selected)   Prescriptions  Prescribed  lisinopril 5 mg oral tablet: = 1 tab(s), Oral, daily, # 90 tab(s), 1 Refill(s), Type: Maintenance, Pharmacy: [facility]  #[id number], 1 tab(s) Oral daily, 75, in, 20 10:42:00 CDT, Height Measured, 190, lb, 21 10:46:00 CST, Weight Measured  Documented Medications  Documented  Daily Multiple Vitamins: PO, Daily, 0  Fish Oil oral capsule: 0  Flax Seed Oil: 0  Lutein: PO, Daily, 0  Rocklatan 0.02%-0.005% ophthalmic solution: 1 drop(s), Eye-Left, qpm, 0 Refill(s), Type: Maintenance  Simbrinza 1%- 0.2% ophthalmic suspension: 1 drop(s), Eye-Left, bid  Vitamin D: ( 1,000 unit(s) ), PO, 0  calcium (as calcium citrate) 250 mg oral tablet: = 1 tab(s) ( 250 mg ), Oral, daily, 0 Refill(s), Type: Maintenance   Problem list:    All Problems  Chronic Low Back Pain / ICD-9-.2 / Confirmed  DJD (Degenerative Joint Disease) of Lumbar Spine / ICD-9-.3 / Confirmed  Benign essential HTN / SNOMED CT 3401264 / Confirmed  Statin declined / SNOMED CT 719572347 / Confirmed  Elevated lipids / SNOMED CT 232066147 / Confirmed      Histories   Past Medical History:    Active  Benign essential HTN (0854360): Onset on 2011 at 55 years.  Chronic Low Back Pain (724.2): Onset on 2010 at 55 years.  DJD (Degenerative Joint Disease) of Lumbar Spine (721.3)   Family History:    HEADACHE  Mother  Sister  CVA - Cerebrovascular accident  Mother  PVD - Peripheral vascular disease  Father ()     Procedure history:    Repair of inguinal hernia - Bilateral (SNOMED CT 63185764) on 2020 at 64 Years.  Colonoscopy (SNOMED CT 218227727) on 2012 at 56 Years.  Comments:  7/10/2012 2:06 PM CDT - Daphne Lino MA  Normal colonoscopy repeat in 10 years  Colonoscopy (SNOMED CT 186987468) on 2004 at 48 Years.  Comments:  2010 11:08 AM CDT - Lucai Middleton 2009  Total hip replacement (SNOMED CT 202206277) in  at 40 Years.  Comments:  2010 11:07 AM Lucia Kim  right hip - redue  Total hip replacement (SNOMED CT 419329247) in 1987 at 32 Years.  Comments:  2010 11:07 AM SHELLEY Middleton  Lucia  right hip   Social History:        Electronic Cigarette/Vaping Assessment            Electronic Cigarette Use: Never.      Alcohol Assessment: Current      Tobacco Assessment            Never (less than 100 in lifetime)      Exercise and Physical Activity Assessment            Exercise frequency: Active.        Physical Examination   Measurements from flowsheet : Measurements   8/3/2021 2:55 PM CDT Height Measured - Standard 75 in   8/3/2021 2:46 PM CDT Height Measured - Standard 75 in    Weight Measured - Standard 192 lb    BSA 2.14 m2    Body Mass Index 24 kg/m2      General:  Alert and oriented, No acute distress.    Eye:  Normal conjunctiva.    HENT:  Normocephalic, Tympanic membranes are clear.    Neck:  Supple, No carotid bruit, No lymphadenopathy, No thyromegaly.    Respiratory:  Lungs are clear to auscultation, Respirations are non-labored.    Cardiovascular:  Normal rate, Regular rhythm, No murmur, Normal peripheral perfusion, No edema.    Gastrointestinal:  Soft, Non-tender.    Genitourinary:  No costovertebral angle tenderness.    Musculoskeletal:  degenerative changes noted.    Integumentary:  No rash.    Neurologic:  Alert, Oriented.       Review / Management   Results review      Impression and Plan   Diagnosis     Chronic Low Back Pain (UKD74-NH M54.5).     HTN (OAJ81-XT I10).     Statin declined (CFX39-IY Z53.20).     Course:  Progressing as expected.    Plan:  Problem #1 hypertension elevated pressures here but normal at home continue with lisinopril 5 mg daily      ???????#2 severe glaucoma on multiple treatments for that     3.  Chronic low back pain offered him referral to interventional spine     4.  Chronic shoulder pain offered her Ortho referral he will think about it     5.  Elevated lipids he declined statins    .         Follow-up: With Primary Care Provider, In 6 months.    Patient Instructions:       Counseled: Patient, Regarding diagnosis, Regarding treatment, Regarding  medications, Diet, Activity, Verbalized understanding.

## 2022-02-15 NOTE — LETTER
(Inserted Image. Unable to display)         December 24, 2020        SVETLANA MARCOS  [address]   [place], [place] [id number]        Dear SVETLANA,    Thank you for selecting [facility] for your healthcare needs.    Our records indicate you are due for the following services:     Hypertension check ~ Please remember to bring your at-home blood pressure readings with you to your appointment.     (FYI   Regarding office visits: In some instances, a video visit or telephone visit may be offered as an option.)    To schedule an appointment or if you have further questions, please contact your clinic at [phone].    Powered by [employer]    Sincerely,    Demond Tuttle MD

## 2022-02-15 NOTE — TELEPHONE ENCOUNTER
Entered by Carlie Gee CMA on February 04, 2020 11:04:56 AM CST  ---------------------  From: Carlie Gee CMA   To: [facility]    Sent: 2/4/2020 11:04:53 AM CST  Subject: Medication Management     ** Not Approved: PATIENT HAS FURTHER REFILS **  lisinopril (LISINOPRIL   TAB 5MG                - WW TABLET)  TAKE 1 TABLET BY MOUTH TWICE DAILY  Qty:  360 unknown unit        Days Supply:  0        Refills:  0          Substitutions Allowed     Route To Pharmacy - [facility]   Signed by Carlie Gee CMA            ** Patient matched by Carlie Gee CMA on 2/4/2020 11:03:03 AM CST **      ------------------------------------------  From: [facility]  To: Demond Tuttle MD  Sent: February 3, 2020 5:47:03 PM CST  Subject: Medication Management  Due: February 4, 2020 5:47:03 PM CST    ** On Hold Pending Signature **  Drug: lisinopril (lisinopril 5 mg oral tablet)  TAKE 1 TABLET BY MOUTH TWICE DAILY  Quantity: 360 unknown unit  Days Supply: 0  Refills: 0  Substitutions Allowed  Notes from Pharmacy:     Dispensed Drug: lisinopril (lisinopril 5 mg oral tablet)  TAKE 1 TABLET BY MOUTH TWICE DAILY  Quantity: 360 unknown unit  Days Supply: 0  Refills: 0  Substitutions Allowed  Notes from Pharmacy:   ------------------------------------------90 day supply with 1 refill sent 12/6/2019.    Pt due for f/u 06/2020.

## 2022-02-15 NOTE — TELEPHONE ENCOUNTER
---------------------  From: Galina More CMA   Sent: 6/16/2021 2:21:30 PM CDT  Subject: COVID results     Called and notified patient that COVID test was negative.

## 2022-02-15 NOTE — TELEPHONE ENCOUNTER
---------------------  From: Galina More CMA   Sent: 12/28/2020 3:26:23 PM CST  Subject: General Message     Patient called for Lisinopril refill. Called and notified patient that he is due for a 6 month follow up with TFS, will send in 30 day supply and patient will schedule a f/u for further refills in a few weeks.

## 2022-02-15 NOTE — PROGRESS NOTES
Patient:   SVETLANA MARCOS            MRN: [id number]            FIN: [id number]               Age:   63 years     Sex:  Male     :  1955   Associated Diagnoses:   Cataract   Author:   Demond Tuttle MD      Preoperative Information   Indication for surgery:  cataract/glaucoma.    Accompanied by:  No one.    Source of history:  Self.           Chief Complaint   2019 4:24 PM CST    Preop.  Associated Eye [facility]. Left eye Cataract 19.  Dr. Howard.        Review of Systems   Constitutional:  Negative.    Eye:  Negative.    Ear/Nose/Mouth/Throat:  Negative.    Respiratory:  Negative.    Cardiovascular:  Negative.    Gastrointestinal:  Negative.    Genitourinary:  Negative.    Hematology/Lymphatics:  Negative.    Endocrine:  Negative.    Immunologic:  Negative.    Musculoskeletal:  Negative.    Integumentary:  Negative.    Neurologic:  Negative.       Health Status   Allergies:    Allergic Reactions (Selected)  No known allergies   Medications:  (Selected)   Prescriptions  Prescribed  lisinopril 5 mg oral tablet: 1 tab(s) ( 5 mg ), po, bid, # 180 tab(s), 3 Refill(s), Type: Maintenance, Pharmacy: [facility] #[id number], 1 tab(s) Oral bid  Documented Medications  Documented  Daily Multiple Vitamins: PO, Daily, 0  Fish Oil oral capsule: 0  Flax Seed Oil: 0  Lumigan 0.01% ophthalmic solution: 1 drop(s), left eye, qpm, 0 Refill(s), Type: Maintenance  Lutein: PO, Daily, 0  Vitamin D: ( 1,000 unit(s) ), PO, 0  amoxicillin 500 mg oral tablet: See Instructions, Instructions: 4 tabs 1 hr prior to dental work., 0 Refill(s), Type: Soft Stop,    Medications          *denotes recorded medication          *amoxicillin 500 mg oral tablet: See Instructions, 4 tabs 1 hr prior to dental work..          *Lumigan 0.01% ophthalmic solution: 1 drop(s), left eye, qpm.          *Vitamin D: 1,000 unit(s), PO.          *Flax Seed Oil: 0.          lisinopril 5 mg oral tablet: 5 mg, 1 tab(s), po, bid, 180 tab(s), 3 Refill(s).           *Lutein: PO, Daily.          *Daily Multiple Vitamins: PO, Daily.          *Fish Oil oral capsule: 0.     Problem list:    All Problems  Benign essential HTN / SNOMED CT 6125482 / Confirmed  Chronic Low Back Pain / ICD-9-.2 / Confirmed  DJD (Degenerative Joint Disease) of Lumbar Spine / ICD-9-.3 / Confirmed      Histories   Past Medical History:    Active  Benign essential HTN (8154822): Onset on 2011 at 55 years.  Chronic Low Back Pain (724.2): Onset on 2010 at 55 years.  DJD (Degenerative Joint Disease) of Lumbar Spine (721.3)   Family History:    HEADACHE  Mother  Sister  CVA - Cerebrovascular accident  Mother  PVD - Peripheral vascular disease  Father ()     Procedure history:    Colonoscopy (SNOMED CT 970597328) on 2012 at 56 Years.  Comments:  7/10/2012 2:06 PM CDT - Daphne Lino MA  Normal colonoscopy repeat in 10 years  Colonoscopy (SNOMED CT 070708105) on 2004 at 48 Years.  Comments:  2010 11:08 AM LILYT - Lucia Middleton  rev. 2009  Total hip replacement (SNOMED CT 473683185) in  at 40 Years.  Comments:  2010 11:07 AM LILYT - Lucia Middleton  right hip - redue  Total hip replacement (SNOMED CT 622705473) in  at 32 Years.  Comments:  2010 11:07 AM Lucia Kim  right hip   Social History:        Alcohol Assessment: Current      Tobacco Assessment: Denies Tobacco Use      Exercise and Physical Activity Assessment            Exercise frequency: Active.     Has  no history of anemia.  Has no personal history of bleeding problems.   Has no personal or family history of anesthesia reactions.  S/he has not taken aspirin or aspirin containing products in the last week.     S/he has / has not been on corticosteroids for more than 2 weeks recently.    Chest pain / SOB walking up 2 flights of steps?no  CAD MI?no    Asthma  or Bronchitis? no  Diabetes? no       Insulin/Orals?          Physical Examination   Vital Signs    1/11/2019 4:24 PM CST Temperature Tympanic 97.1 DegF  LOW    Peripheral Pulse Rate 61 bpm    HR Method Electronic    Systolic Blood Pressure 127 mmHg    Diastolic Blood Pressure 75 mmHg    Mean Arterial Pressure 92 mmHg    BP Method Electronic      Measurements from flowsheet : Measurements   1/11/2019 4:24 PM CST    Weight Measured - Standard                190.8 lb     General:  Alert and oriented, No acute distress.    Eye:  Pupils are equal, round and reactive to light, Extraocular movements are intact.    HENT:  Normocephalic, Tympanic membranes are clear, Normal hearing, Oral mucosa is moist.    Neck:  Supple, Non-tender, No carotid bruit, No jugular venous distention, No lymphadenopathy, No thyromegaly.    Respiratory:  Lungs are clear to auscultation, Respirations are non-labored, Breath sounds are equal.    Cardiovascular:  Normal rate, Regular rhythm, No murmur, Good pulses equal in all extremities, No edema.    Gastrointestinal:  Soft, Non-tender, Non-distended, Normal bowel sounds, No organomegaly.    Musculoskeletal:  No tenderness, No swelling, No deformity.    Integumentary:  Warm, Dry.    Neurologic:  Alert, Oriented, Normal sensory, Normal motor function, No focal deficits, Cranial Nerves II-XII are grossly intact, Normal deep tendon reflexes.    Psychiatric:  Cooperative, Appropriate mood & affect, Normal judgment.       Health Maintenance      Recommendations     Pending (in the next year)        OverDue           Body Mass Index Check (Male) due  07/05/17  and every 1  year(s)        Due            Depression Screen (Male) due  01/11/19  and every 1  year(s)           Hepatitis C Screen 6206-4265 (Male) due  01/11/19  One-time only           Influenza Vaccine due  01/11/19  and every 1  year(s)           Lung Cancer Screen (Male) due  01/11/19  and every 1  year(s)           Zoster/Shingles Vaccine due  01/11/19  One-time only        Due In Future            Lipid Disorders Screen (Male) not  due until  07/10/19  and every 1  year(s)     Satisfied (in the past 1 year)        Satisfied            Aspirin Therapy for Prevention of CVD (Male) on  07/10/18.           High Blood Pressure Screen (Male) on  01/11/19.           High Blood Pressure Screen (Male) on  07/10/18.           Lipid Disorders Screen (Male) on  07/10/18.           Lipid Disorders Screen (Male) on  07/10/18.           Lipid Disorders Screen (Male) on  07/10/18.           Lipid Disorders Screen (Male) on  07/10/18.           Tobacco Use Screen (Male) on  01/11/19.           Tobacco Use Screen (Male) on  07/10/18.        Review / Management            Impression and Plan   Diagnosis     Cataract (JKH95-TZ H26.9).     Condition:  ok for surgery asa2.

## 2022-02-15 NOTE — TELEPHONE ENCOUNTER
---------------------  From: Florencia Lundy RN   Sent: 10/28/2021 12:14:26 PM CDT  Subject: [facility] Fax     [facility] calling to request that information form pre-op appointment be faxed to them.  The number we had was incorrect and they do not have access to our chart.  New number provided is [phone].  Re-faxed

## 2022-02-15 NOTE — PROGRESS NOTES
Patient:   SVETLANA MARCOS            MRN: [id number]            FIN: [id number]               Age:   62 years     Sex:  Male     :  1955   Associated Diagnoses:   Chronic Low Back Pain; HTN   Author:   Demond Tuttle MD      Visit Information      Date of Service: 07/10/2018 04:37 pm  Performing Location: [facility]  Encounter#: [id number]      Primary Care Provider (PCP):  Demond Tuttle MD    NPI# [id number]      Referring Provider:  Demond Tuttle MD    NPI# [id number]      Chief Complaint   7/10/2018 4:43 PM CDT    HTN checkup.        History of Present Illness   Overall things have been stable. His significant other is a nurse her name is Dottie    The patient is in today for his back.  and htn. chief complaint and symptoms as noted above confirmed with patient ..  He has the long-term back problems from previous injuries.  He has gone through [facility] twice   ..  He continues to mckoy it.  Tolerating ace well  .  He has had no other associated symptoms.    Nedds steroid taper 1x a year      Review of Systems   Constitutional:  No weakness, No fatigue, No decreased activity, No weight gain.    Eye:  No recent visual problem, No blurring, No double vision.    Ear/Nose/Mouth/Throat:  Negative.    Respiratory:  No shortness of breath, No cough.    Cardiovascular:  No chest pain, No peripheral edema.    Gastrointestinal:  No nausea, No vomiting, No diarrhea, No constipation, No abdominal pain.    Genitourinary:  No dysuria, No hematuria, No change in urine stream.    Hematology/Lymphatics:  Negative.    Endocrine:  Negative.    Immunologic:  Negative.    Musculoskeletal:  Negative except as documented in history of present illness.    Integumentary:  No rash.    Neurologic:  Alert and oriented X4.    Psychiatric:  Negative.              Health Status   Allergies:    Allergic Reactions (Selected)  No known allergies   Medications:  (Selected)    Prescriptions  Prescribed  Medrol Dosepak 4 mg oral tablet: 1 packet(s), PO, Once, Instructions: as directed on package labeling, # 21 tab(s), 0 Refill(s), Type: Soft Stop, Pharmacy: [facility] #[id number], 1 packet(s) Oral once,Instr:as directed on package labeling  lisinopril 5 mg oral tablet: 1 tab(s) ( 5 mg ), po, bid, # 180 tab(s), 3 Refill(s), Type: Maintenance, Pharmacy: [facility] #[id number], 1 tab(s) Oral bid  Documented Medications  Documented  Daily Multiple Vitamins: PO, Daily, 0  Fish Oil oral capsule: 0  Flax Seed Oil: 0  Lumigan 0.01% ophthalmic solution: 1 drop(s), left eye, qpm, 0 Refill(s), Type: Maintenance  Lutein: PO, Daily, 0  Vitamin D: ( 1,000 unit(s) ), PO, 0  amoxicillin 500 mg oral tablet: See Instructions, Instructions: 4 tabs 1 hr prior to dental work., 0 Refill(s), Type: Soft Stop  aspirin 81 mg oral tablet: 1 tab(s) ( 81 mg ), PO, Daily, # 30 tab(s), 0 Refill(s), Type: Maintenance,    Medications          *denotes recorded medication          *amoxicillin 500 mg oral tablet: See Instructions, 4 tabs 1 hr prior to dental work..          *aspirin 81 mg oral tablet: 81 mg, 1 tab(s), PO, Daily, 30 tab(s), 0 Refill(s).          *Lumigan 0.01% ophthalmic solution: 1 drop(s), left eye, qpm.          *Vitamin D: 1,000 unit(s), PO.          *Flax Seed Oil: 0.          lisinopril 5 mg oral tablet: 5 mg, 1 tab(s), po, bid, 180 tab(s), 3 Refill(s).          *Lutein: PO, Daily.          Medrol Dosepak 4 mg oral tablet: 1 packet(s), PO, Once, as directed on package labeling, 21 tab(s), 0 Refill(s).          *Daily Multiple Vitamins: PO, Daily.          *Fish Oil oral capsule: 0.     Problem list:    All Problems  Benign essential HTN / SNOMED CT 1420997 / Confirmed  Chronic Low Back Pain / ICD-9-.2 / Confirmed  DJD (Degenerative Joint Disease) of Lumbar Spine / ICD-9-.3 / Confirmed      Histories   Past Medical History:    Active  Benign essential HTN (1905174): Onset on 1/25/2011 at 55  years.  Chronic Low Back Pain (724.2): Onset on 2010 at 55 years.  DJD (Degenerative Joint Disease) of Lumbar Spine (721.3)   Family History:    HEADACHE  Mother  Sister  CVA - Cerebrovascular accident  Mother  PVD - Peripheral vascular disease  Father ()     Procedure history:    Colonoscopy (SNOMED CT 753828958) on 2012 at 56 Years.  Comments:  7/10/2012 2:06 PM - Daphne Lino MA  Normal colonoscopy repeat in 10 years  Colonoscopy (SNOMED CT 123739393) on 2004 at 48 Years.  Comments:  2010 11:08 AM - Lucia Middleton  rev. 2009  Total hip replacement (SNOMED CT 342326077) in  at 40 Years.  Comments:  2010 11:07 AM - Lucia Middleton  right hip - redue  Total hip replacement (SNOMED CT 993459490) in  at 32 Years.  Comments:  2010 11:07 AM - Lucia Middleton  right hip   Social History:        Alcohol Assessment: Current      Tobacco Assessment: Denies Tobacco Use      Exercise and Physical Activity Assessment            Exercise frequency: Active.        Physical Examination   Vital Signs   7/10/2018 4:43 PM CDT Temperature Tympanic 97.7 DegF  LOW    Peripheral Pulse Rate 65 bpm    HR Method Electronic    Systolic Blood Pressure 119 mmHg    Diastolic Blood Pressure 67 mmHg    Mean Arterial Pressure 84 mmHg    BP Method Electronic      Measurements from flowsheet : Measurements   7/10/2018 4:43 PM CDT    Weight Measured - Standard                193.8 lb     General:  Alert and oriented, No acute distress.    Eye:  Normal conjunctiva.    HENT:  No pharyngeal erythema.    Neck:  Supple, No carotid bruit, No lymphadenopathy, No thyromegaly.    Respiratory:  Lungs are clear to auscultation, Respirations are non-labored.    Cardiovascular:  Normal rate, Regular rhythm, No murmur, Normal peripheral perfusion, No edema.    Gastrointestinal:  Soft, Non-tender, No organomegaly.    Genitourinary:  No costovertebral angle tenderness.    Musculoskeletal:   degenerative changes noted.    Integumentary:  No rash.    Neurologic:  Alert, Oriented.       Review / Management   Results review      Impression and Plan   Diagnosis     Chronic Low Back Pain (TZQ65-BL M54.5).     HTN (WPE56-AD I10).     Course:  Progressing as expected.    Plan:       Follow-up: With Primary Care Provider, In 12 months.    Patient Instructions:       Counseled: Patient, Regarding diagnosis, Regarding treatment, Regarding medications, Diet, Activity, Verbalized understanding.

## 2022-02-15 NOTE — TELEPHONE ENCOUNTER
---------------------  From: Glory Ardon RN   To: [employer] Message Pool ([id number]_[place] - [place]);     Sent: 7/9/2020 12:49:09 PM CDT  Subject: EKG     EKG complete per TFS.  Pt tolerated well.  Result printed and put in TFS box for review.noted

## 2022-02-15 NOTE — NURSING NOTE
Seen for COVID testing at [facility] per outside order    O2 Sat = 97%  (Children under 12 do not require O2 sat)    Specimen sent to:   labs for testing    PUI form faxed to [facility] if positive    results will be faxed to [facility] [phone]

## 2022-02-15 NOTE — LETTER
(Inserted Image. Unable to display)            April 28, 2021        SVETLANA MARCOS      [address] [place] [address]        Dear SVETLANA,    Thank you for selecting [facility]  for your healthcare needs.  Below you will find the results of the recent tests done at our clinic.     Work on low cholesterol diet and recheck in 1 year.      Result Name Current Result Previous Result Reference Range   Potassium Level (mmol/L)  4.8 4/27/2021  4.6 6/24/2020 3.5 - 5.3   Glucose Level (mg/dL)  95 4/27/2021  87 6/24/2020 65 - 99   Creatinine Level (mg/dL)  0.96 4/27/2021  1.01 6/24/2020 0.70 - 1.25   Cholesterol (mg/dL) ((H)) 228 4/27/2021 ((H)) 223 6/24/2020  - <200   HDL (mg/dL)  70 4/27/2021  68 6/24/2020 > OR = 40 -    LDL ((H)) 143 4/27/2021 ((H)) 139 6/24/2020    Triglyceride (mg/dL)  61 4/27/2021  65 6/24/2020  - <150       Please contact me or my assistant at [phone] if you have any questions.     Sincerely,        Demond Tuttle MD        What do your labs mean?  Below is a glossary of commonly ordered labs:  LDL   Bad Cholesterol   HDL   Good Cholesterol  AST/ALT   Liver Function   Cr/Creatinine   Kidney Function  Microalbumin   Kidney Function  BUN   Kidney Function  PSA   Prostate    TSH   Thyroid Hormone  HgbA1c   Diabetes Test   Hgb (Hemoglobin)   Red Blood Cells

## 2022-02-15 NOTE — TELEPHONE ENCOUNTER
---------------------  From: Ashli Elkins LPN (Phone Messages Pool ([id number]_[facility]))   To: Phone Messages Pool ([id number]_[place] - [place]);     Sent: 2/5/2020 2:30:59 PM CST  Subject: lisinopril     Phone Message    PCP:   MICHELE      Time of Call:  1:54pm       Person Calling:  Alber- [facility]  Phone number:  [phone]    Note:   Alber LM stating pt has Rx for lisinopril 5mg 1 tab PO BID. He says they have been trying to get Rx filled but got denial stating he should have refills.    Alber says the last correspondence they have is from 6/28/19 when they received a denial saying Rx was sent 5/24/19 for #180 with 1 refill- he says they used that as record of Rx at that time. Pt is now almost out and needs refill.    Per Rx from 12/6/19 Rx was sent to [facility]. [facility] is not on pt's pharmacy list. LM for pt to call back- want to know if Rx is suppose to be at [facility] or [facility].    Last office visit and reason:  12/6/19 HTN3:18pm Pt calls back and asks for a return call @ [phone].    3:31pm Spoke with pt. Rxs had been sent to [facility]. Pt is not sure why, he says that he has always used [facility]. I resent the prescriptions to [facility] with same directions, quantities, and refills as originally sent. Removed [facility] from his list of pharmacies.

## 2022-02-15 NOTE — PROGRESS NOTES
Patient:   SVETLANA MARCOS            MRN: [id number]            FIN: [id number]               Age:   65 years     Sex:  Male     :  1955   Associated Diagnoses:   Chronic Low Back Pain; HTN; Statin declined   Author:   Demond Tuttle MD      Visit Information      Date of Service: 2021 10:35 am  Performing Location: [facility]  Encounter#: [id number]      Primary Care Provider (PCP):  Demond Tuttle MD    NPI# [id number]      Referring Provider:  Demond Tuttle MD# [id number]      Chief Complaint   2021 10:46 AM CST    Patient presents for CVD follow up. Also medication refills.        History of Present Illness   Patient is here for 6-month follow-up.  He has been doing well blood pressures are 1 teens over 70s at home.  He has been retired now for a year his significant other just retired in December.  He continues on his lisinopril daily.  He declines statins.  He does struggle with glaucoma has had previous procedures sees ophthalmology regularly for that.         Review of Systems   Constitutional:  No weakness, No fatigue, No decreased activity, No weight gain.    Eye:  No recent visual problem, No blurring, No double vision.    Ear/Nose/Mouth/Throat:  Negative.    Respiratory:  No shortness of breath, No cough.    Cardiovascular:  No chest pain, No peripheral edema.    Gastrointestinal:  No nausea, No vomiting, No diarrhea, No constipation, No abdominal pain.    Genitourinary:  No dysuria, No hematuria, No change in urine stream.    Hematology/Lymphatics:  Negative.    Endocrine:  Negative.    Immunologic:  Negative.    Musculoskeletal:  Negative except as documented in history of present illness.    Integumentary:  No rash.    Neurologic:  Alert and oriented X4.    Psychiatric:  Negative.              Health Status   Allergies:    Allergic Reactions (Selected)  No Known Medication Allergies   Medications:  (Selected)   Prescriptions  Prescribed  lisinopril 5 mg  oral tablet: = 1 tab(s), Oral, daily, # 90 tab(s), 1 Refill(s), Type: Maintenance, Pharmacy: [facility] #[id number], 1 tab(s) Oral daily, 75, in, 20 10:42:00 CDT, Height Measured, 190, lb, 21 10:46:00 CST, Weight Measured  Documented Medications  Documented  Daily Multiple Vitamins: PO, Daily, 0  Fish Oil oral capsule: 0  Flax Seed Oil: 0  Lutein: PO, Daily, 0  Rocklaton: Rocklaton, drop 1, Eye-Left, hs, 0 Refill(s), Type: Maintenance  Simbraza: Simbraza, 1 drop, Eye-Left, bid, 0 Refill(s), Type: Maintenance  Vitamin D: ( 1,000 unit(s) ), PO, 0  calcium (as calcium citrate) 250 mg oral tablet: = 1 tab(s) ( 250 mg ), Oral, daily, 0 Refill(s), Type: Maintenance   Problem list:    All Problems  Benign essential HTN / SNOMED CT 6043788 / Confirmed  Chronic Low Back Pain / ICD-9-.2 / Confirmed  DJD (Degenerative Joint Disease) of Lumbar Spine / ICD-9-.3 / Confirmed      Histories   Past Medical History:    Active  Benign essential HTN (3777737): Onset on 2011 at 55 years.  Chronic Low Back Pain (724.2): Onset on 2010 at 55 years.  DJD (Degenerative Joint Disease) of Lumbar Spine (721.3)   Family History:    HEADACHE  Mother  Sister  CVA - Cerebrovascular accident  Mother  PVD - Peripheral vascular disease  Father ()     Procedure history:    Repair of inguinal hernia - Bilateral (SNOMED CT 99027383) on 2020 at 64 Years.  Colonoscopy (SNOMED CT 757993966) on 2012 at 56 Years.  Comments:  7/10/2012 2:06 PM CDT - Daphne Lino MA  Normal colonoscopy repeat in 10 years  Colonoscopy (SNOMED CT 843365545) on 2004 at 48 Years.  Comments:  2010 11:08 AM CDT - Lucia Middleton 2009  Total hip replacement (SNOMED CT 643498432) in  at 40 Years.  Comments:  2010 11:07 AM Lucia Kim  right hip - redue  Total hip replacement (SNOMED CT 441103996) in  at 32 Years.  Comments:  2010 11:07 AM SHELLEY Middleton  Lucia  right hip   Social History:        Electronic Cigarette/Vaping Assessment            Electronic Cigarette Use: Never.      Alcohol Assessment: Current      Tobacco Assessment: Denies Tobacco Use            Never (less than 100 in lifetime)      Exercise and Physical Activity Assessment            Exercise frequency: Active.        Physical Examination   Measurements from flowsheet : Measurements   1/8/2021 10:46 AM CST    Weight Measured - Standard                190 lb     General:  Alert and oriented, No acute distress.    Eye:  Normal conjunctiva.    HENT:  Normocephalic, Tympanic membranes are clear.    Neck:  Supple, No carotid bruit, No lymphadenopathy, No thyromegaly.    Respiratory:  Lungs are clear to auscultation, Respirations are non-labored.    Cardiovascular:  Normal rate, Regular rhythm, No murmur, Normal peripheral perfusion, No edema.    Gastrointestinal:  Soft, Non-tender.    Genitourinary:  No costovertebral angle tenderness.    Musculoskeletal:  degenerative changes noted.    Integumentary:  No rash.    Neurologic:  Alert, Oriented.       Review / Management   Results review      Impression and Plan   Diagnosis     Chronic Low Back Pain (PEU08-LT M54.5).     HTN (BZS08-NP I10).     Statin declined (IVE96-AN Z53.20).     Course:  Progressing as expected.    Plan:  Overall doing well his blood pressures under reasonable control weight no changes he does have his chronic back problems from his arthritis.  He declines a statin for his cholesterol  .         Follow-up: With Primary Care Provider, In 6 months.    Patient Instructions:       Counseled: Patient, Regarding diagnosis, Regarding treatment, Regarding medications, Diet, Activity, Verbalized understanding.

## 2022-02-15 NOTE — TELEPHONE ENCOUNTER
Entered by Adali Rich CMA on September 17, 2021 8:58:27 AM CDT  ---------------------  From: Adali Rich CMA   To: [facility] #[id number]    Sent: 9/17/2021 8:58:27 AM CDT  Subject: Medication Management     ** Not Approved: Refill not appropriate **  predniSONE (PREDNISONE 10MG** TABLETS)  TAKE 4 DAILY X 3 DAYS, THEN 3 DAILY X 3 DAYS, THEN 2 DAILY X 3 DAYS, THEN 1 DAILY X 3 DAYS.  Qty:  30 tab(s)        Days Supply:  12        Refills:  0          Substitutions Allowed     Route To Pharmacy - [facility] #[id number]   Signed by Adali Rich CMA            ------------------------------------------  From: [facility] #[id number]  To: Demond Tuttle MD  Sent: September 15, 2021 9:43:33 AM CDT  Subject: Medication Management  Due: August 20, 2021 11:17:32 AM CDT     ** On Hold Pending Signature **     Dispensed Drug: predniSONE (predniSONE 10 mg oral tablet), TAKE 4 DAILY X 3 DAYS, THEN 3 DAILY X 3 DAYS, THEN 2 DAILY X 3 DAYS, THEN 1 DAILY X 3 DAYS.  Quantity: 30 tab(s)  Days Supply: 12  Refills: 0  Substitutions Allowed  Notes from Pharmacy:  ------------------------------------------

## 2022-02-15 NOTE — TELEPHONE ENCOUNTER
Entered by Violeta Fernandez CMA on June 28, 2019 9:53:19 AM CDT  ---------------------  From: Violeta Fernandez CMA   To: [facility]    Sent: 6/28/2019 9:53:18 AM CDT  Subject: Medication Management     ** Not Approved: Refill not appropriate, Filled 5/24/19 for #180 and 1 **  lisinopril (LISINOPRIL   TAB 5MG TABLET)  TAKE 1 TABLET BY MOUTH TWICE DAILY  Qty:  360 unknown unit        Days Supply:  0        Refills:  0          Substitutions Allowed     Route To Pharmacy - [facility]   Signed by iVoleta Fernandez CMA            ** Patient matched by Violeta Fernandez CMA on 6/28/2019 9:52:43 AM CDT **      ------------------------------------------  From: [facility]  To: Demond Tuttle MD  Sent: June 27, 2019 2:24:04 PM CDT  Subject: Medication Management  Due: June 28, 2019 2:24:04 PM CDT    ** On Hold Pending Signature **  Drug: lisinopril (lisinopril 5 mg oral tablet)  TAKE 1 TABLET BY MOUTH TWICE DAILY  Quantity: 360 unknown unit Days Supply: 0         Refills: 0  Substitutions Allowed  Notes from Pharmacy:     Dispensed Drug: lisinopril (lisinopril 5 mg oral tablet)  TAKE 1 TABLET BY MOUTH TWICE DAILY  Quantity: 360 unknown unit Days Supply: 0         Refills: 0  Substitutions Allowed  Notes from Pharmacy:   ------------------------------------------

## 2022-02-15 NOTE — NURSING NOTE
Comprehensive Intake Entered On:  6/24/2020 10:43 AM CDT    Performed On:  6/24/2020 10:42 AM CDT by Kimmy Drummond               Summary   Chief Complaint :   chronic disease visit   Weight Measured :   187.6 lb(Converted to: 187 lb 10 oz, 85.09 kg)    Height Measured :   75 in(Converted to: 6 ft 3 in, 190.50 cm)    Body Mass Index :   23.45 kg/m2   Body Surface Area :   2.12 m2   Systolic Blood Pressure :   118 mmHg   Diastolic Blood Pressure :   71 mmHg   Mean Arterial Pressure :   87 mmHg   Peripheral Pulse Rate :   72 bpm   BP Method :   Electronic   HR Method :   Electronic   Temperature Tympanic :   97.5 DegF(Converted to: 36.4 DegC)  (LOW)    Kimmy Drummond - 6/24/2020 10:42 AM CDT   Health Status   Allergies Verified? :   Yes   Medication History Verified? :   Yes   Pre-Visit Planning Status :   Completed   Tobacco Use? :   Never smoker   Kimmy Drummond - 6/24/2020 10:42 AM CDT   ID Risk Screen   Recent Travel History :   No recent travel   Family Member Travel History :   No recent travel   Other Exposure to Infectious Disease :   Unknown   Kimmy Drummond - 6/24/2020 10:42 AM CDT

## 2022-02-15 NOTE — NURSING NOTE
Comprehensive Intake Entered On:  1/8/2021 10:55 AM CST    Performed On:  1/8/2021 10:46 AM CST by Jesika Hale CMA               Summary   Chief Complaint :   Patient presents for CVD follow up. Also medication refills.   Weight Measured :   190 lb(Converted to: 190 lb 0 oz, 86.183 kg)    Systolic Blood Pressure :   132 mmHg (HI)    Diastolic Blood Pressure :   80 mmHg   Mean Arterial Pressure :   97 mmHg   Peripheral Pulse Rate :   51 bpm (LOW)    BP Site :   Right arm   BP Method :   Electronic   HR Method :   Electronic   Temperature Tympanic :   97.1 DegF(Converted to: 36.2 DegC)  (LOW)    Jesika Hale CMA - 1/8/2021 10:46 AM CST   Health Status   Allergies Verified? :   Yes   Medication History Verified? :   Yes   Pre-Visit Planning Status :   Not completed   Tobacco Use? :   Never smoker   Jesika Hale CMA - 1/8/2021 10:46 AM CST   Consents   Consent for Immunization Exchange :   Consent Granted   Consent for Immunizations to Providers :   Consent Granted   Jesika Hale CMA - 1/8/2021 10:46 AM CST   Meds / Allergies   (As Of: 1/8/2021 10:55:19 AM CST)   Allergies (Active)   No Known Medication Allergies  Estimated Onset Date:   Unspecified ; Created By:   Jesika Hale CMA; Reaction Status:   Active ; Category:   Drug ; Substance:   No Known Medication Allergies ; Type:   Allergy ; Updated By:   Jesika Hale CMA; Reviewed Date:   1/8/2021 10:50 AM CST        Medication List   (As Of: 1/8/2021 10:55:19 AM CST)   Prescription/Discharge Order    lisinopril  :   lisinopril ; Status:   Prescribed ; Ordered As Mnemonic:   lisinopril 5 mg oral tablet ; Simple Display Line:   1 tab(s), Oral, daily, 30 tab(s), 0 Refill(s) ; Ordering Provider:   Demond Tuttle MD; Catalog Code:   lisinopril ; Order Dt/Tm:   12/29/2020 4:16:53 AM CST          predniSONE  :   predniSONE ; Status:   Prescribed ; Ordered As Mnemonic:   predniSONE 10 mg oral tablet ; Simple Display Line:   4 tabs daily for 3 days  taper bey 1 tab every 3 days, PO, Daily, 30 tab(s), 1 Refill(s) ; Ordering Provider:   Demond Tuttle MD; Catalog Code:   predniSONE ; Order Dt/Tm:   6/26/2020 5:13:04 PM CDT            Home Meds    calcium citrate  :   calcium citrate ; Status:   Documented ; Ordered As Mnemonic:   calcium (as calcium citrate) 250 mg oral tablet ; Simple Display Line:   250 mg, 1 tab(s), Oral, daily, 0 Refill(s) ; Catalog Code:   calcium citrate ; Order Dt/Tm:   12/6/2019 2:22:31 PM CST          ergocalciferol  :   ergocalciferol ; Status:   Documented ; Ordered As Mnemonic:   Vitamin D ; Simple Display Line:   1,000 unit(s), PO ; Catalog Code:   ergocalciferol ; Order Dt/Tm:   2/9/2010 6:07:01 PM CST          flax  :   flax ; Status:   Documented ; Ordered As Mnemonic:   Flax Seed Oil ; Catalog Code:   flax ; Order Dt/Tm:   2/9/2010 6:06:30 PM CST          lutein  :   lutein ; Status:   Documented ; Ordered As Mnemonic:   Lutein ; Simple Display Line:   PO, Daily ; Catalog Code:   lutein ; Order Dt/Tm:   2/9/2010 6:07:42 PM CST          Miscellaneous Prescription  :   Miscellaneous Prescription ; Status:   Documented ; Ordered As Mnemonic:   Rocklaton ; Simple Display Line:   drop 1, Eye-Left, hs, 0 Refill(s) ; Catalog Code:   Miscellaneous Prescription ; Order Dt/Tm:   1/8/2021 10:51:59 AM CST          Miscellaneous Prescription  :   Miscellaneous Prescription ; Status:   Documented ; Ordered As Mnemonic:   Simbraza ; Simple Display Line:   1 drop, Eye-Left, bid, 0 Refill(s) ; Catalog Code:   Miscellaneous Prescription ; Order Dt/Tm:   1/8/2021 10:51:53 AM CST          multivitamin  :   multivitamin ; Status:   Documented ; Ordered As Mnemonic:   Daily Multiple Vitamins ; Simple Display Line:   PO, Daily ; Catalog Code:   multivitamin ; Order Dt/Tm:   1/28/2010 1:48:22 PM CST          omega-3 polyunsaturated fatty acids  :   omega-3 polyunsaturated fatty acids ; Status:   Documented ; Ordered As Mnemonic:   Fish Oil oral  capsule ; Catalog Code:   omega-3 polyunsaturated fatty acids ; Order Dt/Tm:   2/9/2010 6:06:06 PM CST            ID Risk Screen   Recent Travel History :   No recent travel   Family Member Travel History :   No recent travel   Other Exposure to Infectious Disease :   Unknown   Jesika Hale CMA - 1/8/2021 10:46 AM CST   Social History   Social History   (As Of: 1/8/2021 10:55:19 AM CST)   Alcohol:  Current      (Last Updated: 4/23/2010 11:08:37 AM CDT by Lucia Robertson CMA )         Tobacco:  Denies Tobacco Use      Never (less than 100 in lifetime)   (Last Updated: 1/8/2021 10:47:12 AM CST by Jesika Hale CMA)          Electronic Cigarette/Vaping:        Electronic Cigarette Use: Never.   (Last Updated: 1/8/2021 10:47:21 AM CST by Jesika Hale CMA)          Exercise:        Exercise frequency: Active.   (Last Updated: 12/6/2011 1:00:21 PM CST by Jayla Sepulveda)

## 2022-02-17 ENCOUNTER — OFFICE VISIT - RIVER FALLS (OUTPATIENT)
Dept: FAMILY MEDICINE | Facility: CLINIC | Age: 67
End: 2022-02-17
Payer: COMMERCIAL

## 2022-02-18 ENCOUNTER — COMMUNICATION - RIVER FALLS (OUTPATIENT)
Dept: FAMILY MEDICINE | Facility: CLINIC | Age: 67
End: 2022-02-18
Payer: COMMERCIAL

## 2022-02-18 LAB
BUN SERPL-MCNC: 26 MG/DL (ref 7–25)
BUN/CREAT RATIO - HISTORICAL: 26 (ref 6–22)
C REACTIVE PROTEIN LHE: 0.8 MG/L
CALCIUM SERPL-MCNC: 9.7 MG/DL (ref 8.6–10.3)
CHLORIDE BLD-SCNC: 104 MMOL/L (ref 98–110)
CHOLEST SERPL-MCNC: 220 MG/DL
CHOLEST/HDLC SERPL: 3.2 {RATIO}
CO2 SERPL-SCNC: 30 MMOL/L (ref 20–32)
CREAT SERPL-MCNC: 1 MG/DL (ref 0.7–1.25)
EGFRCR SERPLBLD CKD-EPI 2021: 78 ML/MIN/1.73M2
ERYTHROCYTE [SEDIMENTATION RATE] IN BLOOD BY WESTERGREN METHOD: 2 MM/H
GLUCOSE BLD-MCNC: 99 MG/DL (ref 65–99)
HDLC SERPL-MCNC: 69 MG/DL
LDLC SERPL CALC-MCNC: 134 MG/DL
NONHDLC SERPL-MCNC: 151 MG/DL
POTASSIUM BLD-SCNC: 5.5 MMOL/L (ref 3.5–5.3)
PSA SERPL-MCNC: 0.79 NG/ML
SODIUM SERPL-SCNC: 139 MMOL/L (ref 135–146)
TRIGL SERPL-MCNC: 75 MG/DL

## 2022-02-24 ENCOUNTER — COMMUNICATION - RIVER FALLS (OUTPATIENT)
Dept: FAMILY MEDICINE | Facility: CLINIC | Age: 67
End: 2022-02-24
Payer: COMMERCIAL

## 2022-02-28 ENCOUNTER — TRANSFERRED RECORDS (OUTPATIENT)
Dept: HEALTH INFORMATION MANAGEMENT | Facility: CLINIC | Age: 67
End: 2022-02-28
Payer: COMMERCIAL

## 2022-02-28 LAB — COLOGUARD-ABSTRACT: POSITIVE

## 2022-03-02 VITALS
SYSTOLIC BLOOD PRESSURE: 147 MMHG | HEART RATE: 75 BPM | BODY MASS INDEX: 24.25 KG/M2 | WEIGHT: 194 LBS | DIASTOLIC BLOOD PRESSURE: 82 MMHG

## 2022-03-02 NOTE — LETTER
(Inserted Image. Unable to display)   February 04, 2022  SVETLANA MARCOS  [address] [place] [address]        Dear SVETLANA,    Thank you for selecting [facility] for your healthcare needs.    Our records indicate you are due for the following services:     Hypertension check ~ Please remember to bring your at-home blood pressure readings with you to your appointment.     (FYI   Regarding office visits: In some instances, a video visit or telephone visit may be offered as an option.)    To schedule an appointment or if you have further questions, please contact your clinic at [phone].    Powered by [employer]    Sincerely,    Demond Tuttle MD

## 2022-03-02 NOTE — LETTER
(Inserted Image. Unable to display)            February 18, 2022        SVETLANA MARCOS      [address] [place] [address]        Dear SVETLANA,    Thank you for selecting [facility]  for your healthcare needs.  Below you will find the results of the recent tests done at our clinic.     All labs acceptable.      Result Name Current Result Reference Range   PSA (ng/mL)  0.79 2/17/2022  - < OR = 4.00       Please contact me or my assistant at [phone] if you have any questions.     Sincerely,        Demond Tuttle MD        What do your labs mean?  Below is a glossary of commonly ordered labs:  LDL   Bad Cholesterol   HDL   Good Cholesterol  AST/ALT   Liver Function   Cr/Creatinine   Kidney Function  Microalbumin   Kidney Function  BUN   Kidney Function  PSA   Prostate    TSH   Thyroid Hormone  HgbA1c   Diabetes Test   Hgb (Hemoglobin)   Red Blood Cells

## 2022-03-02 NOTE — PROGRESS NOTES
Patient:   SVETLANA MARCOS            MRN: [id number]            FIN: [id number]               Age:   66 years     Sex:  Male     :  1955   Associated Diagnoses:   HTN; Chronic Low Back Pain   Author:   Demond Tuttle MD      Visit Information      Date of Service: 2022 10:33 am  Performing Location: [facility]  Encounter#: [id number]      Primary Care Provider (PCP):  Demond Tuttle MD    NPI# [id number]      Referring Provider:  No referring provider recorded for selected visit.      History of Present Illness   Patient is here for 6-month follow-up.  Blood pressures have been very good at home.  He struggling with his severe glaucoma better since last surgery.  He continues to mckoy his chronic low back pain and bilateral shoulder pain.         Review of Systems   Constitutional:  Negative except as documented in history of present illness.    Eye:  Negative.    Ear/Nose/Mouth/Throat:  Negative.    Respiratory:  Negative.    Cardiovascular:  Negative.    Gastrointestinal:  Negative.    Genitourinary:  Negative.    Hematology/Lymphatics:  Negative.    Endocrine:  Negative.    Immunologic:  Negative.    Musculoskeletal:  Negative except as documented in history of present illness.    Integumentary:  Negative.    Neurologic:  Negative.       Health Status   Allergies:    Allergic Reactions (Selected)  No Known Medication Allergies   Medications:  (Selected)   Prescriptions  Prescribed  celecoxib 100 mg oral capsule: = 1 cap(s), Oral, bid, PRN: AS NEEDED FOR PAIN, # 180 cap(s), 0 Refill(s), Type: Maintenance, Pharmacy: [facility] #[id number], 1 cap(s) Oral bid,PRN:AS NEEDED FOR PAIN, 75, in, 10/26/21 10:34:00 CDT, Height Measured, 194, lb, 22 9:35:00 CS...  lisinopril 10 mg oral tablet: = 1 tab(s) ( 10 mg ), Oral, bid, # 180 tab(s), 1 Refill(s), Type: Maintenance, Pharmacy: [facility] #[id number], 1 tab(s) Oral bid, 75, in, 10/26/21 10:34:00 CDT, Height Measured, 194,  lb, 22 9:35:00 CST, Weight Measured  predniSONE 10 mg oral tablet: 4 tabs daily for 3 days taper bey 1 tab every 3 days, Oral, daily, # 30 tab(s), 0 Refill(s), Type: Maintenance, Pharmacy: [facility] #[id number], 4 tabs daily for 3 days taper bey 1 tab every 3 days Oral daily, 75, in, 10/26/21 10:34:00 CDTMo...  Documented Medications  Documented  Daily Multiple Vitamins: PO, Daily, 0  Fish Oil oral capsule: 0  Flax Seed Oil: 0  Lutein: PO, Daily, 0  Vitamin D: ( 1,000 unit(s) ), PO, 0  calcium (as calcium citrate) 250 mg oral tablet: = 1 tab(s) ( 250 mg ), Oral, daily, 0 Refill(s), Type: Maintenance   Problem list:    All Problems  DJD (Degenerative Joint Disease) of Lumbar Spine / ICD-9-.3 / Confirmed  Chronic Low Back Pain / ICD-9-.2 / Confirmed  Benign essential HTN / SNOMED CT 0458172 / Confirmed  Statin declined / SNOMED CT 560411011 / Confirmed  Elevated lipids / SNOMED CT 362678031 / Confirmed      Histories   Past Medical History:    Active  Benign essential HTN (1688240): Onset on 2011 at 55 years.  Chronic Low Back Pain (724.2): Onset on 2010 at 55 years.  DJD (Degenerative Joint Disease) of Lumbar Spine (721.3)   Family History:    HEADACHE  Mother  Sister  CVA - Cerebrovascular accident  Mother  PVD - Peripheral vascular disease  Father ()     Procedure history:    Repair of inguinal hernia - Bilateral (SNOMED CT 65518618) on 2020 at 64 Years.  Colonoscopy (SNOMED CT 494862510) on 2012 at 56 Years.  Comments:  7/10/2012 2:06 PM CDT - Daphne Lino MA  Normal colonoscopy repeat in 10 years  Colonoscopy (SNOMED CT 530941314) on 2004 at 48 Years.  Comments:  2010 11:08 AM CDT - Lucia Middleton  rev. 2009  Total hip replacement (SNOMED CT 088576453) in  at 40 Years.  Comments:  2010 11:07 AM CDT - Lucia Middleton  right hip - redue  Total hip replacement (SNOMED CT 147279166) in  at 32 Years.  Comments:  2010  11:07 AM CDT - Lucia Middleton  right hip   Social History:        Electronic Cigarette/Vaping Assessment            Electronic Cigarette Use: Never.            Electronic Cigarette Use: Never.      Alcohol Assessment: Current      Tobacco Assessment            Never (less than 100 in lifetime)            Never (less than 100 in lifetime)      Exercise and Physical Activity Assessment            Exercise frequency: Active.        Physical Examination   General:  Alert and oriented, No acute distress.    Neck:  Supple, Non-tender, No carotid bruit, No jugular venous distention.    Respiratory:  Lungs are clear to auscultation, Respirations are non-labored.    Cardiovascular:  Normal rate, Regular rhythm.    Gastrointestinal:  Soft, Non-tender.    Musculoskeletal:  degenerative changes noted.    Neurologic:  Alert, Oriented, No focal deficits.       Impression and Plan   Diagnosis     HTN (WDC00-RK I10).     Chronic Low Back Pain (GFP37-LP M54.50).     Plan:  #1 hypertension elevated pressures increase lisinopril bmp 4 weeks     ???????2 severe glaucoma on multiple treatments for that     3.  Chronic low back pain    4.  Chronic shoulder pain   5.  Elevated lipids he declined statins.

## 2022-03-02 NOTE — LETTER
(Inserted Image. Unable to display)            February 18, 2022        SVETLANA MARCOS      [address] [place] [address]        Dear SVETLANA,    Thank you for selecting [facility]  for your healthcare needs.  Below you will find the results of the recent tests done at our clinic.     Your potassium level is elevated. We will recheck with your labs in 4 weeks.      Result Name Current Result Previous Result Reference Range   Cholesterol (mg/dL) ((H)) 220 2/17/2022 ((H)) 228 4/27/2021  - <200   HDL (mg/dL)  69 2/17/2022  70 4/27/2021 > OR = 40 -    Triglyceride (mg/dL)  75 2/17/2022  61 4/27/2021  - <150   LDL ((H)) 134 2/17/2022 ((H)) 143 4/27/2021    Cholesterol/HDL Ratio  3.2 2/17/2022  3.3 4/27/2021  - <5.0   Non-HDL Cholesterol ((H)) 151 2/17/2022 ((H)) 158 4/27/2021  - <130   Glucose Level (mg/dL)  99 2/17/2022  95 4/27/2021 65 - 99   BUN (mg/dL) ((H)) 26 2/17/2022  21 4/27/2021 7 - 25   Creatinine Level (mg/dL)  1.00 2/17/2022  0.96 4/27/2021 0.70 - 1.25   eGFR (mL/min/1.73m2)  78 2/17/2022  82 4/27/2021 > OR = 60 -    eGFR  (mL/min/1.73m2)  90 2/17/2022  95 4/27/2021 > OR = 60 -    BUN/Creat Ratio ((H)) 26 2/17/2022  NOT APPLICABLE 4/27/2021 6 - 22   Sodium Level (mmol/L)  139 2/17/2022  139 4/27/2021 135 - 146   Potassium Level (mmol/L) ((H)) 5.5 2/17/2022  4.8 4/27/2021 3.5 - 5.3   Chloride Level (mmol/L)  104 2/17/2022  105 4/27/2021 98 - 110   CO2 Level (mmol/L)  30 2/17/2022  27 4/27/2021 20 - 32   Calcium Level (mg/dL)  9.7 2/17/2022  10.0 4/27/2021 8.6 - 10.3       Please contact me or my assistant at [phone] if you have any questions.     Sincerely,        Demond Tuttle MD        What do your labs mean?  Below is a glossary of commonly ordered labs:  LDL   Bad Cholesterol   HDL   Good Cholesterol  AST/ALT   Liver Function   Cr/Creatinine   Kidney Function  Microalbumin   Kidney Function  BUN   Kidney Function  PSA   Prostate    TSH    Thyroid Hormone  HgbA1c   Diabetes Test   Hgb (Hemoglobin)   Red Blood Cells

## 2022-03-02 NOTE — TELEPHONE ENCOUNTER
Entered by Lilian Hinson CMA on February 07, 2022 5:52:34 PM CST  ---------------------  From: Lilian Hinson CMA   To: [facility] #[id number]    Sent: 2/7/2022 5:52:34 PM CST  Subject: Medication Management     ** Not Approved: filled today with protocol refill - pt due for visit **  lisinopril (LISINOPRIL 5MG TABLETS)  TAKE 1 TABLET BY MOUTH DAILY  Qty:  90 tab(s)        Days Supply:  90        Refills:  0          Substitutions Allowed     Route To [facility] #[id number]   Signed by Lilian Hinson CMA            ** Not Approved: duplicate **  lisinopril (LISINOPRIL 5MG TABLETS)  TAKE 1 TABLET BY MOUTH DAILY  Qty:  90 tab(s)        Days Supply:  90        Refills:  0          Substitutions Allowed     Route To [id number]   Note from Pharmacy:  **Patient requests 90 days supply**  Signed by Lilian Hinson CMA            ------------------------------------------  From: [facility] #[id number]  To: Demond Tuttle MD  Sent: February 4, 2022 11:59:54 AM CST  Subject: Medication Management  Due: January 21, 2022 11:05:10 AM CST     ** On Hold Pending Signature **     Dispensed Drug: lisinopril (lisinopril 5 mg oral tablet), TAKE 1 TABLET BY MOUTH DAILY  Quantity: 90 tab(s)  Days Supply: 90  Refills: 0  Substitutions Allowed  Notes from Pharmacy:     ** On Hold Pending Signature **     Dispensed Drug: lisinopril (lisinopril 5 mg oral tablet), TAKE 1 TABLET BY MOUTH DAILY  Quantity: 90 tab(s)  Days Supply: 90  Refills: 0  Substitutions Allowed  Notes from Pharmacy: **Patient requests 90 days supply**  ------------------------------------------

## 2022-03-02 NOTE — TELEPHONE ENCOUNTER
Entered by Tori Arevalo MA on February 22, 2022 9:51:57 AM CST  ---------------------  From: Tori Arevalo MA   To: [email or web address] #[email or web address]    Sent: 2/22/2022 9:51:57 AM CST  Subject: Medication Management     ** Submitted: **  Order:celecoxib (celecoxib 100 mg oral capsule)  1 cap(s)  Oral  bid  Qty:  180 cap(s)        Days Supply:  90        Refills:  0          Substitutions Allowed     PRN  AS NEEDED FOR PAIN      Route To Pharmacy - [email or web address] #[id number]    Signed by Tori Arevalo MA  2/22/2022 3:51:00 PM [facility]    ** Submitted: **  Complete:celecoxib (CeleBREX 100 mg oral capsule)   Signed by Tori Arevalo MA  2/22/2022 3:51:00 PM [facility]    ** Not Approved:  **  celecoxib (CELECOXIB 100MG CAPSULES)  TAKE 1 CAPSULE BY MOUTH TWICE DAILY AS NEEDED FOR PAIN  Qty:  180 cap(s)        Days Supply:  90        Refills:  0          Substitutions Allowed     Route To Pharmacy - [email or web address] #[id number]   Note from Pharmacy:  **Patient requests 90 days supply**  Signed by Tori Arevalo MA            ------------------------------------------  From: [email or web address] #[id number]  To: Demond Tuttle MD  Sent: February 17, 2022 10:15:09 AM CST  Subject: Medication Management  Due: February 16, 2022 12:00:13 AM CST     ** On Hold Pending Signature **     Dispensed Drug: celecoxib (celecoxib 100 mg oral capsule), TAKE 1 CAPSULE BY MOUTH TWICE DAILY AS NEEDED FOR PAIN  Quantity: 180 cap(s)  Days Supply: 90  Refills: 0  Substitutions Allowed  Notes from Pharmacy: **Patient requests 90 days supply**  ------------------------------------------

## 2022-03-02 NOTE — NURSING NOTE
Comprehensive Intake Entered On:  2/17/2022 9:41 AM CST    Performed On:  2/17/2022 9:35 AM CST by Lilian Hinson CMA               Summary   Chief Complaint :   HTN f/u and refills. Colonoscopy due this year. Has been taking 10mg lisinopril daily for a couple weeks d/t elevated home bps.    Weight Measured :   194 lb(Converted to: 194 lb 0 oz, 87.997 kg)    Systolic Blood Pressure :   147 mmHg (HI)    Diastolic Blood Pressure :   82 mmHg (HI)    Mean Arterial Pressure :   104 mmHg   Peripheral Pulse Rate :   75 bpm   BP Site :   Right arm   Pulse Site :   Radial artery   BP Method :   Electronic   HR Method :   Electronic   Lilian Hinson CMA - 2/17/2022 9:35 AM CST   Health Status   Allergies Verified? :   Yes   Medication History Verified? :   Yes   Pre-Visit Planning Status :   Completed   Lilian Hinson CMA - 2/17/2022 9:35 AM CST   Meds / Allergies   (As Of: 2/17/2022 9:41:47 AM CST)   Allergies (Active)   No Known Medication Allergies  Estimated Onset Date:   Unspecified ; Created By:   Jesika Hale CMA; Reaction Status:   Active ; Category:   Drug ; Substance:   No Known Medication Allergies ; Type:   Allergy ; Updated By:   Jesika Hale CMA; Reviewed Date:   10/26/2021 10:51 AM CDT        Medication List   (As Of: 2/17/2022 9:41:47 AM CST)   Prescription/Discharge Order    lisinopril  :   lisinopril ; Status:   Prescribed ; Ordered As Mnemonic:   lisinopril 5 mg oral tablet ; Simple Display Line:   1 tab(s), Oral, daily, 30 tab(s), 0 Refill(s) ; Ordering Provider:   Demond Tuttle MD; Catalog Code:   lisinopril ; Order Dt/Tm:   2/7/2022 8:29:39 AM CST            Home Meds    brimonidine-brinzolamide ophthalmic  :   brimonidine-brinzolamide ophthalmic ; Status:   Completed ; Ordered As Mnemonic:   Simbrinza 1%- 0.2% ophthalmic suspension ; Simple Display Line:   1 drop(s), Eye-Left, bid ; Catalog Code:   brimonidine-brinzolamide ophthalmic ; Order Dt/Tm:   4/27/2021 10:48:38 AM CDT           calcium citrate  :   calcium citrate ; Status:   Documented ; Ordered As Mnemonic:   calcium (as calcium citrate) 250 mg oral tablet ; Simple Display Line:   250 mg, 1 tab(s), Oral, daily, 0 Refill(s) ; Catalog Code:   calcium citrate ; Order Dt/Tm:   12/6/2019 2:22:31 PM CST          dexamethasone-tobramycin ophthalmic  :   dexamethasone-tobramycin ophthalmic ; Status:   Completed ; Ordered As Mnemonic:   Tobradex 0.3%-0.1% ophthalmic ointment ; Simple Display Line:   3 gm, 0 Refill(s) ; Catalog Code:   dexamethasone-tobramycin ophthalmic ; Order Dt/Tm:   10/26/2021 10:54:01 AM CDT ; Comment:   Responsible Provider: Chelsea James          ergocalciferol  :   ergocalciferol ; Status:   Documented ; Ordered As Mnemonic:   Vitamin D ; Simple Display Line:   1,000 unit(s), PO ; Catalog Code:   ergocalciferol ; Order Dt/Tm:   2/9/2010 6:07:01 PM CST          flax  :   flax ; Status:   Documented ; Ordered As Mnemonic:   Flax Seed Oil ; Catalog Code:   flax ; Order Dt/Tm:   2/9/2010 6:06:30 PM CST          latanoprost-netarsudil ophthalmic  :   latanoprost-netarsudil ophthalmic ; Status:   Completed ; Ordered As Mnemonic:   Rocklatan 0.02%-0.005% ophthalmic solution ; Simple Display Line:   1 drop(s), Eye-Left, qpm, 0 Refill(s) ; Catalog Code:   latanoprost-netarsudil ophthalmic ; Order Dt/Tm:   4/27/2021 10:48:38 AM CDT          lutein  :   lutein ; Status:   Documented ; Ordered As Mnemonic:   Lutein ; Simple Display Line:   PO, Daily ; Catalog Code:   lutein ; Order Dt/Tm:   2/9/2010 6:07:42 PM CST          multivitamin  :   multivitamin ; Status:   Documented ; Ordered As Mnemonic:   Daily Multiple Vitamins ; Simple Display Line:   PO, Daily ; Catalog Code:   multivitamin ; Order Dt/Tm:   1/28/2010 1:48:22 PM CST          omega-3 polyunsaturated fatty acids  :   omega-3 polyunsaturated fatty acids ; Status:   Documented ; Ordered As Mnemonic:   Fish Oil oral capsule ; Catalog Code:   omega-3 polyunsaturated  fatty acids ; Order Dt/Tm:   2/9/2010 6:06:06 PM CST            Social History   Social History   (As Of: 2/17/2022 9:41:47 AM CST)   Alcohol:  Current      (Last Updated: 4/23/2010 11:08:37 AM CDT by Lucia Robertson CMA )         Tobacco:        Never (less than 100 in lifetime)   (Last Updated: 1/8/2021 10:47:12 AM CST by Jesika Hale CMA)   Never (less than 100 in lifetime)   (Last Updated: 2/17/2022 9:35:53 AM CST by Lilian Hinson CMA)          Electronic Cigarette/Vaping:        Electronic Cigarette Use: Never.   (Last Updated: 1/8/2021 10:47:21 AM CST by Jesika Hale CMA)   Electronic Cigarette Use: Never.   (Last Updated: 2/17/2022 9:35:58 AM CST by Lilian Hinson CMA)          Exercise:        Exercise frequency: Active.   (Last Updated: 12/6/2011 1:00:21 PM CST by Jayla Sepulveda)

## 2022-03-02 NOTE — LETTER
(Inserted Image. Unable to display)            February 18, 2022        SVETLANA MARCOS      [address] [place] [address]        Dear SVETLANA,    Thank you for selecting [facility]  for your healthcare needs.  Below you will find the results of the recent tests done at our clinic.     All labs acceptable.      Result Name Current Result Reference Range   Sed Rate  2 2/17/2022  - < OR = 20   C-Reactive Protein (CRP) (mg/L)  0.8 2/17/2022  - <8.0       Please contact me or my assistant at [phone] if you have any questions.     Sincerely,        Demond Tuttle MD        What do your labs mean?  Below is a glossary of commonly ordered labs:  LDL   Bad Cholesterol   HDL   Good Cholesterol  AST/ALT   Liver Function   Cr/Creatinine   Kidney Function  Microalbumin   Kidney Function  BUN   Kidney Function  PSA   Prostate    TSH   Thyroid Hormone  HgbA1c   Diabetes Test   Hgb (Hemoglobin)   Red Blood Cells

## 2022-03-02 NOTE — TELEPHONE ENCOUNTER
---------------------  From: Nessa Balderas RN (Phone Messages Pool ([id number]_[facility]))   Sent: 2/7/2022 8:32:47 AM CST  Subject: Lisinopril refill       PCP:  MICHELE      Time of Call:  8:26am       Person Calling:  pt  Phone number:      Note:   Pt called in, requesting refill of Lisinopril. Pt asking if he is due to HTN visit?  Chart reviewed; pt is due for HTN check. Filled 30 day supply of Lisinopril. Pt asking if dose can be increased as his BP is creeping up; advised writer is not able to adjust dose; advised pt is due for visit and will address dosing at the visit; pt transferred to scheduling.    Last office visit and reason:  10/26/21 pre-opTFS

## 2022-03-08 ENCOUNTER — TELEPHONE (OUTPATIENT)
Dept: FAMILY MEDICINE | Facility: CLINIC | Age: 67
End: 2022-03-08
Payer: COMMERCIAL

## 2022-03-08 DIAGNOSIS — R19.5 POSITIVE COLORECTAL CANCER SCREENING USING COLOGUARD TEST: Primary | ICD-10-CM

## 2022-03-08 NOTE — TELEPHONE ENCOUNTER
Order form filled and given to TFS to sign. The will route to TC to fax to [facility]. They will call pt to schedule.

## 2022-03-08 NOTE — TELEPHONE ENCOUNTER
Reason for Call:  Colonoscopy Referral    Detailed comments: Patient called stating that he would like his referral for his colonoscopy sent to [facility].     Phone Number Patient can be reached at: Cell number on file:    Telephone Information:   Mobile [phone]       Best Time: anytime    Can we leave a detailed message on this number? YES    Call taken on 3/8/2022 at 12:28 PM by Yael Ruff

## 2022-03-23 ENCOUNTER — TELEPHONE (OUTPATIENT)
Dept: FAMILY MEDICINE | Facility: CLINIC | Age: 67
End: 2022-03-23
Payer: COMMERCIAL

## 2022-03-23 NOTE — TELEPHONE ENCOUNTER
Reason for Call: Request for an order or referral:    Order or referral being requested: Colonoscopy    Date needed: as soon as possible    Has the patient been seen by the PCP for this problem? YES    Additional comments: Patient calling asking about colonoscopy referral as he has not heard anything about scheduling. Would like referral to be sent to [facility]    Phone number Patient can be reached at:  Cell number on file:    Telephone Information:   Mobile [phone]       Best Time:  anytime    Can we leave a detailed message on this number?  YES    Call taken on 3/23/2022 at 10:35 AM by Yael Ruff

## 2022-03-23 NOTE — TELEPHONE ENCOUNTER
MILA with pt. I clarified with referral dept and order faxed to [facility] yesterday.

## 2022-04-03 ENCOUNTER — HEALTH MAINTENANCE LETTER (OUTPATIENT)
Age: 67
End: 2022-04-03

## 2022-05-04 ENCOUNTER — TELEPHONE (OUTPATIENT)
Dept: FAMILY MEDICINE | Facility: CLINIC | Age: 67
End: 2022-05-04
Payer: COMMERCIAL
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
## 2022-05-04 RX ORDER — DOXYCYCLINE 100 MG/1
TABLET ORAL
Qty: 10 TABLET | Refills: 1 | Status: SHIPPED | OUTPATIENT
Start: 2022-05-04 | End: 2023-01-11

## 2022-05-04 NOTE — TELEPHONE ENCOUNTER
Reason for Call:  Deer Tick    Detailed comments: Patient pulled off deer tick today.Patient is wondering about if testing should be done or if he should just watch the skin area. Please advise.     Phone Number Patient can be reached at: Cell number on file:    Telephone Information:   Mobile [phone]       Best Time: anytime    Can we leave a detailed message on this number? YES    Call taken on 5/4/2022 at 3:31 PM by Yael Ruff

## 2022-05-04 NOTE — TELEPHONE ENCOUNTER
He should take doxycycline mono 100 mg 2 tabs now and 2 tabs whenever he pulled a tick off himself.  Dispense #20 instructions to say take 2 tabs p.o. as needed tick bite

## 2022-05-05 ENCOUNTER — TELEPHONE (OUTPATIENT)
Dept: FAMILY MEDICINE | Facility: CLINIC | Age: 67
End: 2022-05-05
Payer: COMMERCIAL

## 2022-05-05 NOTE — TELEPHONE ENCOUNTER
Reason for Call:  Other prescription    Detailed comments: pt wanting to speak w/nurse about medication/pt also has colonoscopy on Mon and inquiring into this as well    Phone Number Patient can be reached at: Cell number on file:    Telephone Information:   Mobile [phone]       Best Time: anytime    Can we leave a detailed message on this number? YES    Call taken on 5/5/2022 at 10:06 AM by Keena Cabrera

## 2022-05-05 NOTE — TELEPHONE ENCOUNTER
Patient received a tick bite and antibiotics were sent to pharmacy.    1. When to start ANTIBIOTIC?  He has a colonoscopy on Monday. Will antibiotic interfere with colonoscopy.

## 2022-08-02 PROBLEM — E78.5 ELEVATED LIPIDS: Status: ACTIVE | Noted: 2022-08-02

## 2022-08-02 PROBLEM — M47.816 SPONDYLOSIS OF LUMBAR SPINE: Status: ACTIVE | Noted: 2022-08-02

## 2022-08-02 PROBLEM — K40.20 BILATERAL INGUINAL HERNIA WITHOUT OBSTRUCTION OR GANGRENE, RECURRENCE NOT SPECIFIED: Status: RESOLVED | Noted: 2020-07-08 | Resolved: 2022-08-02

## 2022-08-02 PROBLEM — Z53.20 REFUSAL OF STATIN MEDICATION BY PATIENT: Status: ACTIVE | Noted: 2022-08-02

## 2022-08-02 RX ORDER — FOLIC ACID 1 MG/1
1 TABLET ORAL EVERY MORNING
COMMUNITY

## 2022-08-03 DIAGNOSIS — I10 BENIGN ESSENTIAL HYPERTENSION: Primary | ICD-10-CM

## 2022-08-04 DIAGNOSIS — I10 BENIGN ESSENTIAL HYPERTENSION: ICD-10-CM

## 2022-08-04 RX ORDER — LISINOPRIL 10 MG/1
10 TABLET ORAL DAILY
Qty: 30 TABLET | Refills: 0 | Status: SHIPPED | OUTPATIENT
Start: 2022-08-04 | End: 2022-08-05

## 2022-08-04 RX ORDER — CELECOXIB 100 MG/1
1 CAPSULE ORAL 2 TIMES DAILY PRN
COMMUNITY
Start: 2022-02-22 | End: 2023-01-11 | Stop reason: SINTOL

## 2022-08-04 NOTE — TELEPHONE ENCOUNTER
Future Office Visit:   Next 5 appointments (look out 90 days)    Aug 05, 2022  9:20 AM  (Arrive by 9:00 AM)  Provider Visit with Demond Tuttle MD  [facility] ([facility] ) [address] [address] [phone]                   BP Readings from Last 3 Encounters:   02/17/22 (!) 147/82   10/26/21 138/81   08/03/21 100/65     Potassium   Date Value Ref Range Status   02/17/2022 5.5 (H) 3.5 - 5.3 mmol/L Final     Comment:     Lab test performed by:  Lab Mnemonic:TIERRA  [facility]  [address] [place] [address]  Skyler Love M.D.  [facility] Specimen received date and time: 18-FEB-2022 03:19:00.00

## 2022-08-05 ENCOUNTER — OFFICE VISIT (OUTPATIENT)
Dept: FAMILY MEDICINE | Facility: CLINIC | Age: 67
End: 2022-08-05
Payer: MEDICARE

## 2022-08-05 VITALS
HEART RATE: 68 BPM | TEMPERATURE: 97.5 F | BODY MASS INDEX: 23.5 KG/M2 | SYSTOLIC BLOOD PRESSURE: 108 MMHG | HEIGHT: 75 IN | WEIGHT: 189 LBS | DIASTOLIC BLOOD PRESSURE: 68 MMHG

## 2022-08-05 DIAGNOSIS — Z53.20 REFUSAL OF STATIN MEDICATION BY PATIENT: ICD-10-CM

## 2022-08-05 DIAGNOSIS — E78.5 ELEVATED LIPIDS: ICD-10-CM

## 2022-08-05 DIAGNOSIS — I10 BENIGN ESSENTIAL HYPERTENSION: Primary | ICD-10-CM

## 2022-08-05 DIAGNOSIS — H40.003 GLAUCOMA SUSPECT, BILATERAL: ICD-10-CM

## 2022-08-05 LAB
ANION GAP SERPL CALCULATED.3IONS-SCNC: 10 MMOL/L (ref 7–15)
BUN SERPL-MCNC: 20.2 MG/DL (ref 8–23)
CALCIUM SERPL-MCNC: 9.5 MG/DL (ref 8.8–10.2)
CHLORIDE SERPL-SCNC: 105 MMOL/L (ref 98–107)
CREAT SERPL-MCNC: 1.06 MG/DL (ref 0.67–1.17)
DEPRECATED HCO3 PLAS-SCNC: 25 MMOL/L (ref 22–29)
GFR SERPL CREATININE-BSD FRML MDRD: 77 ML/MIN/1.73M2
GLUCOSE SERPL-MCNC: 104 MG/DL (ref 70–99)
POTASSIUM SERPL-SCNC: 4.6 MMOL/L (ref 3.4–5.3)
SODIUM SERPL-SCNC: 140 MMOL/L (ref 136–145)

## 2022-08-05 PROCEDURE — 36415 COLL VENOUS BLD VENIPUNCTURE: CPT | Performed by: FAMILY MEDICINE

## 2022-08-05 PROCEDURE — 99214 OFFICE O/P EST MOD 30 MIN: CPT | Performed by: FAMILY MEDICINE

## 2022-08-05 PROCEDURE — 80048 BASIC METABOLIC PNL TOTAL CA: CPT | Performed by: FAMILY MEDICINE

## 2022-08-05 RX ORDER — AMOXICILLIN 500 MG/1
CAPSULE ORAL
COMMUNITY
Start: 2021-12-14

## 2022-08-05 RX ORDER — LISINOPRIL 10 MG/1
15 TABLET ORAL 2 TIMES DAILY
Qty: 180 TABLET | Refills: 1 | Status: SHIPPED | OUTPATIENT
Start: 2022-08-05 | End: 2023-06-09

## 2022-08-05 RX ORDER — LISINOPRIL 10 MG/1
TABLET ORAL
Qty: 90 TABLET | OUTPATIENT
Start: 2022-08-05

## 2022-08-05 RX ORDER — BRINZOLAMIDE/BRIMONIDINE TARTRATE 10; 2 MG/ML; MG/ML
SUSPENSION/ DROPS OPHTHALMIC
COMMUNITY
Start: 2022-05-27

## 2022-08-05 NOTE — LETTER
August 5, 2022      Kar AZEVEDO Rene  [address] [place] [address]        Dear ,    We are writing to inform you of your test results.    Your test results fall within the expected range(s) or remain unchanged from previous results.  Please continue with current treatment plan.    Resulted Orders   Basic metabolic panel   Result Value Ref Range    Creatinine 1.06 0.67 - 1.17 mg/dL    Sodium 140 136 - 145 mmol/L    Potassium 4.6 3.4 - 5.3 mmol/L    Urea Nitrogen 20.2 8.0 - 23.0 mg/dL    Chloride 105 98 - 107 mmol/L    Carbon Dioxide (CO2) 25 22 - 29 mmol/L    Anion Gap 10 7 - 15 mmol/L    Glucose 104 (H) 70 - 99 mg/dL    GFR Estimate 77 >60 mL/min/1.73m2      Comment:      Effective December 21, 2021 eGFRcr in adults is calculated using the 2021 CKD-EPI creatinine equation which includes age and gender (Francesca et al., NEJM, DOI: 10.1056/USFFij8366764)    Calcium 9.5 8.8 - 10.2 mg/dL       If you have any questions or concerns, please call the clinic at the number listed above.       Sincerely,      Demond Tuttle MD

## 2022-08-05 NOTE — PROGRESS NOTES
Assessment & Plan     Benign essential hypertension  Under excellent control blood pressures at home sometimes are 90 systolic we will back him down to 50 mg lisinopril day recheck potassium was elevated last time check his blood pressures he is back in 6 months  - Basic metabolic panel  - lisinopril (ZESTRIL) 10 MG tablet; Take 1.5 tablets (15 mg) by mouth 2 times daily    Elevated lipids  Under fair control with patient declined statins in the past    Glaucoma suspect, bilateral  Procedure still struggling    Refusal of statin medication by patient  As above    Return in about 6 months (around 2/5/2023) for Follow up.    Demond Tuttle MD  [facility] [place]    Alessandra Lakhani is a 66 year old, presenting for the following health issues: Overall patient has been doing well still struggles with his glaucoma the multiple procedures is on eyedrops followed closely by ophthalmology.  Overall bones and joints have been okay.  He is not taking Celebrex at this time.  Continues to live independently with his girlfriend Dottie of many years.  She is retired RN.  Hypertension and Musculoskeletal Problem      History of Present Illness       Back Pain:  He presents for follow up of back pain. Patient's back pain is a chronic problem.  Location of back pain:  Right lower back and left lower back  Description of back pain: cramping and dull ache  Back pain spreads: right buttocks    Since patient first noticed back pain, pain is: always present, but gets better and worse  Does back pain interfere with his job:  Not applicable      Hypertension: He presents for follow up of hypertension.  He does check blood pressure  regularly outside of the clinic. Outpatient blood pressures have not been over 140/90. He follows a low salt diet.       Stopped Celebrex d/t SE - leg went numb.     Discuss using prednisone again for back pain.     Patient Active Problem List   Diagnosis     Benign essential  "hypertension     Chronic low back pain     Elevated lipids     Spondylosis of lumbar spine     Refusal of statin medication by patient     Glaucoma suspect, bilateral     Current Outpatient Medications   Medication     amoxicillin (AMOXIL) 500 MG capsule     celecoxib (CELEBREX) 100 MG capsule     cholecalciferol, vitamin D3, (VITAMIN D3) 25 mcg (1,000 unit) capsule     doxycycline monohydrate (ADOXA) 100 MG tablet     fish oil-omega-3 fatty acids (FISH OIL) 300-1,000 mg capsule     folic acid (FOLVITE) 1 MG tablet     lisinopril (ZESTRIL) 10 MG tablet     LUTEIN PO     Multiple Vitamin (MULTIVITAMIN ADULT PO)     Flaxseed, Linseed, (FLAXSEED OIL PO)     ibuprofen (ADVIL,MOTRIN) 200 MG tablet     SIMBRINZA 1-0.2 % ophthalmic suspension     No current facility-administered medications for this visit.     Past Surgical History:   Procedure Laterality Date     COLONOSCOPY  09/29/2004, 06/28/2012     INGUINAL HERNIA REPAIR  07/17/2020     JOINT REPLACEMENT Right 01/01/1995     VA REVISE TOTAL HIP REPLACEMENT Right     1987     ZZC REVISE TOTAL HIP REPLACEMENT Left     1995         Review of Systems   Constitutional, HEENT, cardiovascular, pulmonary, GI, , musculoskeletal, neuro, skin, endocrine and psych systems are negative, except as otherwise noted.      Objective    /68 (BP Location: Right arm, Patient Position: Sitting, Cuff Size: Adult Large)   Pulse 68   Temp 97.5  F (36.4  C) (Tympanic)   Ht 1.905 m (6' 3\")   Wt 85.7 kg (189 lb)   BMI 23.62 kg/m    Body mass index is 23.62 kg/m .  Physical Exam   GENERAL: healthy, alert and no distress  NECK: no adenopathy, no asymmetry, masses, or scars and thyroid normal to palpation  RESP: lungs clear to auscultation - no rales, rhonchi or wheezes  CV: regular rate and rhythm, normal S1 S2, no S3 or S4, no murmur, click or rub, no peripheral edema and peripheral pulses strong  ABDOMEN: soft, nontender, no hepatosplenomegaly, no masses and bowel sounds normal  MS: " no gross musculoskeletal defects noted, no edema                    .  ..

## 2022-09-06 ENCOUNTER — TELEPHONE (OUTPATIENT)
Dept: FAMILY MEDICINE | Facility: CLINIC | Age: 67
End: 2022-09-06

## 2022-09-06 NOTE — TELEPHONE ENCOUNTER
Refill request denied.  Medication not on active med list.  Prednisone taper for acute management and will need appointment.

## 2022-09-06 NOTE — TELEPHONE ENCOUNTER
Fax received for refill of prednisone 10 mg tabs.  Directions 4 tabs daily x 3 days, 3 tablets daily x 3 days, 2 tablets daily x 3 days, then 1 tablet daily x 3 days.    Per fax last refill 2/17/22.   Not on med list.

## 2022-09-08 ENCOUNTER — TRANSFERRED RECORDS (OUTPATIENT)
Dept: HEALTH INFORMATION MANAGEMENT | Facility: CLINIC | Age: 67
End: 2022-09-08

## 2022-09-08 NOTE — TELEPHONE ENCOUNTER
Pt calling back to f/u on Rx request.  Pt stated that bursitis and/or pain was discussed in 8/5 appt.  Rx was never sent in though, so unsure if was completely addressed.  Please advise.    Patient ph: [phone]

## 2022-09-09 DIAGNOSIS — M47.816 SPONDYLOSIS OF LUMBAR SPINE: Primary | ICD-10-CM

## 2022-09-09 RX ORDER — PREDNISONE 10 MG/1
TABLET ORAL
Qty: 30 TABLET | Refills: 0 | Status: SHIPPED | OUTPATIENT
Start: 2022-09-09 | End: 2023-03-28

## 2022-10-03 ENCOUNTER — HEALTH MAINTENANCE LETTER (OUTPATIENT)
Age: 67
End: 2022-10-03

## 2022-12-15 ENCOUNTER — TRANSFERRED RECORDS (OUTPATIENT)
Dept: HEALTH INFORMATION MANAGEMENT | Facility: CLINIC | Age: 67
End: 2022-12-15

## 2023-01-11 ENCOUNTER — OFFICE VISIT (OUTPATIENT)
Dept: FAMILY MEDICINE | Facility: CLINIC | Age: 68
End: 2023-01-11
Payer: MEDICARE

## 2023-01-11 VITALS
WEIGHT: 191.7 LBS | TEMPERATURE: 96.5 F | HEART RATE: 75 BPM | BODY MASS INDEX: 23.83 KG/M2 | SYSTOLIC BLOOD PRESSURE: 148 MMHG | RESPIRATION RATE: 12 BRPM | HEIGHT: 75 IN | DIASTOLIC BLOOD PRESSURE: 81 MMHG

## 2023-01-11 DIAGNOSIS — G89.29 CHRONIC PAIN OF LEFT KNEE: Primary | ICD-10-CM

## 2023-01-11 DIAGNOSIS — M25.562 CHRONIC PAIN OF LEFT KNEE: Primary | ICD-10-CM

## 2023-01-11 PROCEDURE — 99213 OFFICE O/P EST LOW 20 MIN: CPT | Performed by: FAMILY MEDICINE

## 2023-01-11 NOTE — PROGRESS NOTES
"  Assessment & Plan     Chronic pain of left knee  Patient has recently needed.  That has not responded to physical therapy and injection.  Fairly recent plain film showing arthritis.  Proceed with MRI of the knee and follow-up pending results.  Continue with ibuprofen as needed for pain  - MR Knee Right w/o Contrast; Future                 No follow-ups on file.    Eren Tavera MD  [facility]    Alessandra Lakhani is a 67 year old accompanied by his self, presenting for the following health issues:  Musculoskeletal Problem (Left knee/thigh pain 1 month ago, no injury.  C/o having no flexibility.  Is finding doing exercises to strengthen leg/knee harder to do.)      History of Present Illness       Reason for visit:  Left knee pain  Symptoms include:  Weakness pain stiffness  Symptom intensity:  Moderate  Symptom progression:  Staying the same  Had these symptoms before:  No  What makes it worse:  Sitting to long  What makes it better:  Advil , massage , heat , coldHe consumes 1 sweetened beverage(s) daily. He exercises with enough effort to increase his heart rate 6 days per week.   He is taking medications regularly.     Patient has a fairly sudden onset of left knee pain over the last 4 to 6 weeks.  He was seen by orthopedics about 3 weeks ago.  Degenerative arthritis was found on plain films.  He received an injection and has not had much improvement.  He notes decreased range of motion.        Review of Systems         Objective    BP (!) 148/81 (BP Location: Right arm, Patient Position: Sitting, Cuff Size: Adult Large)   Pulse 75   Temp (!) 96.5  F (35.8  C) (Tympanic)   Resp 12   Ht 1.905 m (6' 3\")   Wt 87 kg (191 lb 11.2 oz)   BMI 23.96 kg/m    Body mass index is 23.96 kg/m .  Physical Exam   Alert, oriented, no acute distress  Normal heart rate  Nonlabored breathing  Exam of the left knee reveals mild edema.  He has somewhat limited active and passive range of motion " at the extremes.  No instability.  Medial joint line tenderness, posterior tenderness

## 2023-01-13 ENCOUNTER — HOSPITAL ENCOUNTER (OUTPATIENT)
Dept: MRI IMAGING | Facility: CLINIC | Age: 68
Discharge: HOME | End: 2023-01-13
Attending: FAMILY MEDICINE | Admitting: FAMILY MEDICINE
Payer: MEDICARE

## 2023-01-13 DIAGNOSIS — M25.562 CHRONIC PAIN OF LEFT KNEE: ICD-10-CM

## 2023-01-13 DIAGNOSIS — G89.29 CHRONIC PAIN OF LEFT KNEE: ICD-10-CM

## 2023-01-13 PROCEDURE — G1010 CDSM STANSON: HCPCS

## 2023-01-17 ENCOUNTER — TRANSFERRED RECORDS (OUTPATIENT)
Dept: HEALTH INFORMATION MANAGEMENT | Facility: CLINIC | Age: 68
End: 2023-01-17
Payer: MEDICARE

## 2023-03-28 ENCOUNTER — OFFICE VISIT (OUTPATIENT)
Dept: FAMILY MEDICINE | Facility: CLINIC | Age: 68
End: 2023-03-28
Payer: MEDICARE

## 2023-03-28 VITALS
BODY MASS INDEX: 23.87 KG/M2 | DIASTOLIC BLOOD PRESSURE: 78 MMHG | TEMPERATURE: 97.1 F | SYSTOLIC BLOOD PRESSURE: 126 MMHG | WEIGHT: 191 LBS | HEART RATE: 72 BPM

## 2023-03-28 DIAGNOSIS — H40.003 GLAUCOMA SUSPECT, BILATERAL: ICD-10-CM

## 2023-03-28 DIAGNOSIS — M54.50 CHRONIC LOW BACK PAIN, UNSPECIFIED BACK PAIN LATERALITY, UNSPECIFIED WHETHER SCIATICA PRESENT: ICD-10-CM

## 2023-03-28 DIAGNOSIS — I10 BENIGN ESSENTIAL HYPERTENSION: Primary | ICD-10-CM

## 2023-03-28 DIAGNOSIS — M25.562 CHRONIC PAIN OF LEFT KNEE: ICD-10-CM

## 2023-03-28 DIAGNOSIS — G89.29 CHRONIC LOW BACK PAIN, UNSPECIFIED BACK PAIN LATERALITY, UNSPECIFIED WHETHER SCIATICA PRESENT: ICD-10-CM

## 2023-03-28 DIAGNOSIS — G89.29 CHRONIC PAIN OF LEFT KNEE: ICD-10-CM

## 2023-03-28 DIAGNOSIS — M47.816 SPONDYLOSIS OF LUMBAR SPINE: ICD-10-CM

## 2023-03-28 DIAGNOSIS — E78.5 ELEVATED LIPIDS: ICD-10-CM

## 2023-03-28 DIAGNOSIS — Z12.5 SCREENING FOR PROSTATE CANCER: ICD-10-CM

## 2023-03-28 LAB
BASOPHILS # BLD AUTO: 0 10E3/UL (ref 0–0.2)
BASOPHILS NFR BLD AUTO: 1 %
EOSINOPHIL # BLD AUTO: 0.1 10E3/UL (ref 0–0.7)
EOSINOPHIL NFR BLD AUTO: 1 %
ERYTHROCYTE [DISTWIDTH] IN BLOOD BY AUTOMATED COUNT: 12.5 % (ref 10–15)
HCT VFR BLD AUTO: 46.6 % (ref 40–53)
HGB BLD-MCNC: 15.4 G/DL (ref 13.3–17.7)
IMM GRANULOCYTES # BLD: 0 10E3/UL
IMM GRANULOCYTES NFR BLD: 0 %
LYMPHOCYTES # BLD AUTO: 3.3 10E3/UL (ref 0.8–5.3)
LYMPHOCYTES NFR BLD AUTO: 40 %
MCH RBC QN AUTO: 30.6 PG (ref 26.5–33)
MCHC RBC AUTO-ENTMCNC: 33 G/DL (ref 31.5–36.5)
MCV RBC AUTO: 93 FL (ref 78–100)
MONOCYTES # BLD AUTO: 0.7 10E3/UL (ref 0–1.3)
MONOCYTES NFR BLD AUTO: 9 %
NEUTROPHILS # BLD AUTO: 4.1 10E3/UL (ref 1.6–8.3)
NEUTROPHILS NFR BLD AUTO: 49 %
PLATELET # BLD AUTO: 336 10E3/UL (ref 150–450)
RBC # BLD AUTO: 5.03 10E6/UL (ref 4.4–5.9)
WBC # BLD AUTO: 8.3 10E3/UL (ref 4–11)

## 2023-03-28 PROCEDURE — 36415 COLL VENOUS BLD VENIPUNCTURE: CPT | Performed by: FAMILY MEDICINE

## 2023-03-28 PROCEDURE — G0103 PSA SCREENING: HCPCS | Performed by: FAMILY MEDICINE

## 2023-03-28 PROCEDURE — 99214 OFFICE O/P EST MOD 30 MIN: CPT | Performed by: FAMILY MEDICINE

## 2023-03-28 PROCEDURE — 80048 BASIC METABOLIC PNL TOTAL CA: CPT | Performed by: FAMILY MEDICINE

## 2023-03-28 PROCEDURE — 80061 LIPID PANEL: CPT | Performed by: FAMILY MEDICINE

## 2023-03-28 PROCEDURE — 85025 COMPLETE CBC W/AUTO DIFF WBC: CPT | Mod: QW | Performed by: FAMILY MEDICINE

## 2023-03-28 RX ORDER — ACETAZOLAMIDE 250 MG/1
250 TABLET ORAL DAILY PRN
COMMUNITY
End: 2023-10-03

## 2023-03-28 RX ORDER — VIT C/B6/B5/MAGNESIUM/HERB 173 50-5-6-5MG
1000 CAPSULE ORAL DAILY
COMMUNITY

## 2023-03-28 RX ORDER — PREDNISONE 10 MG/1
TABLET ORAL
Qty: 30 TABLET | Refills: 0 | Status: SHIPPED | OUTPATIENT
Start: 2023-03-28 | End: 2023-10-03

## 2023-03-28 RX ORDER — NIACINAMIDE 500 MG
500 TABLET ORAL DAILY
COMMUNITY

## 2023-03-28 RX ORDER — DORZOLAMIDE HCL 20 MG/ML
SOLUTION/ DROPS OPHTHALMIC
COMMUNITY
Start: 2023-02-09 | End: 2023-03-28

## 2023-03-28 NOTE — LETTER
March 29, 2023        Kar AZEVEDO Rene  [address] [place] [address]        Dear ,    We are writing to inform you of your test results.    Test results indicate you may require additional follow up, see comment below.  Your cholesterol remains elevated.  We will discuss treatment options again on your next visit.    Resulted Orders   Basic metabolic panel   Result Value Ref Range    Sodium 140 136 - 145 mmol/L    Potassium 5.0 3.4 - 5.3 mmol/L    Chloride 103 98 - 107 mmol/L    Carbon Dioxide (CO2) 25 22 - 29 mmol/L    Anion Gap 12 7 - 15 mmol/L    Urea Nitrogen 26.0 (H) 8.0 - 23.0 mg/dL    Creatinine 0.98 0.67 - 1.17 mg/dL    Calcium 10.0 8.8 - 10.2 mg/dL    Glucose 92 70 - 99 mg/dL    GFR Estimate 84 >60 mL/min/1.73m2      Comment:      eGFR calculated using 2021 CKD-EPI equation.   Lipid panel reflex to direct LDL Fasting   Result Value Ref Range    Cholesterol 249 (H) <200 mg/dL    Triglycerides 59 <150 mg/dL    Direct Measure HDL 77 >=40 mg/dL    LDL Cholesterol Calculated 160 (H) <=100 mg/dL    Non HDL Cholesterol 172 (H) <130 mg/dL    Narrative    Cholesterol  Desirable:  <200 mg/dL    Triglycerides  Normal:  Less than 150 mg/dL  Borderline High:  150-199 mg/dL  High:  200-499 mg/dL  Very High:  Greater than or equal to 500 mg/dL    Direct Measure HDL  Female:  Greater than or equal to 50 mg/dL   Male:  Greater than or equal to 40 mg/dL    LDL Cholesterol  Desirable:  <100mg/dL  Above Desirable:  100-129 mg/dL   Borderline High:  130-159 mg/dL   High:  160-189 mg/dL   Very High:  >= 190 mg/dL    Non HDL Cholesterol  Desirable:  130 mg/dL  Above Desirable:  130-159 mg/dL  Borderline High:  160-189 mg/dL  High:  190-219 mg/dL  Very High:  Greater than or equal to 220 mg/dL   PSA, screen   Result Value Ref Range    Prostate Specific Antigen Screen 1.05 0.00 - 4.50 ng/mL    Narrative    This result is obtained using the Roche Elecsys total PSA method on the deya e801 immunoassay analyzer.  Results obtained with different assay methods or kits cannot be used interchangeably.   CBC with platelets and differential   Result Value Ref Range    WBC Count 8.3 4.0 - 11.0 10e3/uL    RBC Count 5.03 4.40 - 5.90 10e6/uL    Hemoglobin 15.4 13.3 - 17.7 g/dL    Hematocrit 46.6 40.0 - 53.0 %    MCV 93 78 - 100 fL    MCH 30.6 26.5 - 33.0 pg    MCHC 33.0 31.5 - 36.5 g/dL    RDW 12.5 10.0 - 15.0 %    Platelet Count 336 150 - 450 10e3/uL    % Neutrophils 49 %    % Lymphocytes 40 %    % Monocytes 9 %    % Eosinophils 1 %    % Basophils 1 %    % Immature Granulocytes 0 %    Absolute Neutrophils 4.1 1.6 - 8.3 10e3/uL    Absolute Lymphocytes 3.3 0.8 - 5.3 10e3/uL    Absolute Monocytes 0.7 0.0 - 1.3 10e3/uL    Absolute Eosinophils 0.1 0.0 - 0.7 10e3/uL    Absolute Basophils 0.0 0.0 - 0.2 10e3/uL    Absolute Immature Granulocytes 0.0 <=0.4 10e3/uL       If you have any questions or concerns, please call the clinic at the number listed above.       Sincerely,      Demond Tuttle MD

## 2023-03-28 NOTE — PROGRESS NOTES
Assessment & Plan     Benign essential hypertension  Under good control with lisinopril  - CBC with Platelets & Differential  - Basic metabolic panel    Elevated lipids  Treated with diet  - Lipid panel reflex to direct LDL Fasting    Glaucoma suspect, bilateral  Followed by ophthalmology    Chronic low back pain, unspecified back pain laterality, unspecified whether sciatica present  With exacerbation we will give him a prednisone taper    Screening for prostate cancer    - PSA, screen    Chronic pain of left knee  Not ready for replacement    Spondylosis of lumbar spine  As above  - predniSONE (DELTASONE) 10 MG tablet; Take 4 tabs a day for 3 days and taper by 1 tab every 3 days                 Demond Tuttle MD  [facility]    Alessandra Lakhani is a 67 year old, presenting for the following health issues: Overall doing well he has been having more problems with his back he lately would like a short course of prednisone.  He does have his chronic left knee problems not ready for replacement yet.  Continues to live independently with his partner Dottie.  Patient continues to see ophthalmology for his glaucoma.  Hypertension    Additional Questions 3/28/2023   Roomed by Joya     History of Present Illness       Hypertension: He presents for follow up of hypertension.  He does check blood pressure  regularly outside of the clinic. Outpatient blood pressures have not been over 140/90. He does not follow a low salt diet.     He eats 4 or more servings of fruits and vegetables daily.He consumes 0 sweetened beverage(s) daily.He exercises with enough effort to increase his heart rate 30 to 60 minutes per day.  He exercises with enough effort to increase his heart rate 6 days per week.   He is taking medications regularly.       Discuss Celebrex, prednisone.          Patient Active Problem List   Diagnosis     Benign essential hypertension     Chronic low back pain     Elevated lipids      Spondylosis of lumbar spine     Refusal of statin medication by patient     Glaucoma suspect, bilateral     Current Outpatient Medications   Medication     acetaZOLAMIDE (DIAMOX) 250 MG tablet     amoxicillin (AMOXIL) 500 MG capsule     cholecalciferol, vitamin D3, (VITAMIN D3) 25 mcg (1,000 unit) capsule     DOXYCYCLINE PO     fish oil-omega-3 fatty acids (FISH OIL) 300-1,000 mg capsule     Flaxseed, Linseed, (FLAXSEED OIL PO)     folic acid (FOLVITE) 1 MG tablet     ibuprofen (ADVIL,MOTRIN) 200 MG tablet     lisinopril (ZESTRIL) 10 MG tablet     LUTEIN PO     Multiple Vitamin (MULTIVITAMIN ADULT PO)     niacinamide 500 MG tablet     predniSONE (DELTASONE) 10 MG tablet     SIMBRINZA 1-0.2 % ophthalmic suspension     Turmeric 500 MG CAPS     Zinc 25 MG TABS     No current facility-administered medications for this visit.           Review of Systems   Constitutional, HEENT, cardiovascular, pulmonary, gi and gu systems are negative, except as otherwise noted.      Objective    /78 (BP Location: Right arm, Patient Position: Sitting, Cuff Size: Adult Regular)   Pulse 72   Temp 97.1  F (36.2  C) (Temporal)   Wt 86.6 kg (191 lb)   BMI 23.87 kg/m    Body mass index is 23.87 kg/m .  Physical Exam   GENERAL: healthy, alert and no distress  NECK: no adenopathy, no asymmetry, masses, or scars and thyroid normal to palpation  RESP: lungs clear to auscultation - no rales, rhonchi or wheezes  CV: regular rate and rhythm, normal S1 S2, no S3 or S4, no murmur, click or rub, no peripheral edema and peripheral pulses strong  ABDOMEN: soft, nontender, no hepatosplenomegaly, no masses and bowel sounds normal  MS: no gross musculoskeletal defects noted, no edema    Office Visit on 08/05/2022   Component Date Value Ref Range Status     Creatinine 08/05/2022 1.06  0.67 - 1.17 mg/dL Final     Sodium 08/05/2022 140  136 - 145 mmol/L Final     Potassium 08/05/2022 4.6  3.4 - 5.3 mmol/L Final     Urea Nitrogen 08/05/2022 20.2  8.0 -  23.0 mg/dL Final     Chloride 08/05/2022 105  98 - 107 mmol/L Final     Carbon Dioxide (CO2) 08/05/2022 25  22 - 29 mmol/L Final     Anion Gap 08/05/2022 10  7 - 15 mmol/L Final     Glucose 08/05/2022 104 (H)  70 - 99 mg/dL Final     GFR Estimate 08/05/2022 77  >60 mL/min/1.73m2 Final    Effective December 21, 2021 eGFRcr in adults is calculated using the 2021 CKD-EPI creatinine equation which includes age and gender (Francesca et al., NEJM, DOI: 10.1056/QPZWum1130454)     Calcium 08/05/2022 9.5  8.8 - 10.2 mg/dL Final

## 2023-03-29 LAB
ANION GAP SERPL CALCULATED.3IONS-SCNC: 12 MMOL/L (ref 7–15)
BUN SERPL-MCNC: 26 MG/DL (ref 8–23)
CALCIUM SERPL-MCNC: 10 MG/DL (ref 8.8–10.2)
CHLORIDE SERPL-SCNC: 103 MMOL/L (ref 98–107)
CHOLEST SERPL-MCNC: 249 MG/DL
CREAT SERPL-MCNC: 0.98 MG/DL (ref 0.67–1.17)
DEPRECATED HCO3 PLAS-SCNC: 25 MMOL/L (ref 22–29)
GFR SERPL CREATININE-BSD FRML MDRD: 84 ML/MIN/1.73M2
GLUCOSE SERPL-MCNC: 92 MG/DL (ref 70–99)
HDLC SERPL-MCNC: 77 MG/DL
LDLC SERPL CALC-MCNC: 160 MG/DL
NONHDLC SERPL-MCNC: 172 MG/DL
POTASSIUM SERPL-SCNC: 5 MMOL/L (ref 3.4–5.3)
PSA SERPL DL<=0.01 NG/ML-MCNC: 1.05 NG/ML (ref 0–4.5)
SODIUM SERPL-SCNC: 140 MMOL/L (ref 136–145)
TRIGL SERPL-MCNC: 59 MG/DL

## 2023-04-04 ENCOUNTER — TRANSFERRED RECORDS (OUTPATIENT)
Dept: HEALTH INFORMATION MANAGEMENT | Facility: CLINIC | Age: 68
End: 2023-04-04
Payer: MEDICARE

## 2023-05-20 ENCOUNTER — HEALTH MAINTENANCE LETTER (OUTPATIENT)
Age: 68
End: 2023-05-20

## 2023-06-07 DIAGNOSIS — I10 BENIGN ESSENTIAL HYPERTENSION: ICD-10-CM

## 2023-06-09 RX ORDER — LISINOPRIL 10 MG/1
TABLET ORAL
Qty: 180 TABLET | Refills: 1 | Status: SHIPPED | OUTPATIENT
Start: 2023-06-09 | End: 2023-10-03

## 2023-06-09 NOTE — TELEPHONE ENCOUNTER
Prescription approved per [facility] Refill Protocol.    Last Written Prescription Date: 8/5/22  Last Fill Quantity: 180,  # refills: 1   Last office visit: 3/28/2023

## 2023-06-13 ENCOUNTER — TRANSFERRED RECORDS (OUTPATIENT)
Dept: HEALTH INFORMATION MANAGEMENT | Facility: CLINIC | Age: 68
End: 2023-06-13
Payer: MEDICARE

## 2023-10-03 ENCOUNTER — TRANSFERRED RECORDS (OUTPATIENT)
Dept: HEALTH INFORMATION MANAGEMENT | Facility: CLINIC | Age: 68
End: 2023-10-03

## 2023-10-03 ENCOUNTER — OFFICE VISIT (OUTPATIENT)
Dept: FAMILY MEDICINE | Facility: CLINIC | Age: 68
End: 2023-10-03
Payer: MEDICARE

## 2023-10-03 VITALS
WEIGHT: 186 LBS | OXYGEN SATURATION: 99 % | BODY MASS INDEX: 23.13 KG/M2 | TEMPERATURE: 97.7 F | SYSTOLIC BLOOD PRESSURE: 118 MMHG | DIASTOLIC BLOOD PRESSURE: 66 MMHG | HEART RATE: 68 BPM | RESPIRATION RATE: 16 BRPM | HEIGHT: 75 IN

## 2023-10-03 DIAGNOSIS — H40.003 GLAUCOMA SUSPECT, BILATERAL: ICD-10-CM

## 2023-10-03 DIAGNOSIS — G89.29 CHRONIC PAIN OF LEFT KNEE: ICD-10-CM

## 2023-10-03 DIAGNOSIS — M54.50 CHRONIC LOW BACK PAIN, UNSPECIFIED BACK PAIN LATERALITY, UNSPECIFIED WHETHER SCIATICA PRESENT: ICD-10-CM

## 2023-10-03 DIAGNOSIS — M25.562 CHRONIC PAIN OF LEFT KNEE: ICD-10-CM

## 2023-10-03 DIAGNOSIS — I10 BENIGN ESSENTIAL HYPERTENSION: Primary | ICD-10-CM

## 2023-10-03 DIAGNOSIS — Z53.20 REFUSAL OF STATIN MEDICATION BY PATIENT: ICD-10-CM

## 2023-10-03 DIAGNOSIS — E78.5 ELEVATED LIPIDS: ICD-10-CM

## 2023-10-03 DIAGNOSIS — G89.29 CHRONIC LOW BACK PAIN, UNSPECIFIED BACK PAIN LATERALITY, UNSPECIFIED WHETHER SCIATICA PRESENT: ICD-10-CM

## 2023-10-03 PROCEDURE — 99214 OFFICE O/P EST MOD 30 MIN: CPT | Performed by: FAMILY MEDICINE

## 2023-10-03 RX ORDER — PREDNISONE 10 MG/1
TABLET ORAL
Qty: 30 TABLET | Refills: 0 | Status: SHIPPED | OUTPATIENT
Start: 2023-10-03 | End: 2024-06-12

## 2023-10-03 RX ORDER — LISINOPRIL 10 MG/1
TABLET ORAL
Qty: 135 TABLET | Refills: 1 | Status: SHIPPED | OUTPATIENT
Start: 2023-10-03

## 2023-10-03 NOTE — PROGRESS NOTES
Assessment & Plan     Benign essential hypertension  Under good control with lisinopril  - lisinopril (ZESTRIL) 10 MG tablet; TAKE 1 AND 1/2 TABLETS(15 MG) BY MOUTH TWICE DAILY    Elevated lipids  Stable    Glaucoma suspect, bilateral  Followed by ophthalmology    Refusal of statin medication by patient      Chronic pain of left knee  Chronic he like to have prednisone for that occasional flareup he uses it once or twice a year  - predniSONE (DELTASONE) 10 MG tablet; Take 4 tabs a day for 3 days and taper by 1 tab every 3 days    Chronic low back pain, unspecified back pain laterality, unspecified whether sciatica present  An MRI many years ago would like to see orthospine about possible injections  - Spine  Referral; Future                 Demond Tuttle MD  [facility] [place]    Alessandra Lakhani is a 67 year old, presenting for the following health issues: Overall doing well.  Been retired now for a few years his significant other Dottie is also in good health.  He struggles with his orthopedic issues especially his lower back which is chronic but also his left knee and he has some chronic IT band issues.  Medication Refill (Lisinopril and prednisone. Uses prednisone for general aches/pains/chronic back pain. )      10/3/2023     9:03 AM   Additional Questions   Roomed by Lilian       Medication Refill    History of Present Illness       Back Pain:  He presents for follow up of back pain. Patient's back pain is a chronic problem.  Location of back pain:  Right lower back, left lower back, right middle of back, left middle of back, right hip and right side of waist  Description of back pain: other  Back pain spreads: right buttocks, left buttocks and right thigh    Since patient first noticed back pain, pain is: gradually worsening  Does back pain interfere with his job:  Yes       Hypertension: He presents for follow up of hypertension.  He does check blood pressure  regularly  "outside of the clinic. Outpatient blood pressures have not been over 140/90. He does not follow a low salt diet.     He eats 4 or more servings of fruits and vegetables daily.He consumes 2 sweetened beverage(s) daily.He exercises with enough effort to increase his heart rate 60 or more minutes per day.  He exercises with enough effort to increase his heart rate 6 days per week.   He is taking medications regularly.           Patient Active Problem List   Diagnosis    Benign essential hypertension    Chronic low back pain    Elevated lipids    Spondylosis of lumbar spine    Refusal of statin medication by patient    Glaucoma suspect, bilateral     Current Outpatient Medications   Medication    amoxicillin (AMOXIL) 500 MG capsule    cholecalciferol, vitamin D3, (VITAMIN D3) 25 mcg (1,000 unit) capsule    DOXYCYCLINE PO    fish oil-omega-3 fatty acids (FISH OIL) 300-1,000 mg capsule    Flaxseed, Linseed, (FLAXSEED OIL PO)    folic acid (FOLVITE) 1 MG tablet    ibuprofen (ADVIL,MOTRIN) 200 MG tablet    lisinopril (ZESTRIL) 10 MG tablet    LUTEIN PO    Multiple Vitamin (MULTIVITAMIN ADULT PO)    niacinamide 500 MG tablet    predniSONE (DELTASONE) 10 MG tablet    SIMBRINZA 1-0.2 % ophthalmic suspension    Turmeric 500 MG CAPS    Zinc 25 MG TABS     No current facility-administered medications for this visit.           Review of Systems   Constitutional, HEENT, cardiovascular, pulmonary, gi and gu systems are negative, except as otherwise noted.      Objective    /66 (BP Location: Right arm, Patient Position: Sitting, Cuff Size: Adult Large)   Pulse 68   Temp 97.7  F (36.5  C) (Temporal)   Resp 16   Ht 1.905 m (6' 3\")   Wt 84.4 kg (186 lb)   SpO2 99%   BMI 23.25 kg/m    Body mass index is 23.25 kg/m .  Physical Exam   GENERAL: healthy, alert and no distress  NECK: no adenopathy, no asymmetry, masses, or scars and thyroid normal to palpation  RESP: lungs clear to auscultation - no rales, rhonchi or wheezes  CV: " regular rate and rhythm, normal S1 S2, no S3 or S4, no murmur, click or rub, no peripheral edema and peripheral pulses strong  ABDOMEN: soft, nontender, no hepatosplenomegaly, no masses and bowel sounds normal  MS: no gross musculoskeletal defects noted, no edema    Office Visit on 03/28/2023   Component Date Value Ref Range Status    Sodium 03/28/2023 140  136 - 145 mmol/L Final    Potassium 03/28/2023 5.0  3.4 - 5.3 mmol/L Final    Chloride 03/28/2023 103  98 - 107 mmol/L Final    Carbon Dioxide (CO2) 03/28/2023 25  22 - 29 mmol/L Final    Anion Gap 03/28/2023 12  7 - 15 mmol/L Final    Urea Nitrogen 03/28/2023 26.0 (H)  8.0 - 23.0 mg/dL Final    Creatinine 03/28/2023 0.98  0.67 - 1.17 mg/dL Final    Calcium 03/28/2023 10.0  8.8 - 10.2 mg/dL Final    Glucose 03/28/2023 92  70 - 99 mg/dL Final    GFR Estimate 03/28/2023 84  >60 mL/min/1.73m2 Final    eGFR calculated using 2021 CKD-EPI equation.    Cholesterol 03/28/2023 249 (H)  <200 mg/dL Final    Triglycerides 03/28/2023 59  <150 mg/dL Final    Direct Measure HDL 03/28/2023 77  >=40 mg/dL Final    LDL Cholesterol Calculated 03/28/2023 160 (H)  <=100 mg/dL Final    Non HDL Cholesterol 03/28/2023 172 (H)  <130 mg/dL Final    Prostate Specific Antigen Screen 03/28/2023 1.05  0.00 - 4.50 ng/mL Final    WBC Count 03/28/2023 8.3  4.0 - 11.0 10e3/uL Final    RBC Count 03/28/2023 5.03  4.40 - 5.90 10e6/uL Final    Hemoglobin 03/28/2023 15.4  13.3 - 17.7 g/dL Final    Hematocrit 03/28/2023 46.6  40.0 - 53.0 % Final    MCV 03/28/2023 93  78 - 100 fL Final    MCH 03/28/2023 30.6  26.5 - 33.0 pg Final    MCHC 03/28/2023 33.0  31.5 - 36.5 g/dL Final    RDW 03/28/2023 12.5  10.0 - 15.0 % Final    Platelet Count 03/28/2023 336  150 - 450 10e3/uL Final    % Neutrophils 03/28/2023 49  % Final    % Lymphocytes 03/28/2023 40  % Final    % Monocytes 03/28/2023 9  % Final    % Eosinophils 03/28/2023 1  % Final    % Basophils 03/28/2023 1  % Final    % Immature Granulocytes 03/28/2023  0  % Final    Absolute Neutrophils 03/28/2023 4.1  1.6 - 8.3 10e3/uL Final    Absolute Lymphocytes 03/28/2023 3.3  0.8 - 5.3 10e3/uL Final    Absolute Monocytes 03/28/2023 0.7  0.0 - 1.3 10e3/uL Final    Absolute Eosinophils 03/28/2023 0.1  0.0 - 0.7 10e3/uL Final    Absolute Basophils 03/28/2023 0.0  0.0 - 0.2 10e3/uL Final    Absolute Immature Granulocytes 03/28/2023 0.0  <=0.4 10e3/uL Final

## 2023-11-01 ENCOUNTER — MEDICAL CORRESPONDENCE (OUTPATIENT)
Dept: HEALTH INFORMATION MANAGEMENT | Facility: CLINIC | Age: 68
End: 2023-11-01
Payer: MEDICARE

## 2023-11-02 ENCOUNTER — ANCILLARY PROCEDURE (OUTPATIENT)
Dept: GENERAL RADIOLOGY | Facility: CLINIC | Age: 68
End: 2023-11-02
Attending: PHYSICAL MEDICINE & REHABILITATION
Payer: MEDICARE

## 2023-11-02 ENCOUNTER — TRANSFERRED RECORDS (OUTPATIENT)
Dept: HEALTH INFORMATION MANAGEMENT | Facility: CLINIC | Age: 68
End: 2023-11-02

## 2023-11-02 DIAGNOSIS — M54.50 LOW BACK PAIN: ICD-10-CM

## 2023-11-09 NOTE — ADDENDUM NOTE
Addended by: FAUSTINO GALAVIZ on: 3/18/2022 08:55 AM     Modules accepted: Orders     Spironolactone Counseling: Patient advised regarding risks of diarrhea, abdominal pain, hyperkalemia, birth defects (for female patients), liver toxicity and renal toxicity. The patient may need blood work to monitor liver and kidney function and potassium levels while on therapy. The patient verbalized understanding of the proper use and possible adverse effects of spironolactone.  All of the patient's questions and concerns were addressed.

## 2023-11-10 ENCOUNTER — TRANSFERRED RECORDS (OUTPATIENT)
Dept: HEALTH INFORMATION MANAGEMENT | Facility: CLINIC | Age: 68
End: 2023-11-10

## 2023-11-15 ENCOUNTER — TRANSFERRED RECORDS (OUTPATIENT)
Dept: HEALTH INFORMATION MANAGEMENT | Facility: CLINIC | Age: 68
End: 2023-11-15
Payer: MEDICARE

## 2023-12-11 ENCOUNTER — TRANSFERRED RECORDS (OUTPATIENT)
Dept: HEALTH INFORMATION MANAGEMENT | Facility: CLINIC | Age: 68
End: 2023-12-11
Payer: MEDICARE

## 2023-12-21 ENCOUNTER — TRANSFERRED RECORDS (OUTPATIENT)
Dept: HEALTH INFORMATION MANAGEMENT | Facility: CLINIC | Age: 68
End: 2023-12-21
Payer: MEDICARE

## 2024-03-06 ENCOUNTER — TRANSFERRED RECORDS (OUTPATIENT)
Dept: HEALTH INFORMATION MANAGEMENT | Facility: CLINIC | Age: 69
End: 2024-03-06
Payer: MEDICARE

## 2024-03-06 DIAGNOSIS — M25.551 RIGHT HIP PAIN: Primary | ICD-10-CM

## 2024-03-07 ENCOUNTER — LAB (OUTPATIENT)
Dept: LAB | Facility: CLINIC | Age: 69
End: 2024-03-07
Payer: MEDICARE

## 2024-03-07 DIAGNOSIS — M25.551 RIGHT HIP PAIN: ICD-10-CM

## 2024-03-07 DIAGNOSIS — I10 BENIGN ESSENTIAL HYPERTENSION: Primary | ICD-10-CM

## 2024-03-07 LAB
ANION GAP SERPL CALCULATED.3IONS-SCNC: 7 MMOL/L (ref 7–15)
BASOPHILS # BLD AUTO: 0 10E3/UL (ref 0–0.2)
BASOPHILS NFR BLD AUTO: 0 %
BUN SERPL-MCNC: 26 MG/DL (ref 8–23)
CALCIUM SERPL-MCNC: 9.5 MG/DL (ref 8.8–10.2)
CHLORIDE SERPL-SCNC: 106 MMOL/L (ref 98–107)
CHOLEST SERPL-MCNC: 208 MG/DL
CREAT SERPL-MCNC: 1.05 MG/DL (ref 0.67–1.17)
CRP SERPL-MCNC: <3 MG/L
DEPRECATED HCO3 PLAS-SCNC: 27 MMOL/L (ref 22–29)
EGFRCR SERPLBLD CKD-EPI 2021: 77 ML/MIN/1.73M2
EOSINOPHIL # BLD AUTO: 0.1 10E3/UL (ref 0–0.7)
EOSINOPHIL NFR BLD AUTO: 1 %
ERYTHROCYTE [DISTWIDTH] IN BLOOD BY AUTOMATED COUNT: 12.1 % (ref 10–15)
ERYTHROCYTE [SEDIMENTATION RATE] IN BLOOD BY WESTERGREN METHOD: 3 MM/HR (ref 0–20)
FASTING STATUS PATIENT QL REPORTED: NO
GLUCOSE SERPL-MCNC: 88 MG/DL (ref 70–99)
HCT VFR BLD AUTO: 42.1 % (ref 40–53)
HDLC SERPL-MCNC: 61 MG/DL
HGB BLD-MCNC: 13.9 G/DL (ref 13.3–17.7)
IMM GRANULOCYTES # BLD: 0 10E3/UL
IMM GRANULOCYTES NFR BLD: 0 %
LDLC SERPL CALC-MCNC: 117 MG/DL
LYMPHOCYTES # BLD AUTO: 2.8 10E3/UL (ref 0.8–5.3)
LYMPHOCYTES NFR BLD AUTO: 36 %
MCH RBC QN AUTO: 30.8 PG (ref 26.5–33)
MCHC RBC AUTO-ENTMCNC: 33 G/DL (ref 31.5–36.5)
MCV RBC AUTO: 93 FL (ref 78–100)
MONOCYTES # BLD AUTO: 0.6 10E3/UL (ref 0–1.3)
MONOCYTES NFR BLD AUTO: 7 %
NEUTROPHILS # BLD AUTO: 4.3 10E3/UL (ref 1.6–8.3)
NEUTROPHILS NFR BLD AUTO: 56 %
NONHDLC SERPL-MCNC: 147 MG/DL
PLATELET # BLD AUTO: 331 10E3/UL (ref 150–450)
POTASSIUM SERPL-SCNC: 4.6 MMOL/L (ref 3.4–5.3)
RBC # BLD AUTO: 4.52 10E6/UL (ref 4.4–5.9)
SODIUM SERPL-SCNC: 140 MMOL/L (ref 135–145)
TRIGL SERPL-MCNC: 152 MG/DL
WBC # BLD AUTO: 7.7 10E3/UL (ref 4–11)

## 2024-03-07 PROCEDURE — 36415 COLL VENOUS BLD VENIPUNCTURE: CPT

## 2024-03-07 PROCEDURE — 85652 RBC SED RATE AUTOMATED: CPT

## 2024-03-07 PROCEDURE — 86140 C-REACTIVE PROTEIN: CPT

## 2024-03-07 PROCEDURE — 80048 BASIC METABOLIC PNL TOTAL CA: CPT

## 2024-03-07 PROCEDURE — 80061 LIPID PANEL: CPT

## 2024-03-07 PROCEDURE — 85025 COMPLETE CBC W/AUTO DIFF WBC: CPT | Mod: QW

## 2024-03-11 ENCOUNTER — TRANSFERRED RECORDS (OUTPATIENT)
Dept: HEALTH INFORMATION MANAGEMENT | Facility: CLINIC | Age: 69
End: 2024-03-11
Payer: MEDICARE

## 2024-04-18 ENCOUNTER — TRANSFERRED RECORDS (OUTPATIENT)
Dept: HEALTH INFORMATION MANAGEMENT | Facility: CLINIC | Age: 69
End: 2024-04-18
Payer: MEDICARE

## 2024-04-22 ENCOUNTER — TRANSFERRED RECORDS (OUTPATIENT)
Dept: HEALTH INFORMATION MANAGEMENT | Facility: CLINIC | Age: 69
End: 2024-04-22
Payer: MEDICARE

## 2024-04-24 ENCOUNTER — TRANSFERRED RECORDS (OUTPATIENT)
Dept: HEALTH INFORMATION MANAGEMENT | Facility: CLINIC | Age: 69
End: 2024-04-24

## 2024-05-02 ENCOUNTER — TRANSFERRED RECORDS (OUTPATIENT)
Dept: HEALTH INFORMATION MANAGEMENT | Facility: CLINIC | Age: 69
End: 2024-05-02
Payer: MEDICARE

## 2024-05-08 ENCOUNTER — TRANSFERRED RECORDS (OUTPATIENT)
Dept: HEALTH INFORMATION MANAGEMENT | Facility: CLINIC | Age: 69
End: 2024-05-08

## 2024-05-21 ENCOUNTER — TRANSFERRED RECORDS (OUTPATIENT)
Dept: HEALTH INFORMATION MANAGEMENT | Facility: CLINIC | Age: 69
End: 2024-05-21
Payer: MEDICARE

## 2024-05-30 ENCOUNTER — TRANSFERRED RECORDS (OUTPATIENT)
Dept: HEALTH INFORMATION MANAGEMENT | Facility: CLINIC | Age: 69
End: 2024-05-30
Payer: MEDICARE

## 2024-06-11 ENCOUNTER — MEDICAL CORRESPONDENCE (OUTPATIENT)
Dept: HEALTH INFORMATION MANAGEMENT | Facility: CLINIC | Age: 69
End: 2024-06-11
Payer: MEDICARE

## 2024-06-11 ENCOUNTER — TRANSFERRED RECORDS (OUTPATIENT)
Dept: HEALTH INFORMATION MANAGEMENT | Facility: CLINIC | Age: 69
End: 2024-06-11
Payer: MEDICARE

## 2024-06-11 ASSESSMENT — PATIENT HEALTH QUESTIONNAIRE - PHQ9
SUM OF ALL RESPONSES TO PHQ QUESTIONS 1-9: 19
SUM OF ALL RESPONSES TO PHQ QUESTIONS 1-9: 19
10. IF YOU CHECKED OFF ANY PROBLEMS, HOW DIFFICULT HAVE THESE PROBLEMS MADE IT FOR YOU TO DO YOUR WORK, TAKE CARE OF THINGS AT HOME, OR GET ALONG WITH OTHER PEOPLE: SOMEWHAT DIFFICULT

## 2024-06-12 ENCOUNTER — OFFICE VISIT (OUTPATIENT)
Dept: FAMILY MEDICINE | Facility: CLINIC | Age: 69
End: 2024-06-12
Payer: MEDICARE

## 2024-06-12 VITALS
TEMPERATURE: 97.2 F | HEART RATE: 60 BPM | OXYGEN SATURATION: 98 % | WEIGHT: 188.7 LBS | HEIGHT: 75 IN | BODY MASS INDEX: 23.46 KG/M2 | SYSTOLIC BLOOD PRESSURE: 138 MMHG | RESPIRATION RATE: 16 BRPM | DIASTOLIC BLOOD PRESSURE: 86 MMHG

## 2024-06-12 DIAGNOSIS — Z01.818 PREOPERATIVE EXAMINATION: ICD-10-CM

## 2024-06-12 DIAGNOSIS — H25.89 OTHER AGE-RELATED CATARACT, UNSPECIFIED LATERALITY: Primary | ICD-10-CM

## 2024-06-12 PROCEDURE — 99213 OFFICE O/P EST LOW 20 MIN: CPT | Performed by: FAMILY MEDICINE

## 2024-06-12 RX ORDER — TIZANIDINE 2 MG/1
2-4 TABLET ORAL
COMMUNITY
Start: 2024-05-16

## 2024-06-12 RX ORDER — MELOXICAM 7.5 MG/1
1 TABLET ORAL PRN
COMMUNITY
Start: 2024-04-08
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
## 2024-06-14 ENCOUNTER — TRANSCRIBE ORDERS (OUTPATIENT)
Dept: OTHER | Age: 69
End: 2024-06-14

## 2024-06-14 DIAGNOSIS — M47.817 LUMBOSACRAL SPONDYLOSIS WITHOUT MYELOPATHY: Primary | ICD-10-CM

## 2024-06-17 ENCOUNTER — TRANSFERRED RECORDS (OUTPATIENT)
Dept: HEALTH INFORMATION MANAGEMENT | Facility: CLINIC | Age: 69
End: 2024-06-17
Payer: MEDICARE

## 2024-06-26 DIAGNOSIS — M47.817 LUMBAR AND SACRAL SPONDYLARTHRITIS: Primary | ICD-10-CM

## 2024-07-09 ENCOUNTER — TRANSFERRED RECORDS (OUTPATIENT)
Dept: HEALTH INFORMATION MANAGEMENT | Facility: CLINIC | Age: 69
End: 2024-07-09
Payer: MEDICARE

## 2024-07-15 NOTE — TELEPHONE ENCOUNTER
Action July 15, 2024 1:34 PM MT   Action Taken Sent a request for imaging and records from [facility].      Action July 16, 2024 8:08 AM MT   Action Taken Called and spoke with [facility] medical records, rep: Silke javed fax over STAT.     DIAGNOSIS: Lumbosacral spondylosis without myelopathy [M47.817]  - Primary   APPOINTMENT DATE: 07/16/2024   NOTES STATUS DETAILS   OFFICE NOTE from referring provider Media Tab Christiano Trejo MD  [facility]   OPERATIVE REPORT Care Everywhere 06/04/2013 - Revision RT JULIO CESAR   INJECTIONS PACS [facility]:  05/08/2024-  Left L4-L5 NRI  12/19/2023 - Bilateral L2-L4 NRI  11/28/2023 - Bilateral L4 NRI   MRI PACS [facility]:  04/24/2024, 11/10/2023 - L Spine   CT SCAN PACS [facility]:  04/01/2013 - Hip   XRAYS (IMAGES & REPORTS) PACS Internal    [facility]:  2024 to 2012 - Hip/Pelvis  11/02/2023, 12/02/2010 - L Spine

## 2024-07-16 ENCOUNTER — OFFICE VISIT (OUTPATIENT)
Dept: ORTHOPEDICS | Facility: CLINIC | Age: 69
End: 2024-07-16
Payer: MEDICARE

## 2024-07-16 ENCOUNTER — PRE VISIT (OUTPATIENT)
Dept: ORTHOPEDICS | Facility: CLINIC | Age: 69
End: 2024-07-16

## 2024-07-16 ENCOUNTER — ANCILLARY PROCEDURE (OUTPATIENT)
Dept: GENERAL RADIOLOGY | Facility: CLINIC | Age: 69
End: 2024-07-16
Attending: ORTHOPAEDIC SURGERY
Payer: MEDICARE

## 2024-07-16 ENCOUNTER — ANCILLARY PROCEDURE (OUTPATIENT)
Dept: GENERAL RADIOLOGY | Facility: CLINIC | Age: 69
End: 2024-07-16
Attending: PHYSICIAN ASSISTANT
Payer: MEDICARE

## 2024-07-16 VITALS — HEIGHT: 73 IN | WEIGHT: 183 LBS | BODY MASS INDEX: 24.25 KG/M2

## 2024-07-16 DIAGNOSIS — M40.36 FLATBACK SYNDROME OF LUMBAR REGION: ICD-10-CM

## 2024-07-16 DIAGNOSIS — M47.817 LUMBOSACRAL SPONDYLOSIS WITHOUT MYELOPATHY: Primary | ICD-10-CM

## 2024-07-16 DIAGNOSIS — M85.88 OSTEOPENIA OF OTHER SITE: ICD-10-CM

## 2024-07-16 DIAGNOSIS — G96.198 DURAL ECTASIA: ICD-10-CM

## 2024-07-16 DIAGNOSIS — M48.062 LUMBAR STENOSIS WITH NEUROGENIC CLAUDICATION: ICD-10-CM

## 2024-07-16 DIAGNOSIS — M47.817 LUMBOSACRAL SPONDYLOSIS WITHOUT MYELOPATHY: ICD-10-CM

## 2024-07-16 DIAGNOSIS — M41.50 DEGENERATIVE SCOLIOSIS IN ADULT PATIENT: Primary | ICD-10-CM

## 2024-07-16 DIAGNOSIS — M47.817 LUMBAR AND SACRAL SPONDYLARTHRITIS: ICD-10-CM

## 2024-07-16 PROCEDURE — 99205 OFFICE O/P NEW HI 60 MIN: CPT | Performed by: ORTHOPAEDIC SURGERY

## 2024-07-16 PROCEDURE — 72082 X-RAY EXAM ENTIRE SPI 2/3 VW: CPT | Mod: GC | Performed by: RADIOLOGY

## 2024-07-16 PROCEDURE — 77073 BONE LENGTH STUDIES: CPT | Mod: GC | Performed by: RADIOLOGY
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
## 2024-07-19 NOTE — PROGRESS NOTES
Teaching Flowsheet   Relevant Diagnosis: spine  Teaching Topic: preop     Person(s) involved in teaching:   Patient     Motivation Level:  Asks Questions: Yes  Eager to Learn: Yes  Cooperative: Yes  Receptive (willing/able to accept information): Yes  Any cultural factors/Cheondoism beliefs that may influence understanding or compliance? No       Patient demonstrates understanding of the following:  Reason for the appointment, diagnosis and treatment plan: Yes  Knowledge of proper use of medications and conditions for which they are ordered (with special attention to potential side effects or drug interactions): Yes  Which situations necessitate calling provider and whom to contact: Yes       Teaching Concerns Addressed:        Proper use and care of meds (medical equip, care aids, etc.): Yes  Nutritional needs and diet plan: Yes  Pain management techniques: Yes  Wound Care: Yes  How and/when to access community resources: Yes     Instructional Materials Used/Given: preop pkt     Time spent with patient: 15 minutes.

## 2024-07-25 ENCOUNTER — HOSPITAL ENCOUNTER (OUTPATIENT)
Dept: CT IMAGING | Facility: HOSPITAL | Age: 69
Discharge: HOME | End: 2024-07-25
Attending: PHYSICIAN ASSISTANT
Payer: MEDICARE

## 2024-07-25 ENCOUNTER — HOSPITAL ENCOUNTER (OUTPATIENT)
Dept: BONE DENSITY | Facility: HOSPITAL | Age: 69
Discharge: HOME | End: 2024-07-25
Attending: PHYSICIAN ASSISTANT
Payer: MEDICARE

## 2024-07-25 DIAGNOSIS — M47.817 LUMBOSACRAL SPONDYLOSIS WITHOUT MYELOPATHY: ICD-10-CM

## 2024-07-25 DIAGNOSIS — G96.198 DURAL ECTASIA: ICD-10-CM

## 2024-07-25 DIAGNOSIS — M41.50 DEGENERATIVE SCOLIOSIS IN ADULT PATIENT: ICD-10-CM

## 2024-07-25 DIAGNOSIS — M85.88 OSTEOPENIA OF OTHER SITE: ICD-10-CM

## 2024-07-25 DIAGNOSIS — M48.062 LUMBAR STENOSIS WITH NEUROGENIC CLAUDICATION: ICD-10-CM

## 2024-07-25 PROCEDURE — 77080 DXA BONE DENSITY AXIAL: CPT

## 2024-07-25 PROCEDURE — 72128 CT CHEST SPINE W/O DYE: CPT | Mod: MG

## 2024-07-25 PROCEDURE — 72131 CT LUMBAR SPINE W/O DYE: CPT | Mod: MG

## 2024-07-25 PROCEDURE — 77089 TBS DXA CAL W/I&R FX RISK: CPT

## 2024-07-26 ENCOUNTER — TELEPHONE (OUTPATIENT)
Dept: ORTHOPEDICS | Facility: CLINIC | Age: 69
End: 2024-07-26
Payer: MEDICARE

## 2024-07-26 NOTE — TELEPHONE ENCOUNTER
Phoned patient to informed him that we are still coordinating a date with either Dr. Lockhart or Dr. Song who will be assisting Dr. Pedroza in his surgery.     Patient understood and thanked caller for an update. Patient understands that it may take a couple of weeks but once a date or dates are confirmed; surgery coordinator will reach out to him again to confirm date and get schedule for any other needed appointments.     Patient had no further questions or concerns.     Provided call back number of [phone] & [phone] for care team.

## 2024-07-27 ENCOUNTER — HEALTH MAINTENANCE LETTER (OUTPATIENT)
Age: 69
End: 2024-07-27

## 2024-07-31 ENCOUNTER — OFFICE VISIT (OUTPATIENT)
Dept: FAMILY MEDICINE | Facility: CLINIC | Age: 69
End: 2024-07-31
Payer: MEDICARE

## 2024-07-31 VITALS
HEIGHT: 73 IN | OXYGEN SATURATION: 97 % | WEIGHT: 187.4 LBS | SYSTOLIC BLOOD PRESSURE: 118 MMHG | TEMPERATURE: 97.4 F | HEART RATE: 65 BPM | BODY MASS INDEX: 24.84 KG/M2 | DIASTOLIC BLOOD PRESSURE: 70 MMHG

## 2024-07-31 DIAGNOSIS — Z53.20 REFUSAL OF STATIN MEDICATION BY PATIENT: ICD-10-CM

## 2024-07-31 DIAGNOSIS — E78.5 ELEVATED LIPIDS: ICD-10-CM

## 2024-07-31 DIAGNOSIS — I10 BENIGN ESSENTIAL HYPERTENSION: Primary | ICD-10-CM

## 2024-07-31 DIAGNOSIS — H40.003 GLAUCOMA SUSPECT, BILATERAL: ICD-10-CM

## 2024-07-31 PROCEDURE — G2211 COMPLEX E/M VISIT ADD ON: HCPCS | Performed by: FAMILY MEDICINE

## 2024-07-31 PROCEDURE — 99214 OFFICE O/P EST MOD 30 MIN: CPT | Performed by: FAMILY MEDICINE

## 2024-07-31 RX ORDER — LISINOPRIL 10 MG/1
10 TABLET ORAL 2 TIMES DAILY
Qty: 180 TABLET | Refills: 3 | Status: SHIPPED | OUTPATIENT
Start: 2024-07-31

## 2024-07-31 RX ORDER — LISINOPRIL 10 MG/1
TABLET ORAL
Qty: 135 TABLET | Refills: 1 | Status: CANCELLED | OUTPATIENT
Start: 2024-07-31

## 2024-07-31 NOTE — PROGRESS NOTES
Assessment & Plan     Benign essential hypertension  Controlled with lisinopril continue with current treatment continue to track blood pressures  - lisinopril (ZESTRIL) 10 MG tablet; Take 1 tablet (10 mg) by mouth 2 times daily    Elevated lipids  Unchanged    Refusal of statin medication by patient      Glaucoma suspect, bilateral  Followed by ophthalmology pressures controlled        The longitudinal plan of care for the diagnosis(es)/condition(s) as documented were addressed during this visit. Due to the added complexity in care, I will continue to support Kar in the subsequent management and with ongoing continuity of care.          Subjective   Kar is a 68 year old, presenting for the following health issues: Overall doing well.  Continues to live independently with his significant other Dottie.  Her mother has some health issues.  Patient has an upcoming spine surgery this fall.  No current concerns  Refill Request (Here to get Lisinopril refilled.)        7/31/2024     8:35 AM   Additional Questions   Roomed by Megan     History of Present Illness       Reason for visit:  Refill Lisinapryl    He eats 2-3 servings of fruits and vegetables daily.He consumes 1 sweetened beverage(s) daily.He exercises with enough effort to increase his heart rate 60 or more minutes per day.  He exercises with enough effort to increase his heart rate 5 days per week.   He is taking medications regularly.           Patient Active Problem List   Diagnosis    Benign essential hypertension    Chronic low back pain    Elevated lipids    Spondylosis of lumbar spine    Refusal of statin medication by patient    Glaucoma suspect, bilateral     Current Outpatient Medications   Medication Sig Dispense Refill    amoxicillin (AMOXIL) 500 MG capsule TK FOUR CS PO 1 HOUR B DAPP      cholecalciferol, vitamin D3, (VITAMIN D3) 25 mcg (1,000 unit) capsule [CHOLECALCIFEROL, VITAMIN D3, (VITAMIN D3) 25 MCG (1,000 UNIT) CAPSULE] Take 1,000  "Units by mouth daily.      DOXYCYCLINE PO Take by mouth daily as needed (tick bite)      fish oil-omega-3 fatty acids (FISH OIL) 300-1,000 mg capsule [FISH OIL-OMEGA-3 FATTY ACIDS (FISH OIL) 300-1,000 MG CAPSULE] Take 2 g by mouth daily.      Flaxseed, Linseed, (FLAXSEED OIL PO) Take by mouth daily      folic acid (FOLVITE) 1 MG tablet Take 1 mg by mouth      ibuprofen (ADVIL,MOTRIN) 200 MG tablet Take 200 mg by mouth every 6 hours as needed      lisinopril (ZESTRIL) 10 MG tablet Take 1 tablet (10 mg) by mouth 2 times daily 180 tablet 3    lisinopril (ZESTRIL) 10 MG tablet TAKE 1 AND 1/2 TABLETS(15 MG) BY MOUTH TWICE DAILY 135 tablet 1    LUTEIN PO Take by mouth daily      meloxicam (MOBIC) 7.5 MG tablet Take 1 tablet by mouth 2 times daily      Multiple Vitamin (MULTIVITAMIN ADULT PO) Take 1 capsule by mouth daily      niacinamide 500 MG tablet Take 500 mg by mouth daily      SIMBRINZA 1-0.2 % ophthalmic suspension SHAKE LIQUID AND INSTILL 1 DROP IN LEFT EYE TWICE DAILY      tiZANidine (ZANAFLEX) 2 MG tablet Take 2-4 mg by mouth nightly as needed      Turmeric 500 MG CAPS Take 1,000 mg by mouth daily      Zinc 25 MG TABS Take by mouth daily       No current facility-administered medications for this visit.           Review of Systems  Constitutional, HEENT, cardiovascular, pulmonary, gi and gu systems are negative, except as otherwise noted.      Objective    /70 (BP Location: Right arm, Patient Position: Sitting, Cuff Size: Adult Large)   Pulse 65   Temp 97.4  F (36.3  C) (Tympanic)   Ht 1.861 m (6' 1.25\")   Wt 85 kg (187 lb 6.4 oz)   SpO2 97%   BMI 24.56 kg/m    Body mass index is 24.56 kg/m .  Physical Exam   GENERAL: alert and no distress  NECK: no adenopathy, no asymmetry, masses, or scars  RESP: lungs clear to auscultation - no rales, rhonchi or wheezes  CV: regular rate and rhythm, normal S1 S2, no S3 or S4, no murmur, click or rub, no peripheral edema  MS: no gross musculoskeletal defects noted, " "no edema    Lab on 03/07/2024   Component Date Value Ref Range Status    Erythrocyte Sedimentation Rate 03/07/2024 3  0 - 20 mm/hr Final    CRP Inflammation 03/07/2024 <3.00  <5.00 mg/L Final    Sodium 03/07/2024 140  135 - 145 mmol/L Final    Reference intervals for this test were updated on 09/26/2023 to more accurately reflect our healthy population. There may be differences in the flagging of prior results with similar values performed with this method. Interpretation of those prior results can be made in the context of the updated reference intervals.     Potassium 03/07/2024 4.6  3.4 - 5.3 mmol/L Final    Chloride 03/07/2024 106  98 - 107 mmol/L Final    Carbon Dioxide (CO2) 03/07/2024 27  22 - 29 mmol/L Final    Anion Gap 03/07/2024 7  7 - 15 mmol/L Final    Urea Nitrogen 03/07/2024 26.0 (H)  8.0 - 23.0 mg/dL Final    Creatinine 03/07/2024 1.05  0.67 - 1.17 mg/dL Final    GFR Estimate 03/07/2024 77  >60 mL/min/1.73m2 Final    Calcium 03/07/2024 9.5  8.8 - 10.2 mg/dL Final    Glucose 03/07/2024 88  70 - 99 mg/dL Final    Cholesterol 03/07/2024 208 (H)  <200 mg/dL Final    Triglycerides 03/07/2024 152 (H)  <150 mg/dL Final    Direct Measure HDL 03/07/2024 61  >=40 mg/dL Final    LDL Cholesterol Calculated 03/07/2024 117 (H)  <=100 mg/dL Final    Non HDL Cholesterol 03/07/2024 147 (H)  <130 mg/dL Final    Patient Fasting > 8hrs? 03/07/2024 No   Final    \"I didn't know I was suppose to fast\"  \"I didn't know I was suppose to fast\"  \"I didn't know I was suppose to fast\"    WBC Count 03/07/2024 7.7  4.0 - 11.0 10e3/uL Final    RBC Count 03/07/2024 4.52  4.40 - 5.90 10e6/uL Final    Hemoglobin 03/07/2024 13.9  13.3 - 17.7 g/dL Final    Hematocrit 03/07/2024 42.1  40.0 - 53.0 % Final    MCV 03/07/2024 93  78 - 100 fL Final    MCH 03/07/2024 30.8  26.5 - 33.0 pg Final    MCHC 03/07/2024 33.0  31.5 - 36.5 g/dL Final    RDW 03/07/2024 12.1  10.0 - 15.0 % Final    Platelet Count 03/07/2024 331  150 - 450 10e3/uL Final    " % Neutrophils 03/07/2024 56  % Final    % Lymphocytes 03/07/2024 36  % Final    % Monocytes 03/07/2024 7  % Final    % Eosinophils 03/07/2024 1  % Final    % Basophils 03/07/2024 0  % Final    % Immature Granulocytes 03/07/2024 0  % Final    Absolute Neutrophils 03/07/2024 4.3  1.6 - 8.3 10e3/uL Final    Absolute Lymphocytes 03/07/2024 2.8  0.8 - 5.3 10e3/uL Final    Absolute Monocytes 03/07/2024 0.6  0.0 - 1.3 10e3/uL Final    Absolute Eosinophils 03/07/2024 0.1  0.0 - 0.7 10e3/uL Final    Absolute Basophils 03/07/2024 0.0  0.0 - 0.2 10e3/uL Final    Absolute Immature Granulocytes 03/07/2024 0.0  <=0.4 10e3/uL Final           Signed Electronically by: Demond Tuttle MD

## 2024-08-16 NOTE — TELEPHONE ENCOUNTER
Phoned patient to confirm a surgey date that has been confirmed by both of the surgeons that will be performing his surgery.     Call went to voicemail. Provided reason of call and call back number of [phone] and [phone] for care team.     Will try again later.

## 2024-08-16 NOTE — TELEPHONE ENCOUNTER
Phoned patient again after receiving missed call by patient.     Spoke with patient, confirmed surgery date of 10/9/24 with Dr. Pderoza & Dr. Song.     Patient, however, will call back later today or Monday morning to schedule PAC as he relies on his spouse to provide a ride.     Provided direct call back number of [phone] to schedule and [phone] for care team.     No further questions or concerns.

## 2024-08-16 NOTE — TELEPHONE ENCOUNTER
Patient is scheduled for surgery with Dr. Pedroza & Dr. Song     Spoke with: Kar    Date of Surgery: 10/9/24    Location: [facility] OR    Informed patient they will need an adult  Yes    Pre op with Provider PAC, 9/10/24 at 10:45AM    Additional imaging/appointments: PO Made 11/21/24 at 10:40AM    Surgery packet: Received     Additional comments: N/A         Lacey Chong on 8/16/2024 at 10:12 AM

## 2024-09-09 LAB
ABO/RH(D): NORMAL
ANTIBODY SCREEN: NEGATIVE
SPECIMEN EXPIRATION DATE: NORMAL

## 2024-09-10 ENCOUNTER — LAB (OUTPATIENT)
Dept: LAB | Facility: CLINIC | Age: 69
End: 2024-09-10
Payer: MEDICARE

## 2024-09-10 ENCOUNTER — PRE VISIT (OUTPATIENT)
Dept: SURGERY | Facility: CLINIC | Age: 69
End: 2024-09-10

## 2024-09-10 ENCOUNTER — APPOINTMENT (OUTPATIENT)
Dept: LAB | Facility: CLINIC | Age: 69
End: 2024-09-10
Payer: MEDICARE

## 2024-09-10 ENCOUNTER — OFFICE VISIT (OUTPATIENT)
Dept: SURGERY | Facility: CLINIC | Age: 69
End: 2024-09-10
Payer: MEDICARE

## 2024-09-10 VITALS
OXYGEN SATURATION: 96 % | HEIGHT: 73 IN | DIASTOLIC BLOOD PRESSURE: 70 MMHG | HEART RATE: 59 BPM | SYSTOLIC BLOOD PRESSURE: 145 MMHG | TEMPERATURE: 98.6 F | BODY MASS INDEX: 24.76 KG/M2 | WEIGHT: 186.8 LBS | RESPIRATION RATE: 16 BRPM

## 2024-09-10 DIAGNOSIS — M48.062 LUMBAR STENOSIS WITH NEUROGENIC CLAUDICATION: ICD-10-CM

## 2024-09-10 DIAGNOSIS — Z01.818 PRE-OP EVALUATION: Primary | ICD-10-CM

## 2024-09-10 DIAGNOSIS — G96.198 DURAL ECTASIA: ICD-10-CM

## 2024-09-10 DIAGNOSIS — M85.88 OSTEOPENIA OF OTHER SITE: ICD-10-CM

## 2024-09-10 DIAGNOSIS — Z01.818 PRE-OP EVALUATION: ICD-10-CM

## 2024-09-10 LAB
ANION GAP SERPL CALCULATED.3IONS-SCNC: 11 MMOL/L (ref 7–15)
BUN SERPL-MCNC: 31.4 MG/DL (ref 8–23)
CALCIUM SERPL-MCNC: 9.7 MG/DL (ref 8.8–10.4)
CHLORIDE SERPL-SCNC: 103 MMOL/L (ref 98–107)
CREAT SERPL-MCNC: 1.04 MG/DL (ref 0.67–1.17)
EGFRCR SERPLBLD CKD-EPI 2021: 78 ML/MIN/1.73M2
ERYTHROCYTE [DISTWIDTH] IN BLOOD BY AUTOMATED COUNT: 12.6 % (ref 10–15)
GLUCOSE SERPL-MCNC: 94 MG/DL (ref 70–99)
HCO3 SERPL-SCNC: 25 MMOL/L (ref 22–29)
HCT VFR BLD AUTO: 44.5 % (ref 40–53)
HGB BLD-MCNC: 14.9 G/DL (ref 13.3–17.7)
HOLD SPECIMEN: NORMAL
MCH RBC QN AUTO: 30.7 PG (ref 26.5–33)
MCHC RBC AUTO-ENTMCNC: 33.5 G/DL (ref 31.5–36.5)
MCV RBC AUTO: 92 FL (ref 78–100)
PLATELET # BLD AUTO: 309 10E3/UL (ref 150–450)
POTASSIUM SERPL-SCNC: 4.3 MMOL/L (ref 3.4–5.3)
RBC # BLD AUTO: 4.86 10E6/UL (ref 4.4–5.9)
SODIUM SERPL-SCNC: 139 MMOL/L (ref 135–145)
WBC # BLD AUTO: 8.6 10E3/UL (ref 4–11)

## 2024-09-10 PROCEDURE — 99203 OFFICE O/P NEW LOW 30 MIN: CPT | Performed by: PHYSICIAN ASSISTANT

## 2024-09-10 PROCEDURE — 80048 BASIC METABOLIC PNL TOTAL CA: CPT | Performed by: PATHOLOGY

## 2024-09-10 PROCEDURE — 86900 BLOOD TYPING SEROLOGIC ABO: CPT

## 2024-09-10 PROCEDURE — 36415 COLL VENOUS BLD VENIPUNCTURE: CPT | Performed by: PATHOLOGY

## 2024-09-10 PROCEDURE — 85027 COMPLETE CBC AUTOMATED: CPT | Performed by: PATHOLOGY

## 2024-09-10 ASSESSMENT — ENCOUNTER SYMPTOMS
SEIZURES: 0
DYSRHYTHMIAS: 0

## 2024-09-10 ASSESSMENT — PAIN SCALES - GENERAL: PAINLEVEL: SEVERE PAIN (6)
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
## 2024-09-10 NOTE — PATIENT INSTRUCTIONS
Preparing for Your Surgery      Name:  Kar López   MRN:  [id number]   :  1955   Today's Date:  9/10/2024       Arriving for surgery:  Surgery date:  10/09/2024  Arrival time:  5:30 am    Please come to:         [facility] [address]   [address] [place]  [address]     The Green Ramp for patients and visitors is beneath the [facility]. The parking facility entrance is at the intersection of [address] [place] [address] and [address]. Patients and visitors who self-park will receive the reduced hospital parking rate (no ticket validation needed).     [employer] parking, located at the [facility] main entrance on [address] [place] [address], is available Monday - Friday from 7 am to 3:30 pm.     Discounted parking pass options can be purchased from  attendants during business hours.     -Check in at the security desk in the [facility] ([facility] [place])   Lobby. They will direct you to the correct elevators.   -Proceed to the 3rd floor, Adult Surgery Waiting Lounge. [phone]     If you need directions, a wheelchair or escort please stop at the Information Desk in the lobby.  Inform the information person you are here for surgery; a wheelchair and escort to [address] will be provided.   An escort to the Adult Surgery Waiting Lounge will be provided. .    What can I eat or drink?  -  You may eat and drink normally up to 8 hours prior to arrival time. (Until 10/8/24 at 10 pm)  -  You may have clear liquids until 2 hours prior to arrival time. (Until 3:30 am)    Examples of clear liquids:  Water  Clear broth  Juices (apple, white grape, white cranberry  and cider) without pulp  Noncarbonated, powder based beverages  (lemonade and Kong-Aid)  Sodas (Sprite, 7-Up, ginger ale and seltzer)  Coffee or tea (without milk or cream)  Gatorade    -  No Alcohol or cannabis products for at least 24  hours before surgery.     Which medicines can I take?    Hold Aspirin for 7 days before surgery.     Hold Multivitamins for 7 days before surgery.  Hold Supplements for 7 days before surgery. (Fish oil, Flaxeed,Lutein, Niacinamide, Turmeric)    Hold Ibuprofen (Advil, Motrin) for 3 day(s) before surgery--unless otherwise directed by surgeon.  Hold Naproxen (Aleve) for 4 days before surgery.    Hold Meloxicam (Mobic) for 10 days before surgery    -  DO NOT take these medications the day of surgery:  Lisinopril  Folic acid        -  PLEASE TAKE these medications the day of surgery:  Eye drops per your routine and bring to hospital  Tizanidine       How do I prepare myself?  - Please take 2 showers (one the night prior to surgery and one the morning of surgery) using Scrubcare or Hibiclens soap.    Use this soap only from the neck to your toes.     Leave the soap on your skin for one minute--then rinse thoroughly.      You may use your own shampoo and conditioner. No other hair products.   - Please remove all jewelry and body piercings.  - No lotions, deodorants or fragrance.  - No makeup or fingernail polish.   - Bring your ID and insurance card.    -If you use a CPAP machine, please bring the CPAP machine, tubing, and mask to hospital.    -If you have a Deep Brain Stimulator, Spinal Cord Stimulator, or any Neuro Stimulator device---you must bring the remote control to the hospital.      ALL PATIENTS GOING HOME THE SAME DAY OF SURGERY ARE REQUIRED TO HAVE A RESPONSIBLE ADULT TO DRIVE AND BE IN ATTENDANCE WITH THEM FOR 24 HOURS FOLLOWING SURGERY.    Covid testing policy as of 12/06/2022  Your surgeon will notify and schedule you for a COVID test if one is needed before surgery--please direct any questions or COVID symptoms to your surgeon      Questions or Concerns:    - For any questions regarding the day of surgery or your hospital stay, please contact the Pre Admission Nursing Office at [phone].       - If you  have health changes between today and your surgery, please call your surgeon.       - For questions after surgery, please call your surgeons office.           Current Visitor Guidelines    You may have 2 visitors in the pre op area.    Visiting hours: 8 a.m. to 8:30 p.m.    Patients confirmed or suspected to have symptoms of COVID 19 or flu:     No visitors allowed for adult patients.   Children (under age 18) can have 1 named visitor.     People who are sick or showing symptoms of COVID 19 or flu:    Are not allowed to visit patients--we can only make exceptions in special situations.       Please follow these guidelines for your visit:          Please maintain social distance          Masking is optional--however at times you may be asked to wear a mask for the safety of yourself and others     Clean your hands with alcohol hand . Do this when you arrive at and leave the building and patient room,    And again after you touch your mask or anything in the room.     Go directly to and from the room you are visiting.     Stay in the patient s room during your visit. Limit going to other places in the hospital as much as possible     Leave bags and jackets at home or in the car.     For everyone s health, please don t come and go during your visit. That includes for smoking   during your visit.

## 2024-09-24 ENCOUNTER — MYC MEDICAL ADVICE (OUTPATIENT)
Dept: ORTHOPEDICS | Facility: CLINIC | Age: 69
End: 2024-09-24
Payer: MEDICARE

## 2024-09-24 NOTE — TELEPHONE ENCOUNTER
See phone message from pt.     I called back & told pt.  that yes we do have a PA dept that submitted request to Insurance so if pt has not received a letter from Insurance, then pt should call insurance to verify if surgery is approved.  Pt agreed.  Call back prn.    Kaitlyn Matias RN .

## 2024-09-25 NOTE — TELEPHONE ENCOUNTER
See My Chart message from pt & reply below from Ranjeet PLATT dept who called wife & told them surgery is approved.    Ranjeet El Stacey, ATC5 hours ago (10:15 AM)     MANJU Jimenez,    I talked to the wife. It's all good.    Ranjeet Matias RN.

## 2024-10-08 ENCOUNTER — MYC MEDICAL ADVICE (OUTPATIENT)
Dept: FAMILY MEDICINE | Facility: CLINIC | Age: 69
End: 2024-10-08
Payer: COMMERCIAL

## 2024-10-08 RX ORDER — THIAMINE HCL 100 MG
TABLET ORAL 2 TIMES DAILY
COMMUNITY

## 2024-10-08 RX ORDER — FLUOROMETHOLONE 1 MG/ML
1 SUSPENSION/ DROPS OPHTHALMIC DAILY
COMMUNITY
Start: 2024-09-26

## 2024-10-08 NOTE — TELEPHONE ENCOUNTER
Phoned patient to informed him about his surgery being cancelled due to Medical supply shortages. Patient understood and will await for a c/b once he is ready to reschedule.      Patient was provided call back number of [phone].

## 2024-10-08 NOTE — TELEPHONE ENCOUNTER
Patient Reported Meds.    -Citracal Maximum Plus +d3    One caplet twice daily  -Fluorometholone 0.1% Opth Susp   One drop in left eye daily

## 2024-10-18 ENCOUNTER — PRE VISIT (OUTPATIENT)
Dept: SURGERY | Facility: CLINIC | Age: 69
End: 2024-10-18
Payer: COMMERCIAL

## 2024-10-18 NOTE — TELEPHONE ENCOUNTER
FUTURE VISIT INFORMATION      SURGERY INFORMATION:  Date: 2024  Location[facility]  Surgeon:  Chin Pedroza MD and  Nasim Song MD   Anesthesia Type:  General   Procedure: Posterior Instrumented Spinal Fusion Lumbar 1 or 2 to Pelvis; Transforaminal Lumbar Interbody Fusion with Castaneda-Gipson Osteotomy Lumbar 2-Sacral 1 (4 levels); Possible Cement Augmentation of Screws; Use of Infuse Bone Morphogenetic Protein Large kit and Allograft       RECORDS REQUESTED FROM:       Primary Care Provider: Demond Tuttle    Most recent EKG+ Tracin2020

## 2024-11-06 LAB
ABO/RH(D): NORMAL
ANTIBODY SCREEN: NEGATIVE
SPECIMEN EXPIRATION DATE: NORMAL

## 2024-11-07 ENCOUNTER — OFFICE VISIT (OUTPATIENT)
Dept: SURGERY | Facility: CLINIC | Age: 69
End: 2024-11-07
Payer: COMMERCIAL

## 2024-11-07 ENCOUNTER — LAB (OUTPATIENT)
Dept: LAB | Facility: CLINIC | Age: 69
End: 2024-11-07
Payer: COMMERCIAL

## 2024-11-07 ENCOUNTER — APPOINTMENT (OUTPATIENT)
Dept: LAB | Facility: CLINIC | Age: 69
End: 2024-11-07
Payer: MEDICARE

## 2024-11-07 VITALS
TEMPERATURE: 98 F | SYSTOLIC BLOOD PRESSURE: 157 MMHG | HEART RATE: 58 BPM | HEIGHT: 73 IN | DIASTOLIC BLOOD PRESSURE: 83 MMHG | OXYGEN SATURATION: 100 % | RESPIRATION RATE: 16 BRPM | WEIGHT: 189.6 LBS | BODY MASS INDEX: 25.13 KG/M2

## 2024-11-07 DIAGNOSIS — Z01.818 PRE-OP EVALUATION: Primary | ICD-10-CM

## 2024-11-07 DIAGNOSIS — Z01.818 PRE-OP EVALUATION: ICD-10-CM

## 2024-11-07 DIAGNOSIS — M47.816 SPONDYLOSIS OF LUMBAR SPINE: ICD-10-CM

## 2024-11-07 DIAGNOSIS — M48.062 LUMBAR STENOSIS WITH NEUROGENIC CLAUDICATION: ICD-10-CM

## 2024-11-07 PROCEDURE — 99214 OFFICE O/P EST MOD 30 MIN: CPT | Performed by: NURSE PRACTITIONER

## 2024-11-07 PROCEDURE — 36415 COLL VENOUS BLD VENIPUNCTURE: CPT | Performed by: PATHOLOGY

## 2024-11-07 ASSESSMENT — PAIN SCALES - GENERAL: PAINLEVEL_OUTOF10: MODERATE PAIN (4)

## 2024-11-07 ASSESSMENT — ENCOUNTER SYMPTOMS: ORTHOPNEA: 0

## 2024-11-07 NOTE — PATIENT INSTRUCTIONS
Preparing for Your Surgery      Name:  Kar López   MRN:  [id number]   :  1955   Today's Date:  2024       Arriving for surgery:  Surgery date:  24  Arrival time:  5.30AM    Please come to:     Please come to:       [facility] [address]   [address] [place]  [address]     The Green Ramp for patients and visitors is beneath the [facility]. The parking facility entrance is at the intersection of [address] [place] [address] and [address]. Patients and visitors who self-park will receive the reduced hospital parking rate (no ticket validation needed).     [employer] parking, located at the [facility] main entrance on [address] [place] [address], is available Monday - Friday from 7 am to 3:30 pm.     Discounted parking pass options can be purchased from  attendants during business hours.     -Check in at the security desk in the [facility] ([facility])   Lobby. They will direct you to the correct elevators.   -Proceed to the 3rd floor, Adult Surgery Waiting Lounge. [phone]     If you need directions, a wheelchair or escort please stop at the Information Desk in the lobby.  Inform the information person you are here for surgery; a wheelchair and escort to [address] will be provided.   An escort to the Adult Surgery Waiting Lounge will be provided. .    What can I eat or drink?  -  You may eat and drink normally up to 8 hours prior to arrival time. (Until 9.30PM)  -  You may have clear liquids until 2 hours prior to arrival time. (Until 3.30AM)    Examples of clear liquids:  Water  Clear broth  Juices (apple, white grape, white cranberry  and cider) without pulp  Noncarbonated, powder based beverages  (lemonade and Kong-Aid)  Sodas (Sprite, 7-Up, ginger ale and seltzer)  Coffee or tea (without milk or cream)  Gatorade    -  No Alcohol or cannabis products for at least 24  hours before surgery.     Which medicines can I take?    Hold Aspirin for 7 days before surgery.      Hold Multivitamins for 7 days before surgery.  Hold Supplements for 7 days before surgery. (Fish oil, Flaxeed, Lutein, Niacinamide, Turmeric, Folic Acid, Zinc)     Hold Ibuprofen (Advil, Motrin) for 3 day(s) before surgery--unless otherwise directed by surgeon.  Hold Naproxen (Aleve) for 4 days before surgery.     Hold Meloxicam (Mobic) for 10 days before surgery     -  DO NOT take these medications the day of surgery:  Calcium - Vitamin D  Vitamin D3  Lisinopril (Zestril)     -  PLEASE TAKE these medications the day of surgery:  Eye drops per scheduled.   Tizanidine     How do I prepare myself?  - Please take 2 showers (one the night prior to surgery and one the morning of surgery) using Scrubcare or Hibiclens soap.    Use this soap only from the neck to your toes. Avoid genital area      Leave the soap on your skin for one minute--then rinse thoroughly.      You may use your own shampoo and conditioner. No other hair products.   - Please remove all jewelry and body piercings.  - No lotions, deodorants or fragrance.  - No makeup or fingernail polish.   - Bring your ID and insurance card.    -If you use a CPAP machine, please bring the CPAP machine, tubing, and mask to hospital.    -If you have a Deep Brain Stimulator, Spinal Cord Stimulator, or any Neuro Stimulator device---you must bring the remote control to the hospital.      ALL PATIENTS GOING HOME THE SAME DAY OF SURGERY ARE REQUIRED TO HAVE A RESPONSIBLE ADULT TO DRIVE AND BE IN ATTENDANCE WITH THEM FOR 24 HOURS FOLLOWING SURGERY.    Covid testing policy as of 12/06/2022  Your surgeon will notify and schedule you for a COVID test if one is needed before surgery--please direct any questions or COVID symptoms to your surgeon      Questions or Concerns:    - For any questions regarding the day of surgery or your hospital stay, please contact the Pre Admission  Nursing Office at [phone].       - If you have health changes between today and your surgery, please call your surgeon.       - For questions after surgery, please call your surgeons office.           Current Visitor Guidelines    You may have 2 visitors in the pre op area.    Visiting hours: 8 a.m. to 8:30 p.m.    Patients confirmed or suspected to have symptoms of COVID 19 or flu:     No visitors allowed for adult patients.   Children (under age 18) can have 1 named visitor.     People who are sick or showing symptoms of COVID 19 or flu:    Are not allowed to visit patients--we can only make exceptions in special situations.       Please follow these guidelines for your visit:          Please maintain social distance          Masking is optional--however at times you may be asked to wear a mask for the safety of yourself and others     Clean your hands with alcohol hand . Do this when you arrive at and leave the building and patient room,    And again after you touch your mask or anything in the room.     Go directly to and from the room you are visiting.     Stay in the patient s room during your visit. Limit going to other places in the hospital as much as possible     Leave bags and jackets at home or in the car.     For everyone s health, please don t come and go during your visit. That includes for smoking   during your visit.           OPTIMAL RECOVERY AFTER SURGERY        Begin hydrating yourself by drinking at least 8-10 glasses of clear liquids for 24 hours before surgery:      Suggested clear liquids:   Water    Clear Juices   Clear Broth   Non- carbonated beverages    (Crystal Light or Kong Aid)   Sodas    (Sprite, 7 up, ginger ale, seltzer)   Gatorade              Drink clear liquids up until 4 hours before your surgery.       We would like you to purchase a drink such as Gatorade or Ensure Clear (not the milkshake type).  Drink this before bedtime and the morning of surgery drink between  8-10 ounces or until you feel hydrated.        Keeping well hydrated leads to your veins being plump, you wake up faster, and you are less likely to be nauseated. Start drinking water as soon as you can after surgery and advance to clear liquids and food as tolerated.  IV fluids contain salt, drinking fluids will minimize the amount of IV fluids you need and decrease the amount of salt you get.                 The most common reason for the patient to be readmitted is dehydration. Staying hydrated after you go home from the hospital is very important.  Ensure or Ensure Clear are good options to keep you hydrated.

## 2024-11-07 NOTE — PROVIDER NOTIFICATION
11/07/24 1008   Discharge Planning   Patient/Family Anticipates Transition to home with family   Concerns to be Addressed all concerns addressed in this encounter   Living Arrangements   People in Home spouse   Type of Residence Private Residence   Is your private residence a single family home or apartment? Single family home   Number of Stairs, Within Home, Primary two   Stair Railings, Within Home, Primary railings on both sides of stairs   Once home, are you able to live on one level? Yes   Which level? Main Level   Bathroom Shower/Tub Tub/Shower unit   Equipment Currently Used at Home cane, quad;crutches   Support System   Support Systems Spouse/Significant Other   Do you have someone available to stay with you one or two nights once you are home? Yes   Relationship/Environment   Name(s) of People in Home Dottie stephens
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
## 2024-11-20 DIAGNOSIS — Z01.818 PRE-OP EVALUATION: Primary | ICD-10-CM

## 2024-11-26 ENCOUNTER — PATIENT OUTREACH (OUTPATIENT)
Dept: FAMILY MEDICINE | Facility: CLINIC | Age: 69
End: 2024-11-26
Payer: COMMERCIAL

## 2024-11-26 NOTE — TELEPHONE ENCOUNTER
Patient Quality Outreach    Patient is due for the following:   Physical Annual Wellness Visit    Action(s) Taken:   Schedule a Annual Wellness Visit    Type of outreach:    Sent Te[employer] message.    Questions for provider review:    None           Evonne Cook MA

## 2024-12-04 ENCOUNTER — APPOINTMENT (OUTPATIENT)
Dept: GENERAL RADIOLOGY | Facility: CLINIC | Age: 69
DRG: 427 | End: 2024-12-04
Attending: ORTHOPAEDIC SURGERY
Payer: MEDICARE

## 2024-12-04 ENCOUNTER — HOSPITAL ENCOUNTER (INPATIENT)
Facility: CLINIC | Age: 69
DRG: 427 | End: 2024-12-04
Attending: ORTHOPAEDIC SURGERY | Admitting: ORTHOPAEDIC SURGERY
Payer: MEDICARE

## 2024-12-04 DIAGNOSIS — Z98.1 S/P LUMBAR FUSION: Primary | ICD-10-CM

## 2024-12-04 DIAGNOSIS — G89.29 CHRONIC LOW BACK PAIN, UNSPECIFIED BACK PAIN LATERALITY, UNSPECIFIED WHETHER SCIATICA PRESENT: ICD-10-CM

## 2024-12-04 DIAGNOSIS — I10 BENIGN ESSENTIAL HYPERTENSION: ICD-10-CM

## 2024-12-04 DIAGNOSIS — M41.25 OTHER IDIOPATHIC SCOLIOSIS, THORACOLUMBAR REGION: ICD-10-CM

## 2024-12-04 DIAGNOSIS — M54.50 CHRONIC LOW BACK PAIN, UNSPECIFIED BACK PAIN LATERALITY, UNSPECIFIED WHETHER SCIATICA PRESENT: ICD-10-CM

## 2024-12-04 PROBLEM — M41.9 SCOLIOSIS: Status: ACTIVE | Noted: 2024-12-04

## 2024-12-04 LAB
BASE EXCESS BLDA CALC-SCNC: -1.7 MMOL/L (ref -3–3)
BASE EXCESS BLDA CALC-SCNC: -1.9 MMOL/L (ref -3–3)
BASE EXCESS BLDA CALC-SCNC: -3.4 MMOL/L (ref -3–3)
CA-I BLD-MCNC: 4.6 MG/DL (ref 4.4–5.2)
CA-I BLD-MCNC: 4.9 MG/DL (ref 4.4–5.2)
CA-I BLD-MCNC: 5.5 MG/DL (ref 4.4–5.2)
CREAT SERPL-MCNC: 1.09 MG/DL (ref 0.67–1.17)
EGFRCR SERPLBLD CKD-EPI 2021: 73 ML/MIN/1.73M2
GLUCOSE BLD-MCNC: 142 MG/DL (ref 70–99)
GLUCOSE BLD-MCNC: 145 MG/DL (ref 70–99)
GLUCOSE BLD-MCNC: 158 MG/DL (ref 70–99)
GLUCOSE BLDC GLUCOMTR-MCNC: 125 MG/DL (ref 70–99)
GLUCOSE BLDC GLUCOMTR-MCNC: 128 MG/DL (ref 70–99)
HCO3 BLDA-SCNC: 22 MMOL/L (ref 21–28)
HCO3 BLDA-SCNC: 23 MMOL/L (ref 21–28)
HCO3 BLDA-SCNC: 23 MMOL/L (ref 21–28)
HGB BLD-MCNC: 11.3 G/DL (ref 13.3–17.7)
HGB BLD-MCNC: 11.7 G/DL (ref 13.3–17.7)
HGB BLD-MCNC: 12.7 G/DL (ref 13.3–17.7)
LACTATE BLD-SCNC: 1.1 MMOL/L (ref 0.7–2)
LACTATE BLD-SCNC: 1.3 MMOL/L (ref 0.7–2)
LACTATE BLD-SCNC: 1.3 MMOL/L (ref 0.7–2)
O2/TOTAL GAS SETTING VFR VENT: 50 %
OXYHGB MFR BLDA: 98 % (ref 92–100)
PCO2 BLDA: 38 MM HG (ref 35–45)
PCO2 BLDA: 39 MM HG (ref 35–45)
PCO2 BLDA: 40 MM HG (ref 35–45)
PH BLDA: 7.35 [PH] (ref 7.35–7.45)
PH BLDA: 7.38 [PH] (ref 7.35–7.45)
PH BLDA: 7.39 [PH] (ref 7.35–7.45)
PO2 BLDA: 137 MM HG (ref 80–105)
PO2 BLDA: 137 MM HG (ref 80–105)
PO2 BLDA: 144 MM HG (ref 80–105)
POTASSIUM BLD-SCNC: 4.2 MMOL/L (ref 3.4–5.3)
POTASSIUM BLD-SCNC: 4.5 MMOL/L (ref 3.4–5.3)
POTASSIUM BLD-SCNC: 4.5 MMOL/L (ref 3.4–5.3)
POTASSIUM SERPL-SCNC: 4.3 MMOL/L (ref 3.4–5.3)
SAO2 % BLDA: 100 % (ref 95–96)
SAO2 % BLDA: 99 % (ref 95–96)
SAO2 % BLDA: 99 % (ref 95–96)
SODIUM BLD-SCNC: 136 MMOL/L (ref 135–145)
SODIUM BLD-SCNC: 136 MMOL/L (ref 135–145)
SODIUM BLD-SCNC: 137 MMOL/L (ref 135–145)

## 2024-12-04 PROCEDURE — 82330 ASSAY OF CALCIUM: CPT

## 2024-12-04 PROCEDURE — 22614 ARTHRD PST TQ 1NTRSPC EA ADD: CPT | Mod: 62 | Performed by: ORTHOPAEDIC SURGERY

## 2024-12-04 PROCEDURE — 120N000002 HC R&B MED SURG/OB UMMC

## 2024-12-04 PROCEDURE — 272N000001 HC OR GENERAL SUPPLY STERILE: Performed by: ORTHOPAEDIC SURGERY

## 2024-12-04 PROCEDURE — 63052 LAM FACETC/FRMT ARTHRD LUM 1: CPT | Mod: 62 | Performed by: NEUROLOGICAL SURGERY

## 2024-12-04 PROCEDURE — 63052 LAM FACETC/FRMT ARTHRD LUM 1: CPT | Mod: 62 | Performed by: ORTHOPAEDIC SURGERY

## 2024-12-04 PROCEDURE — 99222 1ST HOSP IP/OBS MODERATE 55: CPT | Mod: AI

## 2024-12-04 PROCEDURE — 61783 SCAN PROC SPINAL: CPT | Mod: XS | Performed by: ORTHOPAEDIC SURGERY

## 2024-12-04 PROCEDURE — 22853 INSJ BIOMECHANICAL DEVICE: CPT | Performed by: ORTHOPAEDIC SURGERY

## 2024-12-04 PROCEDURE — 84132 ASSAY OF SERUM POTASSIUM: CPT

## 2024-12-04 PROCEDURE — 999N000141 HC STATISTIC PRE-PROCEDURE NURSING ASSESSMENT: Performed by: ORTHOPAEDIC SURGERY

## 2024-12-04 PROCEDURE — 22214 INCIS 1 VERTEBRAL SEG LUMBAR: CPT | Mod: 62 | Performed by: NEUROLOGICAL SURGERY

## 2024-12-04 PROCEDURE — 20930 SP BONE ALGRFT MORSEL ADD-ON: CPT | Performed by: ORTHOPAEDIC SURGERY

## 2024-12-04 PROCEDURE — 0SG3071 FUSION OF LUMBOSACRAL JOINT WITH AUTOLOGOUS TISSUE SUBSTITUTE, POSTERIOR APPROACH, POSTERIOR COLUMN, OPEN APPROACH: ICD-10-PCS | Performed by: ORTHOPAEDIC SURGERY

## 2024-12-04 PROCEDURE — 22633 ARTHRD CMBN 1NTRSPC LUMBAR: CPT | Mod: 62 | Performed by: NEUROLOGICAL SURGERY

## 2024-12-04 PROCEDURE — 250N000025 HC SEVOFLURANE, PER MIN: Performed by: ORTHOPAEDIC SURGERY

## 2024-12-04 PROCEDURE — L0472 TLSO RIGID FRAME HYPEREX PRE: HCPCS

## 2024-12-04 PROCEDURE — 01NR0ZZ RELEASE SACRAL NERVE, OPEN APPROACH: ICD-10-PCS | Performed by: ORTHOPAEDIC SURGERY

## 2024-12-04 PROCEDURE — 0SG10AJ FUSION OF 2 OR MORE LUMBAR VERTEBRAL JOINTS WITH INTERBODY FUSION DEVICE, POSTERIOR APPROACH, ANTERIOR COLUMN, OPEN APPROACH: ICD-10-PCS | Performed by: ORTHOPAEDIC SURGERY

## 2024-12-04 PROCEDURE — 250N000011 HC RX IP 250 OP 636: Mod: JZ | Performed by: PHYSICIAN ASSISTANT

## 2024-12-04 PROCEDURE — XNH7058 INSERTION OF INTERNAL FIXATION DEVICE WITH TULIP CONNECTOR INTO LEFT PELVIC BONE, OPEN APPROACH, NEW TECHNOLOGY GROUP 8: ICD-10-PCS | Performed by: ORTHOPAEDIC SURGERY

## 2024-12-04 PROCEDURE — C1762 CONN TISS, HUMAN(INC FASCIA): HCPCS | Performed by: ORTHOPAEDIC SURGERY

## 2024-12-04 PROCEDURE — 22216 INCIS ADDL SPINE SEGMENT: CPT | Mod: 62 | Performed by: ORTHOPAEDIC SURGERY

## 2024-12-04 PROCEDURE — 22848 INSERT PELV FIXATION DEVICE: CPT | Performed by: ORTHOPAEDIC SURGERY

## 2024-12-04 PROCEDURE — 82565 ASSAY OF CREATININE: CPT | Performed by: PHYSICIAN ASSISTANT

## 2024-12-04 PROCEDURE — 0SB40ZZ EXCISION OF LUMBOSACRAL DISC, OPEN APPROACH: ICD-10-PCS | Performed by: ORTHOPAEDIC SURGERY

## 2024-12-04 PROCEDURE — 22216 INCIS ADDL SPINE SEGMENT: CPT | Mod: 62 | Performed by: NEUROLOGICAL SURGERY

## 2024-12-04 PROCEDURE — XNH6058 INSERTION OF INTERNAL FIXATION DEVICE WITH TULIP CONNECTOR INTO RIGHT PELVIC BONE, OPEN APPROACH, NEW TECHNOLOGY GROUP 8: ICD-10-PCS | Performed by: ORTHOPAEDIC SURGERY

## 2024-12-04 PROCEDURE — 22633 ARTHRD CMBN 1NTRSPC LUMBAR: CPT | Mod: 62 | Performed by: ORTHOPAEDIC SURGERY

## 2024-12-04 PROCEDURE — 710N000010 HC RECOVERY PHASE 1, LEVEL 2, PER MIN: Performed by: ORTHOPAEDIC SURGERY

## 2024-12-04 PROCEDURE — 22634 ARTHRD CMBN 1NTRSPC EA ADDL: CPT | Mod: 62 | Performed by: NEUROLOGICAL SURGERY

## 2024-12-04 PROCEDURE — 250N000011 HC RX IP 250 OP 636: Performed by: STUDENT IN AN ORGANIZED HEALTH CARE EDUCATION/TRAINING PROGRAM

## 2024-12-04 PROCEDURE — 36415 COLL VENOUS BLD VENIPUNCTURE: CPT | Performed by: PHYSICIAN ASSISTANT

## 2024-12-04 PROCEDURE — 22614 ARTHRD PST TQ 1NTRSPC EA ADD: CPT | Mod: 62 | Performed by: NEUROLOGICAL SURGERY

## 2024-12-04 PROCEDURE — C1713 ANCHOR/SCREW BN/BN,TIS/BN: HCPCS | Performed by: ORTHOPAEDIC SURGERY

## 2024-12-04 PROCEDURE — 01NB0ZZ RELEASE LUMBAR NERVE, OPEN APPROACH: ICD-10-PCS | Performed by: ORTHOPAEDIC SURGERY

## 2024-12-04 PROCEDURE — 250N000011 HC RX IP 250 OP 636: Performed by: ORTHOPAEDIC SURGERY

## 2024-12-04 PROCEDURE — 22842 INSERT SPINE FIXATION DEVICE: CPT | Performed by: ORTHOPAEDIC SURGERY

## 2024-12-04 PROCEDURE — 0SG30AJ FUSION OF LUMBOSACRAL JOINT WITH INTERBODY FUSION DEVICE, POSTERIOR APPROACH, ANTERIOR COLUMN, OPEN APPROACH: ICD-10-PCS | Performed by: ORTHOPAEDIC SURGERY

## 2024-12-04 PROCEDURE — 0SB20ZZ EXCISION OF LUMBAR VERTEBRAL DISC, OPEN APPROACH: ICD-10-PCS | Performed by: ORTHOPAEDIC SURGERY

## 2024-12-04 PROCEDURE — L8699 PROSTHETIC IMPLANT NOS: HCPCS | Performed by: ORTHOPAEDIC SURGERY

## 2024-12-04 PROCEDURE — 22853 INSJ BIOMECHANICAL DEVICE: CPT | Mod: 82 | Performed by: NEUROLOGICAL SURGERY

## 2024-12-04 PROCEDURE — 22214 INCIS 1 VERTEBRAL SEG LUMBAR: CPT | Mod: 62 | Performed by: ORTHOPAEDIC SURGERY

## 2024-12-04 PROCEDURE — 0QS004Z REPOSITION LUMBAR VERTEBRA WITH INTERNAL FIXATION DEVICE, OPEN APPROACH: ICD-10-PCS | Performed by: ORTHOPAEDIC SURGERY

## 2024-12-04 PROCEDURE — 999N000182 XR SURGERY OARM: Mod: TC

## 2024-12-04 PROCEDURE — 84132 ASSAY OF SERUM POTASSIUM: CPT | Performed by: PHYSICIAN ASSISTANT

## 2024-12-04 PROCEDURE — 258N000003 HC RX IP 258 OP 636: Performed by: PHYSICIAN ASSISTANT

## 2024-12-04 PROCEDURE — 20936 SP BONE AGRFT LOCAL ADD-ON: CPT | Performed by: ORTHOPAEDIC SURGERY

## 2024-12-04 PROCEDURE — 360N000086 HC SURGERY LEVEL 6 W/ FLUORO, PER MIN: Performed by: ORTHOPAEDIC SURGERY

## 2024-12-04 PROCEDURE — 0SG1071 FUSION OF 2 OR MORE LUMBAR VERTEBRAL JOINTS WITH AUTOLOGOUS TISSUE SUBSTITUTE, POSTERIOR APPROACH, POSTERIOR COLUMN, OPEN APPROACH: ICD-10-PCS | Performed by: ORTHOPAEDIC SURGERY

## 2024-12-04 PROCEDURE — C1889 IMPLANT/INSERT DEVICE, NOC: HCPCS | Performed by: ORTHOPAEDIC SURGERY

## 2024-12-04 PROCEDURE — 22634 ARTHRD CMBN 1NTRSPC EA ADDL: CPT | Mod: 62 | Performed by: ORTHOPAEDIC SURGERY

## 2024-12-04 PROCEDURE — 250N000013 HC RX MED GY IP 250 OP 250 PS 637: Performed by: PHYSICIAN ASSISTANT

## 2024-12-04 PROCEDURE — 370N000017 HC ANESTHESIA TECHNICAL FEE, PER MIN: Performed by: ORTHOPAEDIC SURGERY

## 2024-12-04 PROCEDURE — 8E0WXBZ COMPUTER ASSISTED PROCEDURE OF TRUNK REGION: ICD-10-PCS | Performed by: ORTHOPAEDIC SURGERY

## 2024-12-04 DEVICE — IMP SCR MEDT 5.5/6.0MM SOLERA 5.5X50MM MA 55840005550: Type: IMPLANTABLE DEVICE | Site: SPINE LUMBAR | Status: FUNCTIONAL

## 2024-12-04 DEVICE — GRAFT BONE INFUSE BMP LG 7510600: Type: IMPLANTABLE DEVICE | Site: SPINE LUMBAR | Status: FUNCTIONAL

## 2024-12-04 DEVICE — IMP SCR MEDT 5.5/6.0MM SOLERA 5.0X50MM MA 55840005050: Type: IMPLANTABLE DEVICE | Site: SPINE LUMBAR | Status: FUNCTIONAL

## 2024-12-04 DEVICE — IMP SCR SET MEDT SOLERA BREAK OFF 5.5MM TI 5540030: Type: IMPLANTABLE DEVICE | Site: SPINE LUMBAR | Status: FUNCTIONAL

## 2024-12-04 DEVICE — 10.5 MM X 90 MM IFUSE BEDROCK GRANITE IMPLANT
Type: IMPLANTABLE DEVICE | Site: SACRUM | Status: FUNCTIONAL
Brand: IFUSE GRANITE IMPLANT SYSTEM

## 2024-12-04 DEVICE — SCREW BN 90MM 9.5MM MA CNN NS CD HZN SPNE LF BS CNMAS TC: Type: IMPLANTABLE DEVICE | Site: SACRUM | Status: FUNCTIONAL

## 2024-12-04 DEVICE — IMP SCR MEDT BALLAST 9.5X100MM 25840019510: Type: IMPLANTABLE DEVICE | Site: SACRUM | Status: FUNCTIONAL

## 2024-12-04 DEVICE — GRAFT BN CANC 30CC CRSH 1-10MM 800104: Type: IMPLANTABLE DEVICE | Site: SPINE LUMBAR | Status: FUNCTIONAL

## 2024-12-04 DEVICE — SCREW 55720005550 5.5/6.0 DRMAS 5.5X50
Type: IMPLANTABLE DEVICE | Site: SPINE LUMBAR | Status: FUNCTIONAL
Brand: CD HORIZON® SOLERA® SPINAL SYSTEM

## 2024-12-04 DEVICE — SPACER 5001122 CONTROL PTC 6 DEG 11X22
Type: IMPLANTABLE DEVICE | Site: SPINE LUMBAR | Status: FUNCTIONAL
Brand: CAPSTONE CONTROL PTC SPINAL SYSTEM

## 2024-12-04 DEVICE — IMP SCR MEDT 5.5/6.0MM SOLERA 6.5X55MM MA 55840006555: Type: IMPLANTABLE DEVICE | Site: SPINE LUMBAR | Status: FUNCTIONAL

## 2024-12-04 DEVICE — 10.5 MM X 100 MM IFUSE BEDROCK GRANITE IMPLANT
Type: IMPLANTABLE DEVICE | Site: SACRUM | Status: FUNCTIONAL
Brand: IFUSE GRANITE IMPLANT SYSTEM

## 2024-12-04 DEVICE — IMPLANTABLE DEVICE: Type: IMPLANTABLE DEVICE | Site: SPINE LUMBAR | Status: FUNCTIONAL

## 2024-12-04 DEVICE — IMP SCR MEDT 5.5/6.0MM SOLERA 7.5X50MM MA 55840007550: Type: IMPLANTABLE DEVICE | Site: SPINE LUMBAR | Status: FUNCTIONAL

## 2024-12-04 DEVICE — IMP SCR MEDT 5.5/6.0MM SOLERA 7.5X55MM MA 55840007555: Type: IMPLANTABLE DEVICE | Site: SPINE LUMBAR | Status: FUNCTIONAL

## 2024-12-04 DEVICE — IMP ROD MEDT SOLERA LINED 5.5X500MM CHR 1555200500: Type: IMPLANTABLE DEVICE | Site: SPINE LUMBAR | Status: FUNCTIONAL

## 2024-12-04 RX ORDER — LISINOPRIL 10 MG/1
10 TABLET ORAL EVERY MORNING
Status: DISCONTINUED | OUTPATIENT
Start: 2024-12-05 | End: 2024-12-09 | Stop reason: HOSPADM

## 2024-12-04 RX ORDER — NOREPINEPHRINE BITARTRATE 0.06 MG/ML
.01-.6 INJECTION, SOLUTION INTRAVENOUS CONTINUOUS
Status: DISCONTINUED | OUTPATIENT
Start: 2024-12-04 | End: 2024-12-04 | Stop reason: HOSPADM

## 2024-12-04 RX ORDER — NALOXONE HYDROCHLORIDE 0.4 MG/ML
0.2 INJECTION, SOLUTION INTRAMUSCULAR; INTRAVENOUS; SUBCUTANEOUS
Status: DISCONTINUED | OUTPATIENT
Start: 2024-12-04 | End: 2024-12-09 | Stop reason: HOSPADM

## 2024-12-04 RX ORDER — VITAMIN B COMPLEX
25 TABLET ORAL DAILY
Status: DISCONTINUED | OUTPATIENT
Start: 2024-12-05 | End: 2024-12-09 | Stop reason: HOSPADM

## 2024-12-04 RX ORDER — NALOXONE HYDROCHLORIDE 0.4 MG/ML
0.1 INJECTION, SOLUTION INTRAMUSCULAR; INTRAVENOUS; SUBCUTANEOUS
Status: DISCONTINUED | OUTPATIENT
Start: 2024-12-04 | End: 2024-12-04 | Stop reason: HOSPADM

## 2024-12-04 RX ORDER — LIDOCAINE 40 MG/G
CREAM TOPICAL
Status: DISCONTINUED | OUTPATIENT
Start: 2024-12-04 | End: 2024-12-04 | Stop reason: HOSPADM

## 2024-12-04 RX ORDER — PROCHLORPERAZINE MALEATE 5 MG/1
5 TABLET ORAL EVERY 6 HOURS PRN
Status: DISCONTINUED | OUTPATIENT
Start: 2024-12-04 | End: 2024-12-09 | Stop reason: HOSPADM

## 2024-12-04 RX ORDER — HYDROMORPHONE HYDROCHLORIDE 1 MG/ML
0.4 INJECTION, SOLUTION INTRAMUSCULAR; INTRAVENOUS; SUBCUTANEOUS EVERY 5 MIN PRN
Status: DISCONTINUED | OUTPATIENT
Start: 2024-12-04 | End: 2024-12-04 | Stop reason: HOSPADM

## 2024-12-04 RX ORDER — OXYCODONE HYDROCHLORIDE 5 MG/1
5 TABLET ORAL EVERY 4 HOURS PRN
Status: DISCONTINUED | OUTPATIENT
Start: 2024-12-04 | End: 2024-12-09 | Stop reason: HOSPADM

## 2024-12-04 RX ORDER — SODIUM CHLORIDE, SODIUM LACTATE, POTASSIUM CHLORIDE, CALCIUM CHLORIDE 600; 310; 30; 20 MG/100ML; MG/100ML; MG/100ML; MG/100ML
INJECTION, SOLUTION INTRAVENOUS CONTINUOUS
Status: DISCONTINUED | OUTPATIENT
Start: 2024-12-04 | End: 2024-12-05

## 2024-12-04 RX ORDER — FAMOTIDINE 20 MG/1
20 TABLET, FILM COATED ORAL 2 TIMES DAILY
Status: DISCONTINUED | OUTPATIENT
Start: 2024-12-04 | End: 2024-12-09 | Stop reason: HOSPADM

## 2024-12-04 RX ORDER — LISINOPRIL 5 MG/1
5 TABLET ORAL EVERY EVENING
Status: DISCONTINUED | OUTPATIENT
Start: 2024-12-04 | End: 2024-12-09 | Stop reason: HOSPADM

## 2024-12-04 RX ORDER — HYDROMORPHONE HYDROCHLORIDE 1 MG/ML
0.4 INJECTION, SOLUTION INTRAMUSCULAR; INTRAVENOUS; SUBCUTANEOUS
Status: DISCONTINUED | OUTPATIENT
Start: 2024-12-04 | End: 2024-12-09 | Stop reason: HOSPADM

## 2024-12-04 RX ORDER — HYDROMORPHONE HYDROCHLORIDE 1 MG/ML
0.2 INJECTION, SOLUTION INTRAMUSCULAR; INTRAVENOUS; SUBCUTANEOUS
Status: DISCONTINUED | OUTPATIENT
Start: 2024-12-04 | End: 2024-12-09 | Stop reason: HOSPADM

## 2024-12-04 RX ORDER — SODIUM CHLORIDE, SODIUM LACTATE, POTASSIUM CHLORIDE, CALCIUM CHLORIDE 600; 310; 30; 20 MG/100ML; MG/100ML; MG/100ML; MG/100ML
INJECTION, SOLUTION INTRAVENOUS CONTINUOUS
Status: DISCONTINUED | OUTPATIENT
Start: 2024-12-04 | End: 2024-12-04 | Stop reason: HOSPADM

## 2024-12-04 RX ORDER — LIDOCAINE HYDROCHLORIDE ANHYDROUS AND DEXTROSE MONOHYDRATE .8; 5 G/100ML; G/100ML
0.5 INJECTION, SOLUTION INTRAVENOUS CONTINUOUS
Status: DISCONTINUED | OUTPATIENT
Start: 2024-12-04 | End: 2024-12-04

## 2024-12-04 RX ORDER — LIDOCAINE HYDROCHLORIDE ANHYDROUS AND DEXTROSE MONOHYDRATE .8; 5 G/100ML; G/100ML
1 INJECTION, SOLUTION INTRAVENOUS CONTINUOUS
Status: DISCONTINUED | OUTPATIENT
Start: 2024-12-04 | End: 2024-12-06

## 2024-12-04 RX ORDER — FOLIC ACID 1 MG/1
1 TABLET ORAL EVERY MORNING
Status: DISCONTINUED | OUTPATIENT
Start: 2024-12-05 | End: 2024-12-09 | Stop reason: HOSPADM

## 2024-12-04 RX ORDER — TIZANIDINE 2 MG/1
2 TABLET ORAL EVERY 8 HOURS PRN
Status: DISCONTINUED | OUTPATIENT
Start: 2024-12-04 | End: 2024-12-09 | Stop reason: HOSPADM

## 2024-12-04 RX ORDER — AMOXICILLIN 250 MG
2 CAPSULE ORAL 2 TIMES DAILY
Status: DISCONTINUED | OUTPATIENT
Start: 2024-12-04 | End: 2024-12-09 | Stop reason: HOSPADM

## 2024-12-04 RX ORDER — ONDANSETRON 4 MG/1
4 TABLET, ORALLY DISINTEGRATING ORAL EVERY 30 MIN PRN
Status: DISCONTINUED | OUTPATIENT
Start: 2024-12-04 | End: 2024-12-04 | Stop reason: HOSPADM

## 2024-12-04 RX ORDER — ACETAMINOPHEN 325 MG/1
650 TABLET ORAL EVERY 4 HOURS PRN
Status: DISCONTINUED | OUTPATIENT
Start: 2024-12-07 | End: 2024-12-09 | Stop reason: HOSPADM

## 2024-12-04 RX ORDER — ACETAMINOPHEN 325 MG/1
975 TABLET ORAL ONCE
Status: COMPLETED | OUTPATIENT
Start: 2024-12-04 | End: 2024-12-04

## 2024-12-04 RX ORDER — ACETAMINOPHEN 325 MG/1
975 TABLET ORAL EVERY 8 HOURS
Status: COMPLETED | OUTPATIENT
Start: 2024-12-04 | End: 2024-12-07

## 2024-12-04 RX ORDER — CALCIUM CITRATE/VITAMIN D3 315MG-6.25
1 TABLET ORAL 2 TIMES DAILY
Status: DISCONTINUED | OUTPATIENT
Start: 2024-12-04 | End: 2024-12-09 | Stop reason: HOSPADM

## 2024-12-04 RX ORDER — HYDROXYZINE HYDROCHLORIDE 10 MG/1
10 TABLET, FILM COATED ORAL EVERY 6 HOURS PRN
Status: DISCONTINUED | OUTPATIENT
Start: 2024-12-04 | End: 2024-12-09 | Stop reason: HOSPADM

## 2024-12-04 RX ORDER — ZINC SULFATE 50(220)MG
220 CAPSULE ORAL DAILY
Status: DISCONTINUED | OUTPATIENT
Start: 2024-12-04 | End: 2024-12-04

## 2024-12-04 RX ORDER — FENTANYL CITRATE 50 UG/ML
25 INJECTION, SOLUTION INTRAMUSCULAR; INTRAVENOUS EVERY 5 MIN PRN
Status: DISCONTINUED | OUTPATIENT
Start: 2024-12-04 | End: 2024-12-04 | Stop reason: HOSPADM

## 2024-12-04 RX ORDER — FENTANYL CITRATE 50 UG/ML
50 INJECTION, SOLUTION INTRAMUSCULAR; INTRAVENOUS EVERY 5 MIN PRN
Status: DISCONTINUED | OUTPATIENT
Start: 2024-12-04 | End: 2024-12-04 | Stop reason: HOSPADM

## 2024-12-04 RX ORDER — NALOXONE HYDROCHLORIDE 0.4 MG/ML
0.4 INJECTION, SOLUTION INTRAMUSCULAR; INTRAVENOUS; SUBCUTANEOUS
Status: DISCONTINUED | OUTPATIENT
Start: 2024-12-04 | End: 2024-12-09 | Stop reason: HOSPADM

## 2024-12-04 RX ORDER — ONDANSETRON 2 MG/ML
4 INJECTION INTRAMUSCULAR; INTRAVENOUS EVERY 30 MIN PRN
Status: DISCONTINUED | OUTPATIENT
Start: 2024-12-04 | End: 2024-12-04 | Stop reason: HOSPADM

## 2024-12-04 RX ORDER — ONDANSETRON 4 MG/1
4 TABLET, ORALLY DISINTEGRATING ORAL EVERY 6 HOURS PRN
Status: DISCONTINUED | OUTPATIENT
Start: 2024-12-04 | End: 2024-12-09 | Stop reason: HOSPADM

## 2024-12-04 RX ORDER — CEFAZOLIN SODIUM/WATER 2 G/20 ML
2 SYRINGE (ML) INTRAVENOUS SEE ADMIN INSTRUCTIONS
Status: DISCONTINUED | OUTPATIENT
Start: 2024-12-04 | End: 2024-12-04 | Stop reason: HOSPADM

## 2024-12-04 RX ORDER — ARGININE/GLUTAMINE/CALCIUM BMB 7G-7G-1.5G
1 POWDER IN PACKET (EA) ORAL 2 TIMES DAILY
Status: DISCONTINUED | OUTPATIENT
Start: 2024-12-04 | End: 2024-12-09 | Stop reason: HOSPADM

## 2024-12-04 RX ORDER — VANCOMYCIN HYDROCHLORIDE 1 G/20ML
INJECTION, POWDER, LYOPHILIZED, FOR SOLUTION INTRAVENOUS PRN
Status: DISCONTINUED | OUTPATIENT
Start: 2024-12-04 | End: 2024-12-04 | Stop reason: HOSPADM

## 2024-12-04 RX ORDER — OXYCODONE HYDROCHLORIDE 10 MG/1
10 TABLET ORAL EVERY 4 HOURS PRN
Status: DISCONTINUED | OUTPATIENT
Start: 2024-12-04 | End: 2024-12-09 | Stop reason: HOSPADM

## 2024-12-04 RX ORDER — CEFAZOLIN SODIUM 2 G/100ML
2 INJECTION, SOLUTION INTRAVENOUS EVERY 8 HOURS
Status: COMPLETED | OUTPATIENT
Start: 2024-12-04 | End: 2024-12-06

## 2024-12-04 RX ORDER — FLUOROMETHOLONE 1 MG/ML
1 SUSPENSION/ DROPS OPHTHALMIC DAILY
Status: DISCONTINUED | OUTPATIENT
Start: 2024-12-05 | End: 2024-12-09 | Stop reason: HOSPADM

## 2024-12-04 RX ORDER — BISACODYL 10 MG
10 SUPPOSITORY, RECTAL RECTAL DAILY PRN
Status: DISCONTINUED | OUTPATIENT
Start: 2024-12-04 | End: 2024-12-09 | Stop reason: HOSPADM

## 2024-12-04 RX ORDER — ROPIVACAINE IN 0.9% SOD CHL/PF 0.1 %
.01-.125 PLASTIC BAG, INJECTION (ML) EPIDURAL CONTINUOUS
Status: DISCONTINUED | OUTPATIENT
Start: 2024-12-04 | End: 2024-12-04

## 2024-12-04 RX ORDER — MAGNESIUM SULFATE HEPTAHYDRATE 40 MG/ML
2 INJECTION, SOLUTION INTRAVENOUS ONCE
Status: COMPLETED | OUTPATIENT
Start: 2024-12-04 | End: 2024-12-04

## 2024-12-04 RX ORDER — HYDROMORPHONE HYDROCHLORIDE 1 MG/ML
0.2 INJECTION, SOLUTION INTRAMUSCULAR; INTRAVENOUS; SUBCUTANEOUS EVERY 5 MIN PRN
Status: DISCONTINUED | OUTPATIENT
Start: 2024-12-04 | End: 2024-12-04 | Stop reason: HOSPADM

## 2024-12-04 RX ORDER — ONDANSETRON 2 MG/ML
4 INJECTION INTRAMUSCULAR; INTRAVENOUS EVERY 6 HOURS PRN
Status: DISCONTINUED | OUTPATIENT
Start: 2024-12-04 | End: 2024-12-09 | Stop reason: HOSPADM

## 2024-12-04 RX ORDER — POLYETHYLENE GLYCOL 3350 17 G/17G
17 POWDER, FOR SOLUTION ORAL 2 TIMES DAILY
Status: DISCONTINUED | OUTPATIENT
Start: 2024-12-04 | End: 2024-12-09 | Stop reason: HOSPADM

## 2024-12-04 RX ORDER — LISINOPRIL 10 MG/1
5-10 TABLET ORAL EVERY EVENING
Status: ON HOLD | COMMUNITY
End: 2024-12-07

## 2024-12-04 RX ORDER — LISINOPRIL 10 MG/1
10 TABLET ORAL EVERY MORNING
COMMUNITY

## 2024-12-04 RX ORDER — NIACINAMIDE 500 MG
500 TABLET ORAL DAILY
Status: DISCONTINUED | OUTPATIENT
Start: 2024-12-05 | End: 2024-12-09 | Stop reason: HOSPADM

## 2024-12-04 RX ORDER — LIDOCAINE 40 MG/G
CREAM TOPICAL
Status: DISCONTINUED | OUTPATIENT
Start: 2024-12-04 | End: 2024-12-09 | Stop reason: HOSPADM

## 2024-12-04 RX ORDER — CEFAZOLIN SODIUM/WATER 2 G/20 ML
2 SYRINGE (ML) INTRAVENOUS
Status: COMPLETED | OUTPATIENT
Start: 2024-12-04 | End: 2024-12-04

## 2024-12-04 RX ORDER — KETOROLAC TROMETHAMINE 15 MG/ML
15 INJECTION, SOLUTION INTRAMUSCULAR; INTRAVENOUS EVERY 6 HOURS
Status: DISPENSED | OUTPATIENT
Start: 2024-12-04 | End: 2024-12-05

## 2024-12-04 RX ADMIN — BRINZOLAMIDE/BRIMONIDINE TARTRATE 1 DROP: 10; 2 SUSPENSION/ DROPS OPHTHALMIC at 21:34

## 2024-12-04 RX ADMIN — SODIUM CHLORIDE, POTASSIUM CHLORIDE, SODIUM LACTATE AND CALCIUM CHLORIDE: 600; 310; 30; 20 INJECTION, SOLUTION INTRAVENOUS at 23:41

## 2024-12-04 RX ADMIN — FENTANYL CITRATE 50 MCG: 50 INJECTION INTRAMUSCULAR; INTRAVENOUS at 17:22

## 2024-12-04 RX ADMIN — FENTANYL CITRATE 50 MCG: 50 INJECTION INTRAMUSCULAR; INTRAVENOUS at 16:58

## 2024-12-04 RX ADMIN — Medication 1 TABLET: at 21:01

## 2024-12-04 RX ADMIN — OXYCODONE HYDROCHLORIDE 10 MG: 10 TABLET ORAL at 21:00

## 2024-12-04 RX ADMIN — Medication 2 G: at 21:49

## 2024-12-04 RX ADMIN — ACETAMINOPHEN 975 MG: 325 TABLET, FILM COATED ORAL at 21:01

## 2024-12-04 RX ADMIN — ACETAMINOPHEN 975 MG: 325 TABLET, FILM COATED ORAL at 06:33

## 2024-12-04 RX ADMIN — KETOROLAC TROMETHAMINE 15 MG: 15 INJECTION, SOLUTION INTRAMUSCULAR; INTRAVENOUS at 21:04

## 2024-12-04 RX ADMIN — FAMOTIDINE 20 MG: 20 TABLET ORAL at 21:13

## 2024-12-04 ASSESSMENT — ACTIVITIES OF DAILY LIVING (ADL)
ADLS_ACUITY_SCORE: 15
ADLS_ACUITY_SCORE: 15
ADLS_ACUITY_SCORE: 19
ADLS_ACUITY_SCORE: 15
ADLS_ACUITY_SCORE: 19
ADLS_ACUITY_SCORE: 15
ADLS_ACUITY_SCORE: 19
ADLS_ACUITY_SCORE: 15
ADLS_ACUITY_SCORE: 15
ADLS_ACUITY_SCORE: 16
ADLS_ACUITY_SCORE: 16
ADLS_ACUITY_SCORE: 15
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
## 2024-12-04 NOTE — BRIEF OP NOTE
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.36]  Post-operative diagnosis Same as pre-operative diagnosis    Procedure: Posterior Instrumented Spinal Fusion Lumbar 1 or 2 to Pelvis; Transforaminal Lumbar Interbody Fusion with Castaneda-Gipsno Osteotomy Lumbar 2-Sacral 1 (4 levels); Possible Cement Augmentation of Screws; Use of Infuse Bone Morphogenetic Protein Large kit and Allograft, N/A - Spine    Surgeon: Surgeons and Role:     * Chin Pedroza MD - Primary     * Nasim Song MD - Assisting  Anesthesia: General   Estimated Blood Loss: 850 mL, 415 back via cell saver    Drains: Hemovac  Specimens: * No specimens in log *  Findings:   None.  Complications: None.  Implants: * No implants in log *    70 yo M with PMH HTN, acquired right foot drop, history of avascular necrosis R hip s/p L1-pelvis fusion with TLIF/SPO L2-S1 12/4/2024 by Dr. Pedroza    Plan:  Orthopedic Primary  Activity: Up with assist until independent. No excessive bending or twisting. No lifting >10 lbs x 6 weeks. Encourage ambulation. If Dorina lift needed, use high back sling to avoid spine flexion. Patient is falls risk, history of syncope and fluid sensitivity.   Weight bearing status: WBAT.  Pain management:   IV lidocaine: discontinue at 8am on POD#2 or earlier if complications arise.  Oxycodone PO, IV dilaudid prn, APAP, muscle relaxants. Transition from IV to PO narcotics as tolerated. Toradol x 24 hr then no NSAIDs    Antibiotics: Antibiotics x 48 hr  Diet: Begin with clear fluids and progress diet as tolerated. Lennox BID. Protein shakes BID.  DVT prophylaxis: SCDs only. No chemical DVT ppx needed.  Imaging:  XR Upright scoli AP/lat PTDC - ordered.  Labs: Hgb POD# 1, 2  Bracing/Splinting: [place] OOB. Ok to mobilize and work with therapy prior to brace arriving. .  Dressings: Keep dressing c/d/i x 7 days  Drains: deep and superficial hemovac. Document output per shift, will be discontinued at Orthopedic Surgery discretion.  Azevedo catheter: Remove POD#1    Physical Therapy/Occupational Therapy: Eval and treat.  Cultures: none.    Consults: Hospitalist.  Follow-up: Clinic with Dr. Pedroza in 6 weeks with repeat x-rays.   Disposition: Pending progress with therapies, pain control on orals, and medical stability, anticipate discharge to home on POD #5-7.    Indication For Assistant:   The procedure was medically necessary for an assistant. I provided hemostasis, assisted with instrumentation, and provided exposure, retraction, and skin closure.  The assistance that I provided reduced operative time which meant less general anesthetic for the patient.    Brigette Daniel PA-C  12/4/2024 7:55 AM  Orthopaedic Surgery     Thank you for allowing me to participate in this patient's care. Please page me directly any questions/concerns. If no response, if on a weekend, or after 5 pm, please page orthopedic surgery resident on call.   Text page via [facility] Paging/Directory
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
## 2024-12-05 ENCOUNTER — APPOINTMENT (OUTPATIENT)
Dept: PHYSICAL THERAPY | Facility: CLINIC | Age: 69
DRG: 427 | End: 2024-12-05
Attending: ORTHOPAEDIC SURGERY
Payer: MEDICARE

## 2024-12-05 ENCOUNTER — APPOINTMENT (OUTPATIENT)
Dept: OCCUPATIONAL THERAPY | Facility: CLINIC | Age: 69
DRG: 427 | End: 2024-12-05
Attending: ORTHOPAEDIC SURGERY
Payer: MEDICARE

## 2024-12-05 VITALS
WEIGHT: 189.82 LBS | OXYGEN SATURATION: 92 % | RESPIRATION RATE: 17 BRPM | TEMPERATURE: 99.3 F | DIASTOLIC BLOOD PRESSURE: 70 MMHG | HEIGHT: 73 IN | SYSTOLIC BLOOD PRESSURE: 142 MMHG | BODY MASS INDEX: 25.16 KG/M2 | HEART RATE: 88 BPM

## 2024-12-05 LAB
GLUCOSE BLDC GLUCOMTR-MCNC: 96 MG/DL (ref 70–99)
HGB BLD-MCNC: 10.6 G/DL (ref 13.3–17.7)
HOLD SPECIMEN: NORMAL

## 2024-12-05 PROCEDURE — 97530 THERAPEUTIC ACTIVITIES: CPT | Mod: GP

## 2024-12-05 PROCEDURE — 250N000013 HC RX MED GY IP 250 OP 250 PS 637

## 2024-12-05 PROCEDURE — 250N000013 HC RX MED GY IP 250 OP 250 PS 637: Performed by: PHYSICIAN ASSISTANT

## 2024-12-05 PROCEDURE — 36415 COLL VENOUS BLD VENIPUNCTURE: CPT | Performed by: PHYSICIAN ASSISTANT

## 2024-12-05 PROCEDURE — 250N000011 HC RX IP 250 OP 636: Performed by: PHYSICIAN ASSISTANT

## 2024-12-05 PROCEDURE — 99232 SBSQ HOSP IP/OBS MODERATE 35: CPT | Performed by: INTERNAL MEDICINE

## 2024-12-05 PROCEDURE — 85018 HEMOGLOBIN: CPT | Performed by: PHYSICIAN ASSISTANT

## 2024-12-05 PROCEDURE — 97530 THERAPEUTIC ACTIVITIES: CPT | Mod: GO

## 2024-12-05 PROCEDURE — 97161 PT EVAL LOW COMPLEX 20 MIN: CPT | Mod: GP

## 2024-12-05 PROCEDURE — 120N000002 HC R&B MED SURG/OB UMMC

## 2024-12-05 PROCEDURE — 97165 OT EVAL LOW COMPLEX 30 MIN: CPT | Mod: GO

## 2024-12-05 RX ADMIN — Medication 1 TABLET: at 08:59

## 2024-12-05 RX ADMIN — Medication 1 PACKET: at 19:54

## 2024-12-05 RX ADMIN — Medication 1 PACKET: at 15:53

## 2024-12-05 RX ADMIN — Medication 500 MG: at 08:59

## 2024-12-05 RX ADMIN — LIDOCAINE HYDROCHLORIDE 1 MG/KG/HR: 8 INJECTION, SOLUTION INTRAVENOUS at 14:46

## 2024-12-05 RX ADMIN — KETOROLAC TROMETHAMINE 15 MG: 15 INJECTION, SOLUTION INTRAMUSCULAR; INTRAVENOUS at 08:57

## 2024-12-05 RX ADMIN — LISINOPRIL 5 MG: 5 TABLET ORAL at 19:50

## 2024-12-05 RX ADMIN — FLUOROMETHOLONE 1 DROP: 1 SOLUTION/ DROPS OPHTHALMIC at 08:56

## 2024-12-05 RX ADMIN — FAMOTIDINE 20 MG: 20 TABLET ORAL at 09:00

## 2024-12-05 RX ADMIN — Medication 25 MCG: at 08:59

## 2024-12-05 RX ADMIN — FAMOTIDINE 20 MG: 20 TABLET ORAL at 19:50

## 2024-12-05 RX ADMIN — FOLIC ACID 1 MG: 1 TABLET ORAL at 08:59

## 2024-12-05 RX ADMIN — Medication 2 G: at 04:31

## 2024-12-05 RX ADMIN — OXYCODONE HYDROCHLORIDE 10 MG: 10 TABLET ORAL at 13:03

## 2024-12-05 RX ADMIN — POLYETHYLENE GLYCOL 3350 17 G: 17 POWDER, FOR SOLUTION ORAL at 08:59

## 2024-12-05 RX ADMIN — Medication 2 G: at 19:50

## 2024-12-05 RX ADMIN — OXYCODONE HYDROCHLORIDE 10 MG: 10 TABLET ORAL at 17:30

## 2024-12-05 RX ADMIN — BRINZOLAMIDE/BRIMONIDINE TARTRATE 1 DROP: 10; 2 SUSPENSION/ DROPS OPHTHALMIC at 19:49

## 2024-12-05 RX ADMIN — ACETAMINOPHEN 975 MG: 325 TABLET, FILM COATED ORAL at 19:50

## 2024-12-05 RX ADMIN — Medication 1 TABLET: at 19:49

## 2024-12-05 RX ADMIN — SENNOSIDES AND DOCUSATE SODIUM 2 TABLET: 50; 8.6 TABLET ORAL at 19:50

## 2024-12-05 RX ADMIN — LISINOPRIL 10 MG: 10 TABLET ORAL at 08:59

## 2024-12-05 RX ADMIN — BRINZOLAMIDE/BRIMONIDINE TARTRATE 1 DROP: 10; 2 SUSPENSION/ DROPS OPHTHALMIC at 08:55

## 2024-12-05 RX ADMIN — POLYETHYLENE GLYCOL 3350 17 G: 17 POWDER, FOR SOLUTION ORAL at 19:50

## 2024-12-05 RX ADMIN — Medication 2 G: at 12:18

## 2024-12-05 RX ADMIN — ACETAMINOPHEN 975 MG: 325 TABLET, FILM COATED ORAL at 04:31

## 2024-12-05 RX ADMIN — OXYCODONE HYDROCHLORIDE 10 MG: 10 TABLET ORAL at 08:59

## 2024-12-05 RX ADMIN — SENNOSIDES AND DOCUSATE SODIUM 2 TABLET: 50; 8.6 TABLET ORAL at 09:00

## 2024-12-05 RX ADMIN — ACETAMINOPHEN 975 MG: 325 TABLET, FILM COATED ORAL at 12:18

## 2024-12-05 RX ADMIN — KETOROLAC TROMETHAMINE 15 MG: 15 INJECTION, SOLUTION INTRAMUSCULAR; INTRAVENOUS at 03:02

## 2024-12-05 ASSESSMENT — ACTIVITIES OF DAILY LIVING (ADL)
ADLS_ACUITY_SCORE: 19

## 2024-12-05 NOTE — PLAN OF CARE
"Goal Outcome Evaluation:    2962-6504  /72 (BP Location: Left arm, Patient Position: Right side, Cuff Size: Adult Regular)   Pulse 74   Temp 98.4  F (36.9  C) (Oral)   Resp 18   Ht 1.854 m (6' 1\")   Wt 86.1 kg (189 lb 13.1 oz)   SpO2 98%   BMI 25.04 kg/m     Alert,well orientated.  LS clear.Capno on,oxygen via NC at 2lpm.Using IS as instructed.  Aquacel to back,CDI.  Hemovac emptied 110ml,dark reddish.Hemoglobin recheck this morning.  Denies any numbness/tingling sensation.  Pain managed with lidocaine drip at 10 ml/hr,scheduled toradol and tylenol.Ice pack refreshed.Rating pain at 5/10 overnight.Declined other pain meds as offered.Wanting to reserve oxycodone when with therapy.  PIV line R wrist with LR at 85ml/hr and lidocaine drip.  PIV line L wrist,saline locked.  On IV abx.  Azevedo is patent.Not passing gas yet.Reports last BM yesterday morning.  PCDs are on.  POD1 BG 96.  Makes needs known.  Call light near reach.    Plan:  Hgb recheck.  Azevedo removal when up with therapy.  Premedicate prior to working with therapy per pt's request.  Orthotics for J brace.                              "

## 2024-12-05 NOTE — PLAN OF CARE
"[id number]  Goal Outcome Evaluation:        Plan of Care Reviewed With: patient    Problem: Adult Inpatient Plan of Care  Goal: Plan of Care Review  Description: The Plan of Care Review/Shift note should be completed every shift.  The Outcome Evaluation is a brief statement about your assessment that the patient is improving, declining, or no change.  This information will be displayed automatically on your shift  note.  Outcome: Progressing  Flowsheets (Taken 12/4/2024 2318)  Plan of Care Reviewed With: patient  Goal: Patient-Specific Goal (Individualized)  Description: You can add care plan individualizations to a care plan. Examples of Individualization might be:  \"Parent requests to be called daily at 9am for status\", \"I have a hard time hearing out of my right ear\", or \"Do not touch me to wake me up as it startles  me\".  Outcome: Progressing  Goal: Absence of Hospital-Acquired Illness or Injury  Outcome: Progressing  Goal: Optimal Comfort and Wellbeing  Outcome: Progressing  Goal: Readiness for Transition of Care  Outcome: Progressing     Problem: Spinal Surgery  Goal: Optimal Coping with Surgery  Outcome: Progressing  Goal: Absence of Bleeding  Outcome: Progressing  Goal: Effective Bowel Elimination  Outcome: Progressing  Goal: Fluid and Electrolyte Balance  Outcome: Progressing  Goal: Optimal Functional Ability  Outcome: Progressing  Goal: Absence of Infection Signs and Symptoms  Outcome: Progressing  Goal: Optimal Neurologic Function  Outcome: Progressing  Goal: Anesthesia/Sedation Recovery  Outcome: Progressing  Goal: Optimal Pain Control and Function  Outcome: Progressing  Goal: Nausea and Vomiting Relief  Outcome: Progressing  Goal: Effective Urinary Elimination  Outcome: Progressing  Goal: Effective Oxygenation and Ventilation  Outcome: Progressing     Problem: Infection  Goal: Absence of Infection Signs and Symptoms  Outcome: Progressing                    "

## 2024-12-05 NOTE — PROGRESS NOTES
12/05/24 1317   Appointment Info   Signing Clinician's Name / Credentials (OT) Rafael Seo, OTR/L   Rehab Comments (OT) spinal precautions   Living Environment   People in Home significant other   Current Living Arrangements house   Home Accessibility stairs to enter home   Number of Stairs, Main Entrance 2   Stair Railings, Main Entrance railings safe and in good condition;railings on both sides of stairs   Transportation Anticipated family or friend will provide   Living Environment Comments Tub shower w/o grab bars   Self-Care   Usual Activity Tolerance good   Current Activity Tolerance fair   Regular Exercise Yes   Exercise Amount/Frequency other (see comments)  (6x/week)   Equipment Currently Used at Home cane, quad   Fall history within last six months no   Activity/Exercise/Self-Care Comment Independent at baseline. Uses cane in the morning   General Information   Onset of Illness/Injury or Date of Surgery 12/04/24   Referring Physician Brigette Daniel PA-C   Patient/Family Therapy Goal Statement (OT) Get better and go home   Additional Occupational Profile Info/Pertinent History of Current Problem Kar López is a 69 year old male admitted on 12/4/2024. He has a history of hypertension, acquired R foot drop, hx of avascular necrosis R hip, chronic pain, DJD. Posterior instrumented spinal fusion L1 or 2 to pelvis, fusion L2 - S1 w/ Dr Pedroza. Medicine consulted for medical co-management.   Existing Precautions/Restrictions fall;spinal   Left Upper Extremity (Weight-bearing Status) partial weight-bearing (PWB)   Right Upper Extremity (Weight-bearing Status) partial weight-bearing (PWB)   Left Lower Extremity (Weight-bearing Status) weight-bearing as tolerated (WBAT)   Right Lower Extremity (Weight-bearing Status) weight-bearing as tolerated (WBAT)   General Observations and Info Activity: ambulate   Cognitive Status Examination   Orientation Status orientation to person, place and time   Affect/Mental  Status (Cognitive) WNL   Pain Assessment   Patient Currently in Pain Yes, see Vital Sign flowsheet   Range of Motion Comprehensive   Comment, General Range of Motion impaired functional ROM for ADL 2/2 procedure and precautions.   Strength Comprehensive (MMT)   Comment, General Manual Muscle Testing (MMT) Assessment Not formally assessed 2/2 post op precautions   Bed Mobility   Bed Mobility supine-sit;sit-supine   Supine-Sit Sargent (Bed Mobility) minimum assist (75% patient effort)   Sit-Supine Sargent (Bed Mobility) minimum assist (75% patient effort)   Transfers   Transfers sit-stand transfer   Sit-Stand Transfer   Sit-Stand Sargent (Transfers) contact guard   Activities of Daily Living   BADL Assessment/Intervention lower body dressing;bathing   Bathing Assessment/Intervention   Sargent Level (Bathing) moderate assist (50% patient effort)   Lower Body Dressing Assessment/Training   Sargent Level (Lower Body Dressing) moderate assist (50% patient effort)   Clinical Impression   Criteria for Skilled Therapeutic Interventions Met (OT) Yes, treatment indicated   OT Diagnosis impaired ADL   OT Problem List-Impairments impacting ADL activity tolerance impaired;pain;post-surgical precautions   Assessment of Occupational Performance 3-5 Performance Deficits   Identified Performance Deficits dressing, bathing, transfers, toileting   Planned Therapy Interventions (OT) ADL retraining;transfer training   Clinical Decision Making Complexity (OT) problem focused assessment/low complexity   Risk & Benefits of therapy have been explained evaluation/treatment results reviewed;care plan/treatment goals reviewed;risks/benefits reviewed;current/potential barriers reviewed;participants voiced agreement with care plan;participants included;patient;spouse/significant other   Clinical Impression Comments Pt with impaired ADL 2/2 post op precautions, pain, and activity tolerance. Pt to benefit from skilled OT to  address and maximize safety and I with ADL.   OT Total Evaluation Time   OT Eval, Low Complexity Minutes (74770) 8   OT Goals   Therapy Frequency (OT) Daily   OT Predicted Duration/Target Date for Goal Attainment 24   OT Goals Lower Body Dressing;Toilet Transfer/Toileting;OT Goal 1   OT: Lower Body Dressing Modified independent;within precautions   OT: Toilet Transfer/Toileting Modified independent;within precautions   OT: Goal 1 Pt will complete tub transfer with CGA within precuations   Therapeutic Activities   Therapeutic Activity Minutes (97406) 24   Symptoms noted during/after treatment increased pain;fatigue   Treatment Detail/Skilled Intervention Patient is supine at encounter, agreeable to OT. s/o present to assist throughout session. Dependent for donning shoes to prep for functional mobility. Re-educated on spinal precautions and informed pt on impact on ADLs such as dressing and bathing. Edu on log roll tech and pt transfers supine > sitting EOB w/ moderate cues for log roll and min A. Seated EOB SBA. BP taken supine: 136/68, and sittin/80. Denies dizziness at EOB. STS w/ FWW and CGA, cues to push from EOB. Sitting back to EOB CGA due to need for adjusting brace. dependent for donning brace and cues for brace fit. STS again, CGA w/ FWW, cues to push from EOB. Standing w/ FWW and pt denies dizziness. s/o provides chair follow as pt pivot and ambulates in room w/ FWW, ambulating ~25' total w/ FWW and CGA. Sitting back to EOB w/ cues to reach back and control descent. Min A and cues for leslie roll to transfer supine. Positioned pt w/ all needs in reach. /64 after activity.   OT Discharge Planning   OT Plan toilet transfer/toileting, LB dress w/ AE   OT Discharge Recommendation (DC Rec) home with assist   OT Rationale for DC Rec Patient presents below baseline with pain and post surgical precautions. Anticipate patient will progress to supervision - modified independent level for ADLs and be  appropriate for return home. Recommend assist from s/o for all heavier IADLs and assist as needed for ADLs.

## 2024-12-05 NOTE — PROGRESS NOTES
Orthopaedic Surgery Progress Note 12/04/2024    S: No acute events overnight.  Pain controlled on IV/Oral meds, on ketamine/lidocaine. Denies numbness or tingling in BLE. Denies fevers, chills, chest pain, SOB, new N/T, changes in vision.  Tolerating oral diet. -BM, +flatus. Voiding via donohue.  Has not been OOB or worked with therapy.     O:  Temp: 98.3  F (36.8  C) Temp src: Oral BP: 112/84 Pulse: 87   Resp: 14 SpO2: 98 % O2 Device: Nasal cannula Oxygen Delivery: 6 LPM    Exam:  Gen: No acute distress, resting comfortably in bed.  Resp: Non-labored breathing  MSK:  Spine:  - Dressings c/d/i  - Drain patent with serosanguinous output  - DP pulses 2+ bilaterally, feet wwp bilaterally     Lumbar Spine:    Motor -     L2-3: Hip flexion R 5/5  And L 5/5 strength          L3/4:  Knee extension R 5/5 and L 5/5 strength         L4/5:  Foot dorsiflexion R 2/5 L 5/5 and       EHL dorsiflexion R 2/5 L 5/5 strength         S1:  Plantarflexion/Peroneal Muscles  R 5/5 and L 5/5 strength    Sensation: intact to light touch L3-S1 distribution BLE        Drain output: NR/125/185.    Recent Labs   Lab 12/04/24  1311 12/04/24  1215 12/04/24  1107   HGB 11.3* 11.7* 12.7*     Erythrocyte Sedimentation Rate   Date Value Ref Range Status   03/07/2024 3 0 - 20 mm/hr Final         Culture results: n/a    Assessment: 68 yo M with PMH HTN, acquired right foot drop, history of avascular necrosis R hip s/p L1-pelvis fusion with TLIF/SPO L2-S1 12/4/2024 by Dr. Pedroza    Today:   - Remove donohue  - Pain control, wean IV as able  - Mobilize with therapy. Ok to mobilize and work with therapy prior to brace arriving.   - Follow drain output     Plan:  Orthopedic Primary  Activity: Up with assist until independent. No excessive bending or twisting. No lifting >10 lbs x 6 weeks. Encourage ambulation. If Dorina lift needed, use high back sling to avoid spine flexion. Patient is falls risk, history of syncope and fluid sensitivity.   Weight bearing  status: WBAT.  Pain management:   IV lidocaine: discontinue at 8am on POD#2 or earlier if complications arise.  Oxycodone PO, IV dilaudid prn, APAP, muscle relaxants. Transition from IV to PO narcotics as tolerated. Toradol x 24 hr then no NSAIDs    Antibiotics: Antibiotics x 48 hr  Diet: Begin with clear fluids and progress diet as tolerated. Lennox BID. Protein shakes BID.  DVT prophylaxis: SCDs only. No chemical DVT ppx needed.  Imaging: XR Upright scoli AP/lat PTDC - ordered.  Labs: Hgb POD# 1, 2  Bracing/Splinting: Martin OOB. Ok to mobilize and work with therapy prior to brace arriving. .  Dressings: Keep dressing c/d/i x 7 days  Drains: deep and superficial hemovac. Document output per shift, will be discontinued at Orthopedic Surgery discretion.  Azevedo catheter: Remove POD#1    Physical Therapy/Occupational Therapy: Eval and treat.  Cultures: none.    Consults: Hospitalist.  Follow-up: Clinic with Dr. Pedroza in 6 weeks with repeat x-rays.   Disposition: Pending progress with therapies, pain control on orals, and medical stability, anticipate discharge to home on POD #5-7.    Future Appointments   Date Time Provider Department Center   12/5/2024  9:15 AM Sandy Ram, PT [facility] [place]   12/5/2024 10:00 AM Rafael Seo, OTR [facility] [place]   1/16/2025  2:20 PM Chin Pedroza MD [facility]       Patient discussed with Dr. Pedroza.        --  Melissa Hemphill MD  Orthopedic Surgery PGY-4

## 2024-12-05 NOTE — PROGRESS NOTES
[facility]    Medicine Progress Note - Hospitalist Service, GOLD TEAM 20    Date of Admission:  12/4/2024    Assessment & Plan   Kar López is a 69 year old male admitted on 12/4/2024. He has a history of hypertension, acquired R foot drop, hx of avascular necrosis R hip, chronic pain, DJD. Posterior instrumented spinal fusion L1 or 2 to pelvis, fusion L2 - S1 w/ Dr Pedroza. Medicine consulted for medical co-management.     S/p L1-pelvis fusion with TLIF/SPO L2-S1 (12/4/24)  Hx of lumbosacral spondylosis without myelopathy  Hx of chronic back pain w/ b/l leg radiculopathy. 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   - Up with assist until independent. No excessive bending or twisting. No lifting >10 lbs x 6 weeks  - Pain management:   - PO oxycodone prn   - IV dilaudid prn   - APAP 975 mg TID   - Toradol x 24 hours and then no NSAIDs  - Ancef x 48 hours  - Clear liquid, ADAT  - GERD ppx: pepcid bid   - Bowel regimen: senna and miralax scheduled   - Kathleen brace when OOB  - Drains: deep and superficial hemovac  - PT/OT  - Post op Hgb at 10.6, reflecting acute blood loss anemia. No transfusion necessary     Hypertension  - continue pta lisinopril 10 mg qAM, takes either 5 or 10 mg in the evening depending on his blood pressures.   Schedule 5 mg in the evening for now and can adjust pending blood pressures.    Patient did have hypotension this morning after receiving BP meds, but has been asymptomatic,   Will continue to monitor and order bolus fluids of persistently low       Glaucoma  S/p cataract surgery R eye   - continue pta Simbrinza and liquifilm eye drops     S/p total hip arthroplasty 2013: denies issues            Diet: Advance Diet as Tolerated: Regular Diet Adult  Snacks/Supplements Adult: Ensure Max Protein (bariatric); Between Meals    DVT  "Prophylaxis: Pneumatic Compression Devices  Azevedo Catheter: PRESENT, indication: ?  (Error. Value could not be saved.), Surgical procedure  Lines: None     Cardiac Monitoring: None  Code Status:  Full     Clinically Significant Risk Factors            # Hypercalcemia: Highest iCa = 5.5 mg/dL in last 2 days, will monitor as appropriate        # Hypertension: Noted on problem list            # Overweight: Estimated body mass index is 25.04 kg/m  as calculated from the following:    Height as of this encounter: 1.854 m (6' 1\").    Weight as of this encounter: 86.1 kg (189 lb 13.1 oz)., PRESENT ON ADMISSION            Social Drivers of Health            Disposition Plan     Medically Ready for Discharge: Anticipated in 2-4 Days             Alex Ribeiro MD  Hospitalist Service, GOLD TEAM 20  [facility]  Securely message with [email or web address] (more info)  Text page via [facility] Paging/Directory   See signed in provider for up to date coverage information  ______________________________________________________________________    Interval History    Charts reviewed and patient was examined. Patient feels well, and his pain.  No new tingling numbness or weakness in his lower extremities or upper extremities.  He is eating and drinking well.  He is passing gas.  He denies any chest pain, shortness of breath.    Physical Exam   Vital Signs: Temp: 98.4  F (36.9  C) Temp src: Oral BP: (!) 80/43 Pulse: 74   Resp: 18 SpO2: 98 % O2 Device: Nasal cannula Oxygen Delivery: 2 LPM  Weight: 189 lbs 13.06 oz    General Appearance: Awake, alert and not in distress  Respiratory: Clear breath sounds bilaterally   Cardiovascular: Normal heart sounds. No murmurs   GI: Soft, non tender. Normal bowel sounds   Skin: No bruising or bleeding   Other:Awake, alert and orientated X 3       Medical Decision Making       38  MINUTES SPENT BY ME on the date of service doing chart review, history, exam, " documentation & further activities per the note.  MANAGEMENT DISCUSSED with the following over the past 24 hours: Patient, bedside RN, care managements and primary team        Data

## 2024-12-05 NOTE — PROGRESS NOTES
S: Patient seen today at  for an eval for a Kathleen Hyperextension brace as ordered by     O/G: reduce spinal flexion    A: The patient was fit with a thoracolumbosacral anterior hyperextension orthosis Kathleen size Large short.  Multiple adjustments made to customize the fit of the brace on the patient. The pt was instructed on donning/doffing and proper care of the orthisis was explained.  The fitting of the orthosis was fit per manufactures recommendations.  Upon completion of initial fitting, pt had no complaints and the fitting appears to be satisfactory at this time.    P: I have instructed pt/staff to contact our facility with any questions and/or concerns.   MARISELA Rivera.

## 2024-12-05 NOTE — PHARMACY-ADMISSION MEDICATION HISTORY
Pharmacist Admission Medication History    Admission medication history is complete. The information provided in this note is only as accurate as the sources available at the time of the update.    Information Source(s): Patient, Family member, and CareEverywhere/SureScripts via phone    Pertinent Information: Patient takes lisinopril 10mg in the morning and then 5-10mg in the evening based on blood pressure as the patient does not like how it feels when the blood pressure drops too low.     Changes made to PTA medication list:  Added: None  Deleted: None  Changed:   Amoxicillin (fixed sig)  Lisinopril (updated to reflect how the patient is using)  Meloxicam (fixed sig)    Allergies reviewed with patient and updates made in EHR: yes    Medication History Completed By: Espinoza Noriega RPH 12/4/2024 7:06 PM    PTA Med List   Medication Sig Last Dose/Taking    amoxicillin (AMOXIL) 500 MG capsule Take 4 capsules (2000mg) by mouth 1 hour prior to dental appointment More than a month    calcium citrate-vitamin D (CALCIUM CITRATE-VITAMIN D3) 315-6.25 MG-MCG TABS per tablet Take by mouth 2 times daily. 12/3/2024 Evening    cholecalciferol, vitamin D3, (VITAMIN D3) 25 mcg (1,000 unit) capsule [CHOLECALCIFEROL, VITAMIN D3, (VITAMIN D3) 25 MCG (1,000 UNIT) CAPSULE] Take 1,000 Units by mouth daily. Past Week Morning    DOXYCYCLINE PO Take by mouth daily as needed (tick bite) More than a month    fish oil-omega-3 fatty acids (FISH OIL) 300-1,000 mg capsule Take 1 g by mouth 3 times daily. Past Week    fluorometholone (FML LIQUIFILM) 0.1 % ophthalmic suspension Place 1 drop Into the left eye daily. 12/4/2024 Morning    folic acid (FOLVITE) 1 MG tablet Take 1 mg by mouth every morning. Past Week    ibuprofen (ADVIL,MOTRIN) 200 MG tablet Take 200 mg by mouth every 6 hours as needed 11/30/2024    lisinopril (ZESTRIL) 10 MG tablet Take 10 mg by mouth every morning. Take 10mg in the morning and 5-10mg in the evening 12/3/2024  Morning    lisinopril (ZESTRIL) 10 MG tablet Take 5-10 mg by mouth every evening. Take 10mg in the morning and 5-10mg in the evening 12/3/2024 Evening    LUTEIN PO Take 1 tablet by mouth every evening. Past Week Morning    meloxicam (MOBIC) 7.5 MG tablet Take 1 tablet by mouth 2 times daily as needed for pain. More than a month    Multiple Vitamin (MULTIVITAMIN ADULT PO) Take 1 capsule by mouth daily Past Week Morning    niacinamide 500 MG tablet Take 500 mg by mouth daily Past Week Morning    SIMBRINZA 1-0.2 % ophthalmic suspension SHAKE LIQUID AND INSTILL 1 DROP IN LEFT EYE TWICE DAILY 12/4/2024 Morning    study - chlorhexidine gluconate, IDS# 6242, solution Use mouthwash after brushing your teeth, twice daily for 5 days before surgery. Use the cap to measure 15ml or fill the cap to the 'fill line'. Swish mouthwash in your mouth for at least 30 seconds. Spit out. Do not rinse or drink for 1-2 hours after use. 12/3/2024 Bedtime    study - chlorhexidine, IDS# 6242, 4% soap 5 days pre-op Rinse the body with water. Apply soap from the neck to the toes and avoid contact with the eyes, ears, nose, mouth, and genitals. Leave for 1 minute then rinse body again. Use once daily for 5 days. 12/4/2024 Morning    study - mupirocin, IDS# 6242, nasal ointment Apply half pea-sized amount on the sterile cotton-tipped swab and rub around the inside of one nostril. Repeat with a fresh swab into the other nostril. Use twice daily inside both nostrils for 5 days. 12/3/2024 Bedtime    tiZANidine (ZANAFLEX) 2 MG tablet Take 2-4 mg by mouth nightly as needed More than a month    Turmeric 500 MG CAPS Take 1,000 mg by mouth daily Past Week Morning    Zinc 25 MG TABS Take 1 tablet by mouth daily. Past Week Morning

## 2024-12-05 NOTE — PROGRESS NOTES
[id number]  Pt A&OX4 Pt not oob and turn on L side. Co pain 6-7/10 managed using PRN oxycodone and scheduled Tylenol and Torodol. Hemovacc drainaged 70 mL bright red Azevedo catheter in placed. No BM during this shift. Mood neutral. Call light within pt reach. Pt can express his concern properly.
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
## 2024-12-05 NOTE — PROGRESS NOTES
12/05/24 0934   Appointment Info   Signing Clinician's Name / Credentials (PT) Sandy Ram DPT       Present no   Language English   Living Environment   People in Home significant other   Current Living Arrangements house   Home Accessibility stairs to enter home   Number of Stairs, Main Entrance 3   Stair Railings, Main Entrance railings safe and in good condition;railings on both sides of stairs   Stair Railings, Within Home, Primary railings safe and in good condition;railings on both sides of stairs   Transportation Anticipated family or friend will provide   Self-Care   Usual Activity Tolerance good   Current Activity Tolerance fair   Regular Exercise No   Equipment Currently Used at Home cane, quad   Fall history within last six months no   General Information   Onset of Illness/Injury or Date of Surgery 12/04/24   Referring Physician Brigette Daniel PA-C   Patient/Family Therapy Goals Statement (PT) Did not endorse   Pertinent History of Current Problem (include personal factors and/or comorbidities that impact the POC) 70 yo M with PMH HTN, acquired right foot drop, history of avascular necrosis R hip s/p L1-pelvis fusion with TLIF/SPO L2-S1 12/4/2024 by Dr. Pedroza   Existing Precautions/Restrictions fall;spinal   Weight-Bearing Status - LUE full weight-bearing   Weight-Bearing Status - RUE full weight-bearing   Weight-Bearing Status - LLE weight-bearing as tolerated   Weight-Bearing Status - RLE weight-bearing as tolerated   General Observations Supine in bed upon arrival, agreeable to PT   Cognition   Affect/Mental Status (Cognition) WNL   Orientation Status (Cognition) oriented x 3   Follows Commands (Cognition) WNL   Pain Assessment   Patient Currently in Pain Yes, see Vital Sign flowsheet   Integumentary/Edema   Integumentary/Edema no deficits were identifed   Posture    Posture Forward head position;Protracted shoulders;Kyphosis   Range of Motion (ROM)   ROM Comment Did  not formally assess, demonstrates functional ROM with mobility   Strength (Manual Muscle Testing)   Strength Comments Did not formally assess, demonstrates functional strength with mobility   Bed Mobility   Comment, (Bed Mobility) Completes supine<>sit transfer with use of log roll technique and use of rail   Transfers   Comment, (Transfers) Unable to attempt d/t BP   Gait/Stairs (Locomotion)   Comment, (Gait/Stairs) Unable to attempt d/t BP   Balance   Balance Comments Supervision sitting EOB, unable to attempt standing at this time   Sensory Examination   Sensory Perception WNL   Clinical Impression   Criteria for Skilled Therapeutic Intervention Yes, treatment indicated   PT Diagnosis (PT) impaired functional mobility   Influenced by the following impairments increased post-op pain, precautions, decreased activity tolerance   Functional limitations due to impairments impaired bed mobility, transfers and ambulation   Clinical Presentation (PT Evaluation Complexity) stable   Clinical Presentation Rationale Per clinical judgment   Clinical Decision Making (Complexity) low complexity   Planned Therapy Interventions (PT) balance training;bed mobility training;gait training;home exercise program;strengthening;stair training;transfer training;progressive activity/exercise;risk factor education;home program guidelines   Risk & Benefits of therapy have been explained evaluation/treatment results reviewed;care plan/treatment goals reviewed;risks/benefits reviewed;current/potential barriers reviewed   PT Total Evaluation Time   PT Eval, Low Complexity Minutes (83931) 7   Physical Therapy Goals   PT Frequency Daily   PT Predicted Duration/Target Date for Goal Attainment 12/12/24   PT Goals Bed Mobility;Transfers;Gait;Stairs   PT: Bed Mobility Supervision/stand-by assist;Supine to/from sit;Within precautions   PT: Transfers Supervision/stand-by assist;Sit to/from stand;Bed to/from chair;Assistive device   PT: Gait  Supervision/stand-by assist;Assistive device;100 feet   PT: Stairs Supervision/stand-by assist;Assistive device;3 stairs;Rail on both sides   Therapeutic Activity   Therapeutic Activities: dynamic activities to improve functional performance Minutes (41901) 23   Symptoms Noted During/After Treatment Fatigue;Increased pain   Treatment Detail/Skilled Intervention PT: Supine in bed upon arrival, agreeable to PT. Educated on precautions following surgery, verbalized understanding. Patient then completes supine<>sit transfer with use of log roll technique and side rail. Sat EOB with good tolerance. Patient then reported dizziness so BP taken. Patient with significantly low BP (refer to flowsheet) and needed to return to bed. Ended back in bed with all needs in reach.   PT Discharge Planning   PT Plan PT: Functional mobility, attempt up to recliner, gait as able   PT Discharge Recommendation (DC Rec) home with assist   PT Rationale for DC Rec Anticipate pending LOS patient will be safe to return home with assist from signficant other   PT Brief overview of current status SBA for bed mobility   Physical Therapy Time and Intention   Timed Code Treatment Minutes 23   Total Session Time (sum of timed and untimed services) 30

## 2024-12-06 ENCOUNTER — APPOINTMENT (OUTPATIENT)
Dept: PHYSICAL THERAPY | Facility: CLINIC | Age: 69
DRG: 427 | End: 2024-12-06
Attending: ORTHOPAEDIC SURGERY
Payer: MEDICARE

## 2024-12-06 ENCOUNTER — APPOINTMENT (OUTPATIENT)
Dept: OCCUPATIONAL THERAPY | Facility: CLINIC | Age: 69
DRG: 427 | End: 2024-12-06
Attending: ORTHOPAEDIC SURGERY
Payer: MEDICARE

## 2024-12-06 LAB
ANION GAP SERPL CALCULATED.3IONS-SCNC: 10 MMOL/L (ref 7–15)
BUN SERPL-MCNC: 31.5 MG/DL (ref 8–23)
CALCIUM SERPL-MCNC: 8.7 MG/DL (ref 8.8–10.4)
CHLORIDE SERPL-SCNC: 106 MMOL/L (ref 98–107)
CREAT SERPL-MCNC: 1.11 MG/DL (ref 0.67–1.17)
EGFRCR SERPLBLD CKD-EPI 2021: 71 ML/MIN/1.73M2
ERYTHROCYTE [DISTWIDTH] IN BLOOD BY AUTOMATED COUNT: 12.3 % (ref 10–15)
GLUCOSE BLDC GLUCOMTR-MCNC: 119 MG/DL (ref 70–99)
GLUCOSE SERPL-MCNC: 144 MG/DL (ref 70–99)
HCO3 SERPL-SCNC: 23 MMOL/L (ref 22–29)
HCT VFR BLD AUTO: 30.3 % (ref 40–53)
HGB BLD-MCNC: 10.6 G/DL (ref 13.3–17.7)
MCH RBC QN AUTO: 32 PG (ref 26.5–33)
MCHC RBC AUTO-ENTMCNC: 35 G/DL (ref 31.5–36.5)
MCV RBC AUTO: 92 FL (ref 78–100)
PLATELET # BLD AUTO: 161 10E3/UL (ref 150–450)
POTASSIUM SERPL-SCNC: 4.3 MMOL/L (ref 3.4–5.3)
RBC # BLD AUTO: 3.31 10E6/UL (ref 4.4–5.9)
SODIUM SERPL-SCNC: 139 MMOL/L (ref 135–145)
WBC # BLD AUTO: 11.1 10E3/UL (ref 4–11)

## 2024-12-06 PROCEDURE — 250N000011 HC RX IP 250 OP 636: Mod: JZ | Performed by: PHYSICIAN ASSISTANT

## 2024-12-06 PROCEDURE — 99232 SBSQ HOSP IP/OBS MODERATE 35: CPT | Performed by: INTERNAL MEDICINE

## 2024-12-06 PROCEDURE — 85014 HEMATOCRIT: CPT | Performed by: INTERNAL MEDICINE

## 2024-12-06 PROCEDURE — 120N000002 HC R&B MED SURG/OB UMMC

## 2024-12-06 PROCEDURE — 36415 COLL VENOUS BLD VENIPUNCTURE: CPT | Performed by: INTERNAL MEDICINE

## 2024-12-06 PROCEDURE — 80048 BASIC METABOLIC PNL TOTAL CA: CPT | Performed by: INTERNAL MEDICINE

## 2024-12-06 PROCEDURE — 250N000013 HC RX MED GY IP 250 OP 250 PS 637

## 2024-12-06 PROCEDURE — 99207 PR NO BILLABLE SERVICE THIS VISIT: CPT | Performed by: INTERNAL MEDICINE

## 2024-12-06 PROCEDURE — 250N000013 HC RX MED GY IP 250 OP 250 PS 637: Performed by: PHYSICIAN ASSISTANT

## 2024-12-06 PROCEDURE — 258N000003 HC RX IP 258 OP 636: Performed by: INTERNAL MEDICINE

## 2024-12-06 PROCEDURE — 97530 THERAPEUTIC ACTIVITIES: CPT | Mod: GP | Performed by: PHYSICAL THERAPIST

## 2024-12-06 PROCEDURE — 97535 SELF CARE MNGMENT TRAINING: CPT | Mod: GO

## 2024-12-06 RX ORDER — POLYETHYLENE GLYCOL 3350 17 G/17G
17 POWDER, FOR SOLUTION ORAL DAILY
Qty: 510 G | Refills: 0 | Status: SHIPPED | OUTPATIENT
Start: 2024-12-06

## 2024-12-06 RX ORDER — OXYCODONE HYDROCHLORIDE 5 MG/1
5-10 TABLET ORAL EVERY 4 HOURS PRN
Qty: 35 TABLET | Refills: 0 | Status: SHIPPED | OUTPATIENT
Start: 2024-12-06

## 2024-12-06 RX ORDER — AMOXICILLIN 250 MG
2 CAPSULE ORAL 2 TIMES DAILY
COMMUNITY
Start: 2024-12-06

## 2024-12-06 RX ORDER — ARGININE/GLUTAMINE/CALCIUM BMB 7G-7G-1.5G
1 POWDER IN PACKET (EA) ORAL 2 TIMES DAILY
Qty: 24 PACKET | Refills: 0 | Status: SHIPPED | OUTPATIENT
Start: 2024-12-06 | End: 2024-12-18

## 2024-12-06 RX ORDER — ACETAMINOPHEN 325 MG/1
975 TABLET ORAL EVERY 8 HOURS
COMMUNITY
Start: 2024-12-06

## 2024-12-06 RX ADMIN — ACETAMINOPHEN 975 MG: 325 TABLET, FILM COATED ORAL at 19:57

## 2024-12-06 RX ADMIN — Medication 1 TABLET: at 19:57

## 2024-12-06 RX ADMIN — ACETAMINOPHEN 975 MG: 325 TABLET, FILM COATED ORAL at 13:00

## 2024-12-06 RX ADMIN — OXYCODONE HYDROCHLORIDE 10 MG: 10 TABLET ORAL at 13:00

## 2024-12-06 RX ADMIN — OXYCODONE HYDROCHLORIDE 10 MG: 10 TABLET ORAL at 18:38

## 2024-12-06 RX ADMIN — BRINZOLAMIDE/BRIMONIDINE TARTRATE 1 DROP: 10; 2 SUSPENSION/ DROPS OPHTHALMIC at 09:02

## 2024-12-06 RX ADMIN — FLUOROMETHOLONE 1 DROP: 1 SOLUTION/ DROPS OPHTHALMIC at 09:02

## 2024-12-06 RX ADMIN — POLYETHYLENE GLYCOL 3350 17 G: 17 POWDER, FOR SOLUTION ORAL at 19:59

## 2024-12-06 RX ADMIN — SODIUM CHLORIDE 1000 ML: 9 INJECTION, SOLUTION INTRAVENOUS at 12:45

## 2024-12-06 RX ADMIN — FAMOTIDINE 20 MG: 20 TABLET ORAL at 08:52

## 2024-12-06 RX ADMIN — OXYCODONE HYDROCHLORIDE 10 MG: 10 TABLET ORAL at 23:35

## 2024-12-06 RX ADMIN — Medication 1 PACKET: at 19:59

## 2024-12-06 RX ADMIN — TIZANIDINE 2 MG: 2 TABLET ORAL at 06:12

## 2024-12-06 RX ADMIN — FOLIC ACID 1 MG: 1 TABLET ORAL at 08:52

## 2024-12-06 RX ADMIN — Medication 1 PACKET: at 11:18

## 2024-12-06 RX ADMIN — LISINOPRIL 10 MG: 10 TABLET ORAL at 08:52

## 2024-12-06 RX ADMIN — OXYCODONE HYDROCHLORIDE 10 MG: 10 TABLET ORAL at 08:58

## 2024-12-06 RX ADMIN — Medication 500 MG: at 08:52

## 2024-12-06 RX ADMIN — POLYETHYLENE GLYCOL 3350 17 G: 17 POWDER, FOR SOLUTION ORAL at 08:59

## 2024-12-06 RX ADMIN — Medication 2 G: at 04:12

## 2024-12-06 RX ADMIN — ACETAMINOPHEN 650 MG: 325 TABLET, FILM COATED ORAL at 23:35

## 2024-12-06 RX ADMIN — Medication 25 MCG: at 08:52

## 2024-12-06 RX ADMIN — ACETAMINOPHEN 975 MG: 325 TABLET, FILM COATED ORAL at 04:12

## 2024-12-06 RX ADMIN — Medication 1 TABLET: at 08:51

## 2024-12-06 RX ADMIN — BRINZOLAMIDE/BRIMONIDINE TARTRATE 1 DROP: 10; 2 SUSPENSION/ DROPS OPHTHALMIC at 19:57

## 2024-12-06 RX ADMIN — LISINOPRIL 5 MG: 5 TABLET ORAL at 19:56

## 2024-12-06 RX ADMIN — Medication 2 G: at 12:30

## 2024-12-06 RX ADMIN — FAMOTIDINE 20 MG: 20 TABLET ORAL at 19:57

## 2024-12-06 RX ADMIN — SENNOSIDES AND DOCUSATE SODIUM 2 TABLET: 50; 8.6 TABLET ORAL at 19:56

## 2024-12-06 RX ADMIN — TIZANIDINE 2 MG: 2 TABLET ORAL at 19:59

## 2024-12-06 RX ADMIN — OXYCODONE HYDROCHLORIDE 10 MG: 10 TABLET ORAL at 04:11

## 2024-12-06 ASSESSMENT — ACTIVITIES OF DAILY LIVING (ADL)
ADLS_ACUITY_SCORE: 21
ADLS_ACUITY_SCORE: 20
ADLS_ACUITY_SCORE: 20
ADLS_ACUITY_SCORE: 21
ADLS_ACUITY_SCORE: 21
ADLS_ACUITY_SCORE: 20
ADLS_ACUITY_SCORE: 31
ADLS_ACUITY_SCORE: 20
ADLS_ACUITY_SCORE: 21
ADLS_ACUITY_SCORE: 20
ADLS_ACUITY_SCORE: 31
ADLS_ACUITY_SCORE: 21
ADLS_ACUITY_SCORE: 21
ADLS_ACUITY_SCORE: 20
ADLS_ACUITY_SCORE: 21
ADLS_ACUITY_SCORE: 21
ADLS_ACUITY_SCORE: 20

## 2024-12-06 NOTE — PLAN OF CARE
Patient is alert and oriented.  Aquacel dressing to spine is CDI.  Hemovac dressing on right side is saturated, drain is putting out good amounts.  Remains on lidocaine drip, is helping control his pain.  Is receiving oral pain meds as they are available.  Ice packs to surgical site.  Is passing flatus, no bowel movement today.  Is tolerating regular diet.  Has foot drop on the right side.  Azevedo removed this afternoon and has been voiding using a urinal, PVRs have been minimal.  Is laying on his sides and is avoiding laying on his back.  Wife is at bedside.  Call light is within reach.  Is able to make needs known.  Will continue with plan of care.

## 2024-12-06 NOTE — PROGRESS NOTES
Nutrition Prescription    Recommendations already ordered by Registered Dietitian (RD):  Lennox BID   Ensure Enlive BID    Future/Additional Recommendations:  RD to sign off and will remain available by consult if new nutrition concerns arise or for LOS protocol       REASON FOR ASSESSMENT  assessed by the dietitian for flavor preference for Lennox supplement ordered by provider    Reason for admission:    PMH:      Findings  Met with pt at bedside to obtain flavor preference for Lennox supplement. Described supplement and delivery.  Pt requested to decrease delivery of Ensure Enlive to BID and to add to meal trays.  LABS  Reviewed    162.6 cm  Body mass index is 29.29 kg/m .    Wt Readings from Last 10 Encounters:   04/25/24 77.4 kg (170 lb 10.2 oz)   04/12/24 78.1 kg (172 lb 1.6 oz)   07/24/23 74.3 kg (163 lb 14.2 oz)   03/30/18 71.8 kg (158 lb 6.4 oz)   01/23/18 69.9 kg (154 lb)   12/20/17 68.9 kg (151 lb 14.4 oz)   11/24/17 68.1 kg (150 lb 1.6 oz)   10/27/17 68.1 kg (150 lb 1.6 oz)   10/18/17 68 kg (150 lb)   10/17/17 68.2 kg (150 lb 6.4 oz)     INTERVENTIONS  Implementation  Nutrition Education: Per provider order if indicated   Multivitamin/mineral supplement therapy   Supplemental nutrition drinks    Follow up/Monitoring  RD to sign off and will remain available by consult if new nutrition concerns arise or for LOS protocol    Nayla Peterson RDN [facility] 5[facility]/10[address] ICU RD pager: [phone]   Weekend/Holiday Vocera: Weekend Holiday Clinical Dietitian [Multi Site Groups]

## 2024-12-06 NOTE — PLAN OF CARE
Goal Outcome Evaluation:      Plan of Care Reviewed With: patient  Overall Patient Progress: improving  Overall Patient Progress: improving    VS: Stable on RA    CV/Resp: Denies SOB and chest pain   Neuro/MSK: A&O x4. R foot drop at baseline. Denies numbness & tingling   Pain: 5/10 - lidocaine drip infusing @ 10mL/hr, no signs of toxicity. PRN oxycodone available Q4 hrs   GI/: Continent of B&B. No BM this shift   Diet: Regular with thin liquids. Takes pills whole    Activity: Not OOB this shift. [place] brace on when ambulating.   Dressing: Spinal incision dressing CDI. Hemovac dressing saturated   Skin: Blanchable redness on chest & knees from surgery   Lines/Access: R PIV infusing lidocaine & carrier fluid. L PIV SL. Hemovac to bulb suction draining well. 100 mL output this shift     Shift Summary: No acute changes this shift

## 2024-12-06 NOTE — PROGRESS NOTES
Orthopaedic Surgery Progress Note 12/05/2024    S: No acute events overnight.  Pain controlled on IV/Oral meds, on ketamine/lidocaine. Denies numbness or tingling in BLE. Denies fevers, chills, chest pain, SOB, new N/T, changes in vision.  Tolerating oral diet. -BM, +flatus. Voiding spontaneously.  Worked with therapy, recommending discharge home with assist.     O:  Temp: 98.2  F (36.8  C) Temp src: Oral BP: (!) 160/73 Pulse: 83   Resp: 17 SpO2: 95 % O2 Device: None (Room air) Oxygen Delivery: 2 LPM    Exam:  Gen: No acute distress, resting comfortably in bed.  Resp: Non-labored breathing  MSK:  Spine:  - Dressings c/d/i  - Drain patent with serosanguinous output  - DP pulses 2+ bilaterally, feet wwp bilaterally     Lumbar Spine:    Motor -     L2-3: Hip flexion R 5/5  And L 5/5 strength          L3/4:  Knee extension R 5/5 and L 5/5 strength         L4/5:  Foot dorsiflexion R 2/5 L 5/5 and       EHL dorsiflexion R 2/5 L 5/5 strength         S1:  Plantarflexion/Peroneal Muscles  R 5/5 and L 5/5 strength    Sensation: intact to light touch L3-S1 distribution BLE        Drain output: NR/180/    Recent Labs   Lab 12/05/24  0803 12/04/24  1311 12/04/24  1215   HGB 10.6* 11.3* 11.7*     Erythrocyte Sedimentation Rate   Date Value Ref Range Status   03/07/2024 3 0 - 20 mm/hr Final         Culture results: n/a    Assessment: 70 yo M with PMH HTN, acquired right foot drop, history of avascular necrosis R hip s/p L1-pelvis fusion with TLIF/SPO L2-S1 12/4/2024 by Dr. Pedroza    Today:   - Pain control, discontinue lidocaine/ketamine  - Mobilize with therapy. Ok to mobilize and work with therapy prior to brace arriving.   - Follow drain output     Plan:  Orthopedic Primary  Activity: Up with assist until independent. No excessive bending or twisting. No lifting >10 lbs x 6 weeks. Encourage ambulation. If Dorina lift needed, use high back sling to avoid spine flexion. Patient is falls risk, history of syncope and fluid  sensitivity.   Weight bearing status: WBAT.  Pain management:   IV lidocaine: discontinue at 8am on POD#2 or earlier if complications arise.  Oxycodone PO, IV dilaudid prn, APAP, muscle relaxants. Transition from IV to PO narcotics as tolerated. Toradol x 24 hr then no NSAIDs    Antibiotics: Antibiotics x 48 hr  Diet: Begin with clear fluids and progress diet as tolerated. Lennox BID. Protein shakes BID.  DVT prophylaxis: SCDs only. No chemical DVT ppx needed.  Imaging: XR Upright scoli AP/lat PTDC - ordered.  Labs: Hgb POD# 1, 2  Bracing/Splinting: Kathleen OOB. Ok to mobilize and work with therapy prior to brace arriving. .  Dressings: Keep dressing c/d/i x 7 days  Drains: deep and superficial hemovac. Document output per shift, will be discontinued at Orthopedic Surgery discretion.  Azevedo catheter: Remove POD#1    Physical Therapy/Occupational Therapy: Eval and treat.  Cultures: none.    Consults: Hospitalist.  Follow-up: Clinic with Dr. Pedroza in 6 weeks with repeat x-rays.   Disposition: Pending progress with therapies, pain control on orals, and medical stability, anticipate discharge to home on POD #5-7.    Future Appointments   Date Time Provider Department Center   12/5/2024  9:15 AM Sandy Ram, PT [facility] [place]   12/5/2024 10:00 AM Rafael Seo, OTR [facility] [place]   1/16/2025  2:20 PM Chin Pedroza MD [facility]       Patient discussed with Dr. Pedroza.        --  Melissa Hemphill MD  Orthopedic Surgery PGY-4

## 2024-12-06 NOTE — PROGRESS NOTES
[facility]    Medicine Progress Note - Hospitalist Service, GOLD TEAM 20    Date of Admission:  12/4/2024    Assessment & Plan   Kar López is a 69 year old male admitted on 12/4/2024. He has a history of hypertension, acquired R foot drop, hx of avascular necrosis R hip, chronic pain, DJD. Posterior instrumented spinal fusion L1 or 2 to pelvis, fusion L2 - S1 w/ Dr Pedroza. Medicine consulted for medical co-management.     S/p L1-pelvis fusion with TLIF/SPO L2-S1 (12/4/24)  Hx of lumbosacral spondylosis without myelopathy  Hx of chronic back pain w/ b/l leg radiculopathy. 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   - Up with assist until independent. No excessive bending or twisting. No lifting >10 lbs x 6 weeks  - Pain management:   - PO oxycodone prn   - APAP 975 mg TID   - Toradol x 24 hours and then no NSAIDs  - Ancef x 48 hours  - Clear liquid, ADAT  - GERD ppx: pepcid bid   - Bowel regimen: senna and miralax scheduled   - Kathleen brace when OOB  - Drains: deep and superficial hemovac  - PT/OT  - Post op Hgb at 10.6, reflecting acute blood loss anemia. No transfusion necessary. Significant drain output in the last 24 hrs  (420 mls) and orthostatic hypotension. IV bolus fluid to be given today   Encourage oral hydration     Hypertension  - continue pta lisinopril 10 mg qAM, takes either 5 or 10 mg in the evening depending on his blood pressures.   Schedule 5 mg in the evening for now and can adjust pending blood pressures.   Mild orthostatic hypotension. Encourage oral fluis       Glaucoma  S/p cataract surgery R eye   - continue pta Simbrinza and liquifilm eye drops     S/p total hip arthroplasty 2013: denies issues            Diet: Advance Diet as Tolerated: Regular Diet Adult  Snacks/Supplements Adult: Other; Ensure Enlive vanilla at B/D, Lennox  "orange at L/D; With Meals    DVT Prophylaxis: Pneumatic Compression Devices  Azevedo Catheter: Not present  Lines: None     Cardiac Monitoring: None  Code Status:  Full     Clinically Significant Risk Factors            # Hypercalcemia: Highest iCa = 5.5 mg/dL in last 2 days, will monitor as appropriate        # Hypertension: Noted on problem list            # Overweight: Estimated body mass index is 25.04 kg/m  as calculated from the following:    Height as of this encounter: 1.854 m (6' 1\").    Weight as of this encounter: 86.1 kg (189 lb 13.1 oz)., PRESENT ON ADMISSION            Social Drivers of Health            Disposition Plan     Medically Ready for Discharge: Anticipated in 2-4 Days             Alex Ribeiro MD  Hospitalist Service, GOLD TEAM 20  [facility]  Securely message with [employer] (more info)  Text page via [facility] Paging/Directory   See signed in provider for up to date coverage information  ______________________________________________________________________    Interval History    Charts reviewed and patient was examined.   He did not sleep until 4 am today. Also noted that his drain bag was full   Denies chest pain or SOB   No fever or chills     Physical Exam   Vital Signs: Temp: 98.6  F (37  C) Temp src: Oral BP: 125/69 Pulse: 86   Resp: 18 SpO2: 93 % O2 Device: None (Room air)    Weight: 189 lbs 13.06 oz    General Appearance: Awake, alert and not in distress  Respiratory: Clear breath sounds bilaterally   Cardiovascular: Normal heart sounds. No murmurs   GI: Soft, non tender. Normal bowel sounds   Skin: No bruising or bleeding   Other:Awake, alert and orientated X 3       Medical Decision Making       38  MINUTES SPENT BY ME on the date of service doing chart review, history, exam, documentation & further activities per the note.  MANAGEMENT DISCUSSED with the following over the past 24 hours: Patient, bedside RN, care managements and " primary team        Data

## 2024-12-06 NOTE — PLAN OF CARE
"Goal Outcome Evaluation:    2300-0730  BP (!) 142/70 (BP Location: Left arm, Patient Position: Left side, Cuff Size: Adult Regular)   Pulse 88   Temp 99.3  F (37.4  C) (Oral)   Resp 17   Ht 1.854 m (6' 1\")   Wt 86.1 kg (189 lb 13.1 oz)   SpO2 92%   BMI 25.04 kg/m     AOX4.On room air.Using IS well.  Aquacel to back,rolling upwards when rechecked this morning,drsg reinforced with new aquacel on lower side.  HV site with dried drainage.HV patent,emptied atleast every 4hrs as draining moderate amts dark reddish output.Pls see flowsheet for specifics.  Pain managed with tylenol,oxycodone,tizanidine.Ice pack refreshed.Lidocaine drip to be discontinued today POD2.  PIV line L wrist for  IV abx/lidocaine drip.  Voiding on urinal.  Is passing gas,last BM 2 days ago.  Not out of bed this shift.Will have pt work with therapy today.  PCDs on.  Calls to make needs known.                          "

## 2024-12-07 ENCOUNTER — APPOINTMENT (OUTPATIENT)
Dept: PHYSICAL THERAPY | Facility: CLINIC | Age: 69
DRG: 427 | End: 2024-12-07
Attending: ORTHOPAEDIC SURGERY
Payer: MEDICARE

## 2024-12-07 ENCOUNTER — APPOINTMENT (OUTPATIENT)
Dept: OCCUPATIONAL THERAPY | Facility: CLINIC | Age: 69
DRG: 427 | End: 2024-12-07
Attending: ORTHOPAEDIC SURGERY
Payer: MEDICARE

## 2024-12-07 LAB
ANION GAP SERPL CALCULATED.3IONS-SCNC: 9 MMOL/L (ref 7–15)
BUN SERPL-MCNC: 31.5 MG/DL (ref 8–23)
CALCIUM SERPL-MCNC: 8.8 MG/DL (ref 8.8–10.4)
CHLORIDE SERPL-SCNC: 106 MMOL/L (ref 98–107)
CREAT SERPL-MCNC: 1 MG/DL (ref 0.67–1.17)
EGFRCR SERPLBLD CKD-EPI 2021: 81 ML/MIN/1.73M2
ERYTHROCYTE [DISTWIDTH] IN BLOOD BY AUTOMATED COUNT: 12.4 % (ref 10–15)
GLUCOSE SERPL-MCNC: 112 MG/DL (ref 70–99)
HCO3 SERPL-SCNC: 25 MMOL/L (ref 22–29)
HCT VFR BLD AUTO: 30 % (ref 40–53)
HGB BLD-MCNC: 10.2 G/DL (ref 13.3–17.7)
HOLD SPECIMEN: NORMAL
MCH RBC QN AUTO: 30.7 PG (ref 26.5–33)
MCHC RBC AUTO-ENTMCNC: 34 G/DL (ref 31.5–36.5)
MCV RBC AUTO: 90 FL (ref 78–100)
PLATELET # BLD AUTO: 176 10E3/UL (ref 150–450)
POTASSIUM SERPL-SCNC: 4.2 MMOL/L (ref 3.4–5.3)
RBC # BLD AUTO: 3.32 10E6/UL (ref 4.4–5.9)
SODIUM SERPL-SCNC: 140 MMOL/L (ref 135–145)
WBC # BLD AUTO: 11.5 10E3/UL (ref 4–11)

## 2024-12-07 PROCEDURE — 82435 ASSAY OF BLOOD CHLORIDE: CPT | Performed by: INTERNAL MEDICINE

## 2024-12-07 PROCEDURE — 250N000013 HC RX MED GY IP 250 OP 250 PS 637: Performed by: INTERNAL MEDICINE

## 2024-12-07 PROCEDURE — 120N000002 HC R&B MED SURG/OB UMMC

## 2024-12-07 PROCEDURE — 97535 SELF CARE MNGMENT TRAINING: CPT | Mod: GO

## 2024-12-07 PROCEDURE — 85014 HEMATOCRIT: CPT | Performed by: INTERNAL MEDICINE

## 2024-12-07 PROCEDURE — 97530 THERAPEUTIC ACTIVITIES: CPT | Mod: GP

## 2024-12-07 PROCEDURE — 97116 GAIT TRAINING THERAPY: CPT | Mod: GP

## 2024-12-07 PROCEDURE — 80048 BASIC METABOLIC PNL TOTAL CA: CPT | Performed by: INTERNAL MEDICINE

## 2024-12-07 PROCEDURE — 36415 COLL VENOUS BLD VENIPUNCTURE: CPT | Performed by: INTERNAL MEDICINE

## 2024-12-07 PROCEDURE — 250N000013 HC RX MED GY IP 250 OP 250 PS 637: Performed by: PHYSICIAN ASSISTANT

## 2024-12-07 RX ADMIN — SENNOSIDES AND DOCUSATE SODIUM 2 TABLET: 50; 8.6 TABLET ORAL at 09:20

## 2024-12-07 RX ADMIN — Medication 500 MG: at 09:20

## 2024-12-07 RX ADMIN — ACETAMINOPHEN 975 MG: 325 TABLET, FILM COATED ORAL at 04:04

## 2024-12-07 RX ADMIN — BRINZOLAMIDE/BRIMONIDINE TARTRATE 1 DROP: 10; 2 SUSPENSION/ DROPS OPHTHALMIC at 09:19

## 2024-12-07 RX ADMIN — POLYETHYLENE GLYCOL 3350 17 G: 17 POWDER, FOR SOLUTION ORAL at 19:53

## 2024-12-07 RX ADMIN — OXYCODONE HYDROCHLORIDE 10 MG: 10 TABLET ORAL at 09:18

## 2024-12-07 RX ADMIN — ACETAMINOPHEN 975 MG: 325 TABLET, FILM COATED ORAL at 13:27

## 2024-12-07 RX ADMIN — MAGNESIUM HYDROXIDE 30 ML: 400 SUSPENSION ORAL at 23:21

## 2024-12-07 RX ADMIN — Medication 1 TABLET: at 09:20

## 2024-12-07 RX ADMIN — OXYCODONE HYDROCHLORIDE 10 MG: 10 TABLET ORAL at 04:04

## 2024-12-07 RX ADMIN — LISINOPRIL 10 MG: 10 TABLET ORAL at 09:19

## 2024-12-07 RX ADMIN — Medication 1 PACKET: at 09:17

## 2024-12-07 RX ADMIN — FAMOTIDINE 20 MG: 20 TABLET ORAL at 19:53

## 2024-12-07 RX ADMIN — TIZANIDINE 2 MG: 2 TABLET ORAL at 19:53

## 2024-12-07 RX ADMIN — Medication 25 MCG: at 09:20

## 2024-12-07 RX ADMIN — FAMOTIDINE 20 MG: 20 TABLET ORAL at 09:20

## 2024-12-07 RX ADMIN — FOLIC ACID 1 MG: 1 TABLET ORAL at 09:20

## 2024-12-07 RX ADMIN — BRINZOLAMIDE/BRIMONIDINE TARTRATE 1 DROP: 10; 2 SUSPENSION/ DROPS OPHTHALMIC at 19:53

## 2024-12-07 RX ADMIN — POLYETHYLENE GLYCOL 3350 17 G: 17 POWDER, FOR SOLUTION ORAL at 09:21

## 2024-12-07 RX ADMIN — OXYCODONE HYDROCHLORIDE 10 MG: 10 TABLET ORAL at 23:21

## 2024-12-07 RX ADMIN — Medication 1 TABLET: at 19:53

## 2024-12-07 RX ADMIN — OXYCODONE HYDROCHLORIDE 10 MG: 10 TABLET ORAL at 13:26

## 2024-12-07 RX ADMIN — OXYCODONE HYDROCHLORIDE 10 MG: 10 TABLET ORAL at 17:45

## 2024-12-07 RX ADMIN — Medication 1 PACKET: at 19:53

## 2024-12-07 RX ADMIN — ACETAMINOPHEN 650 MG: 325 TABLET, FILM COATED ORAL at 23:21

## 2024-12-07 RX ADMIN — SENNOSIDES AND DOCUSATE SODIUM 2 TABLET: 50; 8.6 TABLET ORAL at 19:53

## 2024-12-07 RX ADMIN — FLUOROMETHOLONE 1 DROP: 1 SOLUTION/ DROPS OPHTHALMIC at 09:19

## 2024-12-07 RX ADMIN — LISINOPRIL 5 MG: 5 TABLET ORAL at 19:53

## 2024-12-07 ASSESSMENT — ACTIVITIES OF DAILY LIVING (ADL)
ADLS_ACUITY_SCORE: 39
ADLS_ACUITY_SCORE: 31
ADLS_ACUITY_SCORE: 37
ADLS_ACUITY_SCORE: 37
ADLS_ACUITY_SCORE: 31
ADLS_ACUITY_SCORE: 39
ADLS_ACUITY_SCORE: 37
ADLS_ACUITY_SCORE: 37
ADLS_ACUITY_SCORE: 31
ADLS_ACUITY_SCORE: 39
ADLS_ACUITY_SCORE: 37
ADLS_ACUITY_SCORE: 39
ADLS_ACUITY_SCORE: 37
ADLS_ACUITY_SCORE: 31

## 2024-12-07 NOTE — PROGRESS NOTES
[facility]    Medicine Progress Note - Hospitalist Service, GOLD TEAM 20    Date of Admission:  12/4/2024    Assessment & Plan   Kar López is a 69 year old male admitted on 12/4/2024. He has a history of hypertension, acquired R foot drop, hx of avascular necrosis R hip, chronic pain, DJD. Posterior instrumented spinal fusion L1 or 2 to pelvis, fusion L2 - S1 w/ Dr Pedroza. Medicine consulted for medical co-management.     S/p L1-pelvis fusion with TLIF/SPO L2-S1 (12/4/24)  Hx of lumbosacral spondylosis without myelopathy  Hx of chronic back pain w/ b/l leg radiculopathy. 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   - Up with assist until independent. No excessive bending or twisting. No lifting >10 lbs x 6 weeks  - Pain management:   - PO oxycodone prn   - APAP 975 mg TID   - Toradol x 24 hours and then no NSAIDs  - Ancef x 48 hours  - Clear liquid, ADAT  - GERD ppx: pepcid bid   - Bowel regimen: senna and miralax scheduled   - Kathleen brace when OOB  - Drains: deep and superficial hemovac  - PT/OT  - Post op Hgb at 10.2, reflecting acute blood loss anemia. No transfusion necessary. Significant drain output  in the early pst op period   (420 mls), and 175 mls in the last 24 hrs, and orthostatic hypotension, needing IV bolus infusion   Encourage oral hydration     Hypertension  - continue pta lisinopril 10 mg qAM, takes either 5 or 10 mg in the evening depending on his blood pressures.   Ordered at 10 mg am and 5 mg at pm    Patient had orthostatic hypotension requiring fluid bolus   Lisinopril to be given only of patient meets BP parameters         Glaucoma  S/p cataract surgery R eye   - continue pta Simbrinza and liquifilm eye drops     S/p total hip arthroplasty 2013: denies issues            Diet: Advance Diet as Tolerated: Regular Diet  "Adult  Snacks/Supplements Adult: Other; Ensure Enlive vanilla at B/D, Lennox orange at L/D; With Meals  Discharge Instruction - Regular Diet Adult    DVT Prophylaxis: Pneumatic Compression Devices  Azevedo Catheter: Not present  Lines: None     Cardiac Monitoring: None  Code Status:  Full     Clinically Significant Risk Factors                   # Hypertension: Noted on problem list            # Overweight: Estimated body mass index is 25.04 kg/m  as calculated from the following:    Height as of this encounter: 1.854 m (6' 1\").    Weight as of this encounter: 86.1 kg (189 lb 13.1 oz)., PRESENT ON ADMISSION            Social Drivers of Health            Disposition Plan     Medically Ready for Discharge: Anticipated Tomorrow             Alex Ribeiro MD  Hospitalist Service, GOLD TEAM 20  [facility]  Securely message with [employer] (more info)  Text page via [employer] Paging/Directory   See signed in provider for up to date coverage information  ______________________________________________________________________    Interval History    Charts reviewed and patient was examined.   Feels slightly better   No dizziness   Has been drinking lot of fluids   Pain well controlled     Physical Exam   Vital Signs: Temp: 99.7  F (37.6  C) Temp src: Oral BP: (!) 147/80 Pulse: 78   Resp: 18 SpO2: 99 % O2 Device: None (Room air)    Weight: 189 lbs 13.06 oz    General Appearance: Awake, alert and not in distress  Respiratory: Clear breath sounds bilaterally   Cardiovascular: Normal heart sounds. No murmurs   GI: Soft, non tender. Normal bowel sounds   Skin: No bruising or bleeding   Other:Awake, alert and orientated X 3       Medical Decision Making       38  MINUTES SPENT BY ME on the date of service doing chart review, history, exam, documentation & further activities per the note.  MANAGEMENT DISCUSSED with the following over the past 24 hours: Patient, bedside RN, care " managements and primary team        Data

## 2024-12-07 NOTE — PLAN OF CARE
Goal Outcome Evaluation: 1439-6577      Plan of Care Reviewed With: patient    Overall Patient Progress: improvingOverall Patient Progress: improving    Outcome Evaluation: PT A&O x4. on RA. denied SOB, CP, N/V. Assist 1, not oob this shift, repositioning in bed. Urinal at bedside. avoiding adequately. reg diet. surgical dressing CDI. Hemovac patent. R PIV SL. Plan of care continues.

## 2024-12-07 NOTE — PROGRESS NOTES
Orthopaedic Surgery Progress Note 12/07/2024    S: No acute events overnight.  Pain controlled on IV/Oral meds, ketamine/lidocaine discontinued yesterday. Denies new numbness or tingling in BLE. Denies fevers, chills, chest pain, SOB, new N/T, changes in vision.  Tolerating oral diet. -BM, +flatus. Voiding spontaneously.  Worked with therapy, recommending discharge home with assist.     O:  Temp: 99.7  F (37.6  C) Temp src: Oral BP: (!) 147/80 Pulse: 78   Resp: 18 SpO2: 99 % O2 Device: None (Room air)      Exam:  Gen: No acute distress, resting comfortably in bed.  Resp: Non-labored breathing  MSK:  Spine:  - Dressings c/d/i  - Drain patent with serosanguinous output  - DP pulses 2+ bilaterally, feet wwp bilaterally     Lumbar Spine:    Motor -     L2-3: Hip flexion R 5/5  And L 5/5 strength          L3/4:  Knee extension R 5/5 and L 5/5 strength         L4/5:  Foot dorsiflexion R 2/5 L 5/5 and       EHL dorsiflexion R 2/5 L 5/5 strength         S1:  Plantarflexion/Peroneal Muscles  R 5/5 and L 5/5 strength    Sensation: intact to light touch L3-S1 distribution BLE        Drain output: 60/75/40    Recent Labs   Lab 12/07/24  0547 12/06/24  0835 12/05/24  0803   WBC 11.5* 11.1*  --    HGB 10.2* 10.6* 10.6*    161  --      Erythrocyte Sedimentation Rate   Date Value Ref Range Status   03/07/2024 3 0 - 20 mm/hr Final         Culture results: n/a    Assessment: 68 yo M with PMH HTN, acquired right foot drop, history of avascular necrosis R hip s/p L1-pelvis fusion with TLIF/SPO L2-S1 12/4/2024 by Dr. Pedroza    Today:   - Pain control, wean IV as able  - Mobilize with therapy  - Follow drain output     Plan:  Orthopedic Primary  Activity: Up with assist until independent. No excessive bending or twisting. No lifting >10 lbs x 6 weeks. Encourage ambulation. If Dorina lift needed, use high back sling to avoid spine flexion. Patient is falls risk, history of syncope and fluid sensitivity.   Weight bearing status:  WBAT.  Pain management:   IV lidocaine: discontinue at 8am on POD#2 or earlier if complications arise.  Oxycodone PO, IV dilaudid prn, APAP, muscle relaxants. Transition from IV to PO narcotics as tolerated. Toradol x 24 hr then no NSAIDs    Antibiotics: Antibiotics x 48 hr  Diet: Begin with clear fluids and progress diet as tolerated. Lennox BID. Protein shakes BID.  DVT prophylaxis: SCDs only. No chemical DVT ppx needed.  Imaging: XR Upright scoli AP/lat PTDC - ordered.  Labs: Hgb POD# 1, 2  Bracing/Splinting: Kathleen OOB. Ok to mobilize and work with therapy prior to brace arriving. .  Dressings: Keep dressing c/d/i x 7 days  Drains: deep and superficial hemovac. Document output per shift, will be discontinued at Orthopedic Surgery discretion.  Azevedo catheter: Remove POD#1    Physical Therapy/Occupational Therapy: Eval and treat.  Cultures: none.    Consults: Hospitalist.  Follow-up: Clinic with Dr. Pedroza in 6 weeks with repeat x-rays.   Disposition: Pending progress with therapies, pain control on orals, and medical stability, anticipate discharge to home on POD #5-7.    Future Appointments   Date Time Provider Department Center   12/5/2024  9:15 AM Sandy Ram, PT [facility]   12/5/2024 10:00 AM Rafael Seo, OTR [facility]   1/16/2025  2:20 PM Chin Pedroza MD [facility]       Patient discussed with Dr. Pedroza.        --  Melissa Hemphill MD  Orthopedic Surgery PGY-4

## 2024-12-07 NOTE — PLAN OF CARE
Goal Outcome Evaluation:    Overall Patient Progress: improvingOverall Patient Progress: improving    Outcome Evaluation: Pt is alert&ox4, on RA. Call light within reach. Able to make needs known. Denies chest pain, sob, and n/t. Pt got up with Physical therapy today, and became hypotensive, nauseous and dizzy. Pt was transfered back to bed. provider was informed. Pt was given 1L of bolus per providers orders. Vitals stablized. Antiemetic was offered to pt during this time and pt refused. nausea resolved on it's own with improved BP. Pt did get up again later in the day with OT and tolerated ambulating to the bathroom and back to bed with no issue. Pt is continent bowel and bladder. On regular diet. eating and drinking adequately. surgical site CDI. hemovac patent. R piv SL. P:continue with plan of care

## 2024-12-08 ENCOUNTER — APPOINTMENT (OUTPATIENT)
Dept: PHYSICAL THERAPY | Facility: CLINIC | Age: 69
DRG: 427 | End: 2024-12-08
Attending: ORTHOPAEDIC SURGERY
Payer: MEDICARE

## 2024-12-08 ENCOUNTER — APPOINTMENT (OUTPATIENT)
Dept: OCCUPATIONAL THERAPY | Facility: CLINIC | Age: 69
DRG: 427 | End: 2024-12-08
Attending: ORTHOPAEDIC SURGERY
Payer: MEDICARE

## 2024-12-08 PROCEDURE — 97535 SELF CARE MNGMENT TRAINING: CPT | Mod: GO

## 2024-12-08 PROCEDURE — 250N000013 HC RX MED GY IP 250 OP 250 PS 637: Performed by: INTERNAL MEDICINE

## 2024-12-08 PROCEDURE — 97116 GAIT TRAINING THERAPY: CPT | Mod: GP

## 2024-12-08 PROCEDURE — 99232 SBSQ HOSP IP/OBS MODERATE 35: CPT | Performed by: INTERNAL MEDICINE

## 2024-12-08 PROCEDURE — 97530 THERAPEUTIC ACTIVITIES: CPT | Mod: GO

## 2024-12-08 PROCEDURE — 120N000002 HC R&B MED SURG/OB UMMC

## 2024-12-08 PROCEDURE — 97530 THERAPEUTIC ACTIVITIES: CPT | Mod: GP

## 2024-12-08 PROCEDURE — 250N000013 HC RX MED GY IP 250 OP 250 PS 637: Performed by: PHYSICIAN ASSISTANT

## 2024-12-08 RX ADMIN — ACETAMINOPHEN 650 MG: 325 TABLET, FILM COATED ORAL at 22:10

## 2024-12-08 RX ADMIN — FOLIC ACID 1 MG: 1 TABLET ORAL at 09:02

## 2024-12-08 RX ADMIN — OXYCODONE HYDROCHLORIDE 10 MG: 10 TABLET ORAL at 04:30

## 2024-12-08 RX ADMIN — SENNOSIDES AND DOCUSATE SODIUM 2 TABLET: 50; 8.6 TABLET ORAL at 20:30

## 2024-12-08 RX ADMIN — Medication 25 MCG: at 09:02

## 2024-12-08 RX ADMIN — Medication 500 MG: at 09:02

## 2024-12-08 RX ADMIN — ACETAMINOPHEN 650 MG: 325 TABLET, FILM COATED ORAL at 04:30

## 2024-12-08 RX ADMIN — Medication 1 PACKET: at 20:29

## 2024-12-08 RX ADMIN — FAMOTIDINE 20 MG: 20 TABLET ORAL at 20:30

## 2024-12-08 RX ADMIN — Medication 1 TABLET: at 09:02

## 2024-12-08 RX ADMIN — Medication 1 TABLET: at 20:30

## 2024-12-08 RX ADMIN — SENNOSIDES AND DOCUSATE SODIUM 2 TABLET: 50; 8.6 TABLET ORAL at 09:02

## 2024-12-08 RX ADMIN — BRINZOLAMIDE/BRIMONIDINE TARTRATE 1 DROP: 10; 2 SUSPENSION/ DROPS OPHTHALMIC at 09:08

## 2024-12-08 RX ADMIN — POLYETHYLENE GLYCOL 3350 17 G: 17 POWDER, FOR SOLUTION ORAL at 20:30

## 2024-12-08 RX ADMIN — TIZANIDINE 2 MG: 2 TABLET ORAL at 04:30

## 2024-12-08 RX ADMIN — OXYCODONE HYDROCHLORIDE 10 MG: 10 TABLET ORAL at 09:01

## 2024-12-08 RX ADMIN — LISINOPRIL 10 MG: 10 TABLET ORAL at 09:02

## 2024-12-08 RX ADMIN — BRINZOLAMIDE/BRIMONIDINE TARTRATE 1 DROP: 10; 2 SUSPENSION/ DROPS OPHTHALMIC at 20:29

## 2024-12-08 RX ADMIN — FLUOROMETHOLONE 1 DROP: 1 SOLUTION/ DROPS OPHTHALMIC at 09:08

## 2024-12-08 RX ADMIN — FAMOTIDINE 20 MG: 20 TABLET ORAL at 09:02

## 2024-12-08 RX ADMIN — LISINOPRIL 5 MG: 5 TABLET ORAL at 20:30

## 2024-12-08 RX ADMIN — POLYETHYLENE GLYCOL 3350 17 G: 17 POWDER, FOR SOLUTION ORAL at 09:02

## 2024-12-08 RX ADMIN — ACETAMINOPHEN 650 MG: 325 TABLET, FILM COATED ORAL at 17:35

## 2024-12-08 ASSESSMENT — ACTIVITIES OF DAILY LIVING (ADL)
ADLS_ACUITY_SCORE: 37
ADLS_ACUITY_SCORE: 37
ADLS_ACUITY_SCORE: 39
ADLS_ACUITY_SCORE: 37
ADLS_ACUITY_SCORE: 39
ADLS_ACUITY_SCORE: 39
ADLS_ACUITY_SCORE: 37
ADLS_ACUITY_SCORE: 39
ADLS_ACUITY_SCORE: 37
ADLS_ACUITY_SCORE: 39
ADLS_ACUITY_SCORE: 37
ADLS_ACUITY_SCORE: 39
ADLS_ACUITY_SCORE: 37

## 2024-12-08 NOTE — PLAN OF CARE
Goal Outcome Evaluation: 1900-0700      Plan of Care Reviewed With: patient    Overall Patient Progress: improvingOverall Patient Progress: improving    Outcome Evaluation: PT A&O x4. on RA. denied SOB, CP, N/V. Assist 1, not oob this shift, repositioning in bed. Urinal at bedside. avoiding adequately. reg diet. surgical dressing CDI. Hemovac patent. R PIV SL.   MOM for constipation, Tylenol for fever given.  Plan of care continues

## 2024-12-08 NOTE — PROGRESS NOTES
[facility]    Medicine Progress Note - Hospitalist Service, GOLD TEAM 17    Date of Admission:  12/4/2024    Assessment & Plan   Kar López is a 69 year old male admitted on 12/4/2024. He has a history of hypertension, acquired R foot drop, hx of avascular necrosis R hip, chronic pain, DJD. Posterior instrumented spinal fusion L1 or 2 to pelvis, fusion L2 - S1 w/ Dr Pedroza. Medicine consulted for medical co-management.      S/p L1-pelvis fusion with TLIF/SPO L2-S1 (12/4/24)  Hx of lumbosacral spondylosis without myelopathy:  Pain control adequate.  Ambulating with walker in the halls with therapy.  Still has drains in.  -Pain, bowel regimen, postop cares per surgery  -DVT prophylaxis with ambulation, SCDs per surgical team    ABLA of surgery:  Preop hemoglobin, MCV normal.  Hemoglobin stable at 10.2, MCV normal 12/7.  -CBC 12/9    Leukocytosis:  Low-grade temperatures, improving.  No localizing signs or symptoms of infection.  Did receive steroids in OR.  -CBC 12/9      Hypertension:  Compensated on PTA regimen.  -Continue PTA lisinopril with hold parameters  -BMP 12/9        Glaucoma  S/p cataract surgery R eye   - continue pta Simbrinza and liquifilm eye drops         Diet: Advance Diet as Tolerated: Regular Diet Adult  Snacks/Supplements Adult: Other; Ensure Enlive vanilla at B/D, Lennox orange at L/D; With Meals  Discharge Instruction - Regular Diet Adult    DVT Prophylaxis: Pneumatic Compression Devices  Azevedo Catheter: Not present  Lines: None     Cardiac Monitoring: None  Code Status: Full Code          Garth Murray MD  Hospitalist Service, GOLD TEAM 17  [facility]  Securely message with [employer] (more info)  Text page via [employer] Paging/Directory   See signed in provider for up to date coverage information  ______________________________________________________________________    Interval History    Accompanied by his wife.  She is a retired nurse.  Eating okay, passing flatus but no bowel movements yet.  No chills or night sweats.  No cough or dyspnea.  No dysuria or urgency.  Emptying well.  Pain generally adequately controlled.  Ambulating with a walker in the halls.    Physical Exam   Vital Signs: Temp: 100  F (37.8  C) Temp src: Oral BP: 130/68 Pulse: 83   Resp: 16 SpO2: 97 % O2 Device: None (Room air)    Weight: 189 lbs 13.06 oz  General: Alert and oriented x 4.  Speech normal.  Chest: CTA bilaterally  CV: RRR.  No murmurs.  Abdomen: Normal active bowel sounds.  Soft, nontender, nondistended.  Skin/MS: Aquacel dressing on spine incision.  Drain in place.  Neuro: Nonfocal.  Gait slow, steady with walker.      Data   Recent Labs   Lab 12/07/24  0547 12/06/24  0835 12/06/24  0535 12/05/24  0803 12/05/24  0532 12/04/24  2122 12/04/24  1622 12/04/24  1311   WBC 11.5* 11.1*  --   --   --   --   --   --    HGB 10.2* 10.6*  --  10.6*  --   --   --  11.3*   MCV 90 92  --   --   --   --   --   --     161  --   --   --   --   --   --     139  --   --   --   --   --  136   POTASSIUM 4.2 4.3  --   --   --  4.3  --  4.5   CHLORIDE 106 106  --   --   --   --   --   --    CO2 25 23  --   --   --   --   --   --    BUN 31.5* 31.5*  --   --   --   --   --   --    CR 1.00 1.11  --   --   --  1.09  --   --    ANIONGAP 9 10  --   --   --   --   --   --    ROMI 8.8 8.7*  --   --   --   --   --   --    * 144* 119*  --    < >  --    < > 142*    < > = values in this interval not displayed.

## 2024-12-08 NOTE — CARE PLAN
Plan of care: Reviewed with patient    Overall patient progress: Improving    VS: within normal range; stable    CV/Resp: Denies SOB and chest pain    Neuro/MSK: A&O x 4 right foot drop/weakness baseline. Denies numbness and tingling in extremities.     Pain: 5-6/10 treated with tylenol and oxy Q4 PRN.    GI/: Continent of B&B. No BM this shift. But passing gas.     Diet: Regular; takes pills whole.    Activity: Ambulated in hallway with Pt and SO, pivoted to chair with assist of .    Dressing: Hemovac dressing completely saturated and changed. drainage present on surgical dressing, provider notified, suspected drainage on surgical dressing is likely from from Hemovac.    Lines/Access: RPIV saline locked    Abnormal lab findings: Elevated WBC (11.1) and BUN (31.5). Decreased RBC (3.31), hemoglobin (10.6), and hematocrit (30.3).     Shift Summary: No acute changes this shift. Patient made progress with mobility and ambulation.

## 2024-12-08 NOTE — PLAN OF CARE
"Goal Outcome Evaluation:      Plan of Care Reviewed With: patient      /74 (BP Location: Left arm)   Pulse 88   Temp 99.1  F (37.3  C) (Oral)   Resp 18   Ht 1.854 m (6' 1\")   Wt 86.1 kg (189 lb 13.1 oz)   SpO2 96%   BMI 25.04 kg/m      Outcome Evaluation: Pt is alert and orientd, deinies SOB, chest pain, nausea/vomiting, ambulating to toilet with A1 and  walker. Hemovac draining  bright red blood; 70 ml this shift. Dressing re-enforced with tegaderm. Family at bedside visiting, Call light is in reach and Pt is able to make needs known. Continue with POC.      "

## 2024-12-09 ENCOUNTER — APPOINTMENT (OUTPATIENT)
Dept: GENERAL RADIOLOGY | Facility: CLINIC | Age: 69
DRG: 427 | End: 2024-12-09
Attending: PHYSICIAN ASSISTANT
Payer: MEDICARE

## 2024-12-09 VITALS
WEIGHT: 189.82 LBS | HEIGHT: 73 IN | SYSTOLIC BLOOD PRESSURE: 120 MMHG | TEMPERATURE: 98.4 F | HEART RATE: 79 BPM | DIASTOLIC BLOOD PRESSURE: 73 MMHG | RESPIRATION RATE: 16 BRPM | BODY MASS INDEX: 25.16 KG/M2 | OXYGEN SATURATION: 98 %

## 2024-12-09 LAB
ANION GAP SERPL CALCULATED.3IONS-SCNC: 12 MMOL/L (ref 7–15)
BUN SERPL-MCNC: 27.7 MG/DL (ref 8–23)
CALCIUM SERPL-MCNC: 8.8 MG/DL (ref 8.8–10.4)
CHLORIDE SERPL-SCNC: 102 MMOL/L (ref 98–107)
CREAT SERPL-MCNC: 0.98 MG/DL (ref 0.67–1.17)
EGFRCR SERPLBLD CKD-EPI 2021: 83 ML/MIN/1.73M2
ERYTHROCYTE [DISTWIDTH] IN BLOOD BY AUTOMATED COUNT: 12.1 % (ref 10–15)
GLUCOSE SERPL-MCNC: 120 MG/DL (ref 70–99)
HCO3 SERPL-SCNC: 24 MMOL/L (ref 22–29)
HCT VFR BLD AUTO: 30.2 % (ref 40–53)
HGB BLD-MCNC: 10.4 G/DL (ref 13.3–17.7)
MCH RBC QN AUTO: 30.8 PG (ref 26.5–33)
MCHC RBC AUTO-ENTMCNC: 34.4 G/DL (ref 31.5–36.5)
MCV RBC AUTO: 89 FL (ref 78–100)
PLATELET # BLD AUTO: 283 10E3/UL (ref 150–450)
POTASSIUM SERPL-SCNC: 4.2 MMOL/L (ref 3.4–5.3)
RBC # BLD AUTO: 3.38 10E6/UL (ref 4.4–5.9)
SODIUM SERPL-SCNC: 138 MMOL/L (ref 135–145)
WBC # BLD AUTO: 10.3 10E3/UL (ref 4–11)

## 2024-12-09 PROCEDURE — 36415 COLL VENOUS BLD VENIPUNCTURE: CPT | Performed by: INTERNAL MEDICINE

## 2024-12-09 PROCEDURE — 97530 THERAPEUTIC ACTIVITIES: CPT | Mod: GP

## 2024-12-09 PROCEDURE — 80048 BASIC METABOLIC PNL TOTAL CA: CPT | Performed by: INTERNAL MEDICINE

## 2024-12-09 PROCEDURE — 250N000013 HC RX MED GY IP 250 OP 250 PS 637: Performed by: INTERNAL MEDICINE

## 2024-12-09 PROCEDURE — 85014 HEMATOCRIT: CPT | Performed by: INTERNAL MEDICINE

## 2024-12-09 PROCEDURE — 250N000013 HC RX MED GY IP 250 OP 250 PS 637: Performed by: PHYSICIAN ASSISTANT

## 2024-12-09 PROCEDURE — 999N000065 XR THORACIC LUMBAR STANDING 2 VIEWS

## 2024-12-09 PROCEDURE — 97535 SELF CARE MNGMENT TRAINING: CPT | Mod: GO

## 2024-12-09 RX ADMIN — ACETAMINOPHEN 650 MG: 325 TABLET, FILM COATED ORAL at 06:11

## 2024-12-09 RX ADMIN — Medication 25 MCG: at 08:17

## 2024-12-09 RX ADMIN — FLUOROMETHOLONE 1 DROP: 1 SOLUTION/ DROPS OPHTHALMIC at 08:16

## 2024-12-09 RX ADMIN — ACETAMINOPHEN 650 MG: 325 TABLET, FILM COATED ORAL at 12:43

## 2024-12-09 RX ADMIN — HYDROXYZINE HYDROCHLORIDE 10 MG: 10 TABLET ORAL at 00:41

## 2024-12-09 RX ADMIN — OXYCODONE HYDROCHLORIDE 5 MG: 5 TABLET ORAL at 06:11

## 2024-12-09 RX ADMIN — Medication 1 TABLET: at 08:17

## 2024-12-09 RX ADMIN — POLYETHYLENE GLYCOL 3350 17 G: 17 POWDER, FOR SOLUTION ORAL at 08:17

## 2024-12-09 RX ADMIN — LISINOPRIL 10 MG: 10 TABLET ORAL at 08:17

## 2024-12-09 RX ADMIN — Medication 500 MG: at 08:17

## 2024-12-09 RX ADMIN — OXYCODONE HYDROCHLORIDE 10 MG: 10 TABLET ORAL at 00:41

## 2024-12-09 RX ADMIN — SENNOSIDES AND DOCUSATE SODIUM 2 TABLET: 50; 8.6 TABLET ORAL at 08:17

## 2024-12-09 RX ADMIN — Medication 1 PACKET: at 08:20

## 2024-12-09 RX ADMIN — FAMOTIDINE 20 MG: 20 TABLET ORAL at 08:17

## 2024-12-09 RX ADMIN — FOLIC ACID 1 MG: 1 TABLET ORAL at 08:17

## 2024-12-09 RX ADMIN — BRINZOLAMIDE/BRIMONIDINE TARTRATE 1 DROP: 10; 2 SUSPENSION/ DROPS OPHTHALMIC at 08:17

## 2024-12-09 RX ADMIN — OXYCODONE HYDROCHLORIDE 5 MG: 5 TABLET ORAL at 12:43

## 2024-12-09 ASSESSMENT — ACTIVITIES OF DAILY LIVING (ADL)
ADLS_ACUITY_SCORE: 39

## 2024-12-09 NOTE — PLAN OF CARE
"Goal Outcome Evaluation:      Plan of Care Reviewed With: patient    Overall Patient Progress: improvingOverall Patient Progress: improving     /68 (BP Location: Left arm)   Pulse 83   Temp 100  F (37.8  C) (Oral)   Resp 16   Ht 1.854 m (6' 1\")   Wt 86.1 kg (189 lb 13.1 oz)   SpO2 97%   BMI 25.04 kg/m     O2>90% ORA, denies feeling SOB, lightheadedness. Lungs clear, equal bilateral    Output: Voids spontaneously, denies difficulties    Last BM: 12/4   Activity: Ax1 , w/ walker and GB and back brace   Up for meals? Up in bed this evening, but not in chair   Skin: WDL X; surgical incision, CDI   Pain: Managed with PRN Tylenol, pt will call if needing more   CMS: N/T on right foot   Dressing: Back spinal dressing CDI   Diet: Reg, denies nausea and vomiting    LDA: Closed suction drain. No PIV   Plan: Plan is possible discharge tomorrow   Additional Info:  Pt PIV was removed due to infiltration, physician notified and asked if he needs a new one placed. Dr. Guzman said pt is fine for now without PIV.             "

## 2024-12-09 NOTE — PROGRESS NOTES
"Orthopedic Surgery Progress Note:     L1-pelvis fusion with TLIF/SPO L2-S1 12/4/2024 by Dr. Pedroza     Subjective:   No acute events overnight. Pain well-controlled. Tolerating a clear diet.  No new concerns or complaints.    Objective:   BP (!) 154/77 (BP Location: Left arm, Patient Position: Semi-Brewer's, Cuff Size: Adult Regular)   Pulse 79   Temp 98.1  F (36.7  C) (Oral)   Resp 17   Ht 1.854 m (6' 1\")   Wt 86.1 kg (189 lb 13.1 oz)   SpO2 98%   BMI 25.04 kg/m    No intake/output data recorded.  Gen: No acute distress, resting comfortably in bed.  Resp: Non-labored breathing  MSK:  Spine:  - Dressings c/d/i  - Drain patent with serosanguinous output, 160ml  - DP pulses 2+ bilaterally, feet wwp bilaterally                 Lumbar Spine:                          Motor -                           L2-3: Hip flexion R 5/5  And L 5/5 strength                           L3/4:  Knee extension R 5/5 and L 5/5 strength                          L4/5:  Foot dorsiflexion R 2/5 L 5/5 and                                       EHL dorsiflexion R 2/5 L 5/5 strength                          S1:  Plantarflexion/Peroneal Muscles  R 5/5 and L 5/5 strength                          Sensation: intact to light touch L3-S1 distribution BLE    Laboratory Data:  Lab Results   Component Value Date    WBC 11.5 (H) 12/07/2024    HGB 10.2 (L) 12/07/2024     12/07/2024       Images:  No new images were obtained. Ordered    Assessment & Plan:    68 yo M with PMH HTN, acquired right foot drop, history of avascular necrosis R hip s/p L1-pelvis fusion with TLIF/SPO L2-S1 12/4/2024 by Dr. Pedroza    Patient evaluated today and found well with pain under control and able to ambulate independently through room. Bandages clean with mild staining on cephalad aspect.   Patient pending X-rays today. Drains to be removed today and bandages replaced. If X-rays done and appropriate without complications patient may be discharged home from toth. "     Today:   - Pain control on PO  - Remove drains today and get Xray's.   - Discharge if no complications on X-rays       Plan:  Orthopedic Primary  Activity: Up with assist until independent. No excessive bending or twisting. No lifting >10 lbs x 6 weeks. Encourage ambulation. If Dorina lift needed, use high back sling to avoid spine flexion. Patient is falls risk, history of syncope and fluid sensitivity.   Weight bearing status: WBAT.  Pain management:   IV lidocaine: discontinue at 8am on POD#2 or earlier if complications arise.  Oxycodone PO, IV dilaudid prn, APAP, muscle relaxants. Transition from IV to PO narcotics as tolerated. Toradol x 24 hr then no NSAIDs    Antibiotics: Antibiotics x 48 hr  Diet: Begin with clear fluids and progress diet as tolerated. [place] BID. Protein shakes BID.  DVT prophylaxis: SCDs only. No chemical DVT ppx needed.  Imaging: XR Upright scoli AP/lat PTDC - ordered.  Labs: Hgb POD# 1, 2  Bracing/Splinting: [place] OOB. Ok to mobilize and work with therapy prior to brace arriving. .  Dressings: Keep dressing c/d/i x 7 days  Drains: deep and superficial hemovac. Document output per shift, will be discontinued at Orthopedic Surgery discretion.  Azevedo catheter: Remove POD#1    Physical Therapy/Occupational Therapy: Eval and treat.  Cultures: none.    Consults: Hospitalist.  Follow-up: Clinic with Dr. Pedroza in 6 weeks with repeat x-rays.   Disposition: Pending progress with therapies, pain control on orals, and medical stability, anticipate discharge to home on POD #5-7.    Donato Leach MD  Spine Fellow     PLEASE PAGE ME directly with any questions/concerns during regular weekday hours before 5 pm. If there is no response, if it is a weekend, or if it is during evening hours then please page the orthopedic surgery resident on call.    FOLLOWUP:    Future Appointments   Date Time Provider Department [place]   12/9/2024  8:15 AM Sandy Ram PT [facility] [place]   12/9/2024  2:00 PM  Sara Ferrera, OT [facility] [place]   1/16/2025  2:20 PM Chin Pedroza MD [facility] [place]

## 2024-12-09 NOTE — OP NOTE
NEUROSURGERY OPERATIVE NOTE     DATE OF PROCEDURE: 12/4/2024     PREOPERATIVE DIAGNOSES:  1.  Multilevel spinal stenosis L2-S1, most severe at L3-4, with neurogenic claudication.  2.  Right foraminal stenosis L2-3 and L3-4, with right L2/L3 radiculopathy.  3.  Left subarticular stenosis L4-5 and L5-S1; left foraminal stenosis L5-S1; with left L5/S1 radiculopathy.  4.  Lumbar scoliosis.     POSTOPERATIVE DIAGNOSES:  1.  Multilevel spinal stenosis L2-S1, most severe at L3-4, with neurogenic claudication.  2.  Right foraminal stenosis L2-3 and L3-4, with right L2/L3 radiculopathy.  3.  Left subarticular stenosis L4-5 and L5-S1; left foraminal stenosis L5-S1; with left L5/S1 radiculopathy.  4.  Lumbar scoliosis.     PROCEDURE PERFORMED:  Castaneda-Gipson osteotomies L2-L5 (3 levels).  Castaneda-Gipson osteotomy on the left L5-S1.   Bilateral inferior facetectomies (Schwab grade 1) L1-2.  4.   L2-3, L3-4, L4-5 Bilateral Transforaminal lumbar interbody fusion (TLIF) with Medtronic Capstone interbody filled with Bone Morphogenic Protein (BMP) for interbody arthrodesis.  5.   L5-S1 Unilateral Left Transforaminal lumbar interbody fusion (TLIF) with Medtronic Capstone interbody filled with Bone Morphogenic Protein (BMP) for interbody arthrodesis.  6.   L1-S1 posterior arthrodesis with autograft and allograft and BMP.    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  All these necessitated significant extra time and effort greater than 25% usual in performing posterior instrumentation, osteotomy and interbody fusion.     SURGEON  (Primary):  Chin Pedroza MD   CO-SURGEON:             Nasim Song MD, Neurosurgery.                                             Co-surgery was justified and warranted given case complexity and anticipated difficult surgery.    ASSISTANTS:                Brigette Daniel PA-C.  No resident available.     ANESTHESIA:   General Endotracheal     ESTIMATED BLOOD LOSS: 850 mL (Cell saver returned 415 mL)    INDICATIONS:  69 year old male with chronic low back pain and bilateral neurogenic claudication.  Also with longstanding right-sided foot drop and equinus deformity, with leg length discrepancy (right shorter) dating back to an acetabular fracture sustained in 1980s status post multiple hip surgeries.  Imaging revealed advanced multilevel lumbar spondylosis with degenerative scoliosis and multilevel foraminal and subarticular stenosis.  Tried nonoperative treatment, continues to have significant disabling symptoms.  I thus offered surgery in form of multilevel lumbar interbody fusion with decompression, posterior column osteotomies, with goal of decompression of neural elements, correction of sagittal and coronal deformity, and spinal stabilization.  Consented after thorough discussion of the rationale, risks, benefits and alternatives.  Discussed bone graft options; consented to use of infuse BMP, local autograft and crushed cancellous allograft.     I entered the case after all the screws were placed. Please refer to Dr. Pedroza's note for further details.     We then proceeded to perform Left L5-S1 TLIF.  Because of the dural ectasia on the right side, we decided not to perform bilateral TLIF.   Left L5-S1 Smith-Gipson osteotomy was performed using an osteotome by removing the superior and inferior articular processes. Inferior L5 spinous process and right hemilamina were removed with Kerrison punch. We then carried our resection of facet out laterally after identifying a plane between the ligamentum flavum and  dura. There was a thickened ligamentum flavum as well as large disk bulge causing severe spinal canal stenosis.  The thecal sac and L5 roots at the L5-S1 disc space was felt to be sufficiently decompressed after palpation with the nerve hook.  We then performed the discectomy and end plate preparation at L5-S1. The discectomy was performed using a combination of the pituitary rongeur and curettes.  We then prepared the endplates with the curettes.  Implants were sized and was packed with BMP and then inserted into the L5-S1 interspace for L5-S1 interbody arthrodesis.  This was verified with the xrays.    We turned our attention to the L4-5 level.   Bilateral Castaneda-Gipson osteotomies were performed using an osteotome by removing the superior and inferior articular processes. Inferior L4 spinous process and lamina were removed with Kerrison punch. We then carried our resection of facet out laterally after identifying a plane between the ligamentum flavum and dura.   L4 nerve roots were identified and decompressed.   We then performed the discectomy and end plate preparation at L4-5. The discectomy was performed using a combination of the pituitary rongeur and curettes.  We then prepared the endplates with the curettes.  Implants were sized and was packed with BMP and then inserted into the L4-5 interspace interbody arthrodesis.  This was again verified with the xrays.    Above procedure likewise performed at L3-4 and L2-3; thus, total 3 levels of TLIF-SPO, in addition to the 1 level of unilateral TLIF via a left-sided facetectomy L5-S1; thus, total 4 levels TLIF.  At L3-4 and L2-3, we used 6 degree cages.     We performed bilateral inferior facetectomies L1-2, using osteotome and rongeur; bone saved for grafting purposes.  This exposed the articular surfaces of the ascending processes.  This was performed both to achieve segmental release for deformity correction, as well as to facilitate posterior transfacet  fusion.     We used patient-specific UNID 5.5 mm cobalt chrome rods.  We cut the rods shorter on top.  Rods seated (right side first), placed set plugs starting from the bottom; used XimoXi reduction towers to gradually reduce the vaishnavi to the screw heads.  This also serve to obtain translational correction of the degenerative scoliosis.  Set plugs applied.  This process was repeated for the left side.  We cut appropriate length supplementary rods bilaterally, manually contoured using Divehi walden.  Supplementary rods seated, set plugs applied.  AP and lateral to the long films showed good coronal and sagittal alignment.  Lumbar lordosis measured 50 degrees, essentially hitting our target of 51 degrees.       Posterior fusion performed L1 to sacrum.  Posterior elements (lamina and spinous processes) decorticated using rudolph.  Bone graft (infuse BMP, local autograft and crushed cancellous allograft) placed over decorticated posterior fusion bed for L1-S1 posterior arthrodesis.    At this point, I left the procedure and Dr. Pedroza performed the wound closure.    Nasim Song MD

## 2024-12-09 NOTE — PLAN OF CARE
"Goal Outcome Evaluation:      Plan of Care Reviewed With: patient    Overall Patient Progress: improvingOverall Patient Progress: improving     Shift: [id number]  VS:/73 (BP Location: Left arm)   Pulse 79   Temp 98.4  F (36.9  C) (Oral)   Resp 16   Ht 1.854 m (6' 1\")   Wt 86.1 kg (189 lb 13.1 oz)   SpO2 98%   BMI 25.04 kg/m      Pain: Pain managed with prn Oxycodone  Neuro: A&Ox4.Makes needs known  Cardiac: WDL. Denied chest pain  Respiratory: WDL. Denied SOB, on RA  Diet/Appetite: Tolerating regular diet.   /GI: Continent of B&B.   LDA's:None  Skin: Surgical incision, CDI. Hemovac drain  Activity: Up with A X 1 with a walker and back brace  Plan: Precautions maintained / Continue with plan of care. Call light within reach.    Discharge instruction reviewed.  Patient verbalized understanding. PIV removed, patient transported via w/c to the [facility]. Pt accompanied by spouse. Patient discharged    "

## 2024-12-09 NOTE — PLAN OF CARE
"Goal Outcome Evaluation:       Vitally stable, BP (!) 154/77 (BP Location: Left arm, Patient Position: Semi-Brewer's, Cuff Size: Adult Regular)   Pulse 79   Temp 98.1  F (36.7  C) (Oral)   Resp 17   Ht 1.854 m (6' 1\")   Wt 86.1 kg (189 lb 13.1 oz)   SpO2 98%   BMI 25.04 kg/m       Alert and oriented X4.    Pain managed with oxycodone and atarax.      No IV access. Has hemovac (see output in flowsheet).    Able to make needs known.    Continue plan of care                          "

## 2024-12-10 NOTE — DISCHARGE SUMMARY
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 Please refer to that documentation for full details. Briefly, the patient has Degenerative scoliosis with neurogenic claudication. The patient has failed conservative treatment of symptoms and desires more definitive intervention. Therefore, after reviewing non-operative and operative options including the risks and benefits associated with each, the patient elected to proceed with the above stated procedure.     HOSPITAL COURSE:    The patient was admitted following the above listed procedures for pain control and rehabilitation. Kar López did well post-operatively. Medicine was consulted post operatively to aid in management of medical co-morbidities. The patient received routine nursing cares and at the time of discharge was medically stable. Vital signs were stable throughout admission. The patient is tolerating a regular diet and is voiding spontaneously. All PT/OT goals have been met for safe mobility. Pain is now controlled on oral medications which will be available on discharge. Stool softeners have been used while taking pain medications to help prevent constipation. Kar AZEVEDO  Rene is deemed medically safe to discharge.     Antibiotics:  Ancef given periop and 24 hours postop.  DVT prophylaxis:  Early ambulation  PT Progress:  Has met PT/OT goals for safe mobility.   Pain Meds:  Weaned off all IV pain meds by discharge.  Inpatient Events:  No significant postoperative events or complications.     PHYSICAL EXAM:    Gen: No acute distress, resting comfortably in bed.  Resp: Non-labored breathing  MSK:  Spine:  - Dressings c/d/i  - Drain patent with serosanguinous output, 160ml  - DP pulses 2+ bilaterally, feet wwp bilaterally                 Lumbar Spine:                          Motor -                           L2-3: Hip flexion R 5/5  And L 5/5 strength                           L3/4:  Knee extension R 5/5 and L 5/5 strength                          L4/5:  Foot dorsiflexion R 2/5 L 5/5 and                                       EHL dorsiflexion R 2/5 L 5/5 strength                          S1:  Plantarflexion/Peroneal Muscles  R 5/5 and L 5/5 strength                          Sensation: intact to light touch L3-S1 distribution BLE    FOLLOWUP:        Future Appointments   Date Time Provider Department Center   1/16/2025  2:20 PM Chin Pedroza MD [facility]       Orthopedic Surgery appointments are at the [facility] ([address] [place] [address]). Call [phone] to schedule a follow-up appointment at this location with your provider.     PLANNED DISCHARGE ORDERS:      Discharge Medication List as of 12/9/2024 12:07 PM        START taking these medications    Details   acetaminophen (TYLENOL) 325 MG tablet Take 3 tablets (975 mg) by mouth every 8 hours., OTC      menthol (ICY HOT) 5 % PTCH Apply 1 patch over 8 hours topically every 8 hours as needed for muscle soreness.OTC      Nutritional Supplements (SARATH) PACK Take 1 packet by mouth 2 times daily for 12 days. Have these supplements for 2 weeks after surgery, whenever you discharge.,  Disp-24 packet, R-0, E-Prescribe      oxyCODONE (ROXICODONE) 5 MG tablet Take 1-2 tablets (5-10 mg) by mouth every 4 hours as needed for moderate to severe pain., Disp-35 tablet, R-0, E-Prescribe      polyethylene glycol (MIRALAX) 17 GM/Dose powder Take 17 g by mouth daily., Disp-510 g, R-0, E-Prescribe      senna-docusate (SENOKOT-S/PERICOLACE) 8.6-50 MG tablet Take 2 tablets by mouth 2 times daily., OTC           CONTINUE these medications which have NOT CHANGED    Details   amoxicillin (AMOXIL) 500 MG capsule Take 4 capsules (2000mg) by mouth 1 hour prior to dental appointment, Historical      calcium citrate-vitamin D (CALCIUM CITRATE-VITAMIN D3) 315-6.25 MG-MCG TABS per tablet Take by mouth 2 times daily., Historical      cholecalciferol, vitamin D3, (VITAMIN D3) 25 mcg (1,000 unit) capsule [CHOLECALCIFEROL, VITAMIN D3, (VITAMIN D3) 25 MCG (1,000 UNIT) CAPSULE] Take 1,000 Units by mouth daily., Historical      DOXYCYCLINE PO Take by mouth daily as needed (tick bite), Historical      fish oil-omega-3 fatty acids (FISH OIL) 300-1,000 mg capsule Take 1 g by mouth 3 times daily., Historical      fluorometholone (FML LIQUIFILM) 0.1 % ophthalmic suspension Place 1 drop Into the left eye daily., Historical      folic acid (FOLVITE) 1 MG tablet Take 1 mg by mouth every morning., Historical      lisinopril (ZESTRIL) 10 MG tablet Take 10 mg by mouth every morning. Take 10mg in the morning and 5-10mg in the evening, Historical      LUTEIN PO Take 1 tablet by mouth every evening., Historical      Multiple Vitamin (MULTIVITAMIN ADULT PO) Take 1 capsule by mouth daily, Historical      niacinamide 500 MG tablet Take 500 mg by mouth daily, Historical      SIMBRINZA 1-0.2 % ophthalmic suspension SHAKE LIQUID AND INSTILL 1 DROP IN LEFT EYE TWICE DAILY, KEN, Historical      tiZANidine (ZANAFLEX) 2 MG tablet Take 2-4 mg by mouth nightly as needed, Historical      Turmeric 500 MG CAPS Take 1,000 mg by mouth daily, Historical     "  Zinc 25 MG TABS Take 1 tablet by mouth daily., Historical           STOP taking these medications       ibuprofen (ADVIL,MOTRIN) 200 MG tablet Comments:   Reason for Stopping:         meloxicam (MOBIC) 7.5 MG tablet Comments:   Reason for Stopping:         study - chlorhexidine gluconate, IDS# [id number], solution Comments:   Reason for Stopping:         study - chlorhexidine, IDS# [id number], 4% soap 5 days pre-op Comments:   Reason for Stopping:         study - mupirocin, IDS# [id number], nasal ointment Comments:   Reason for Stopping:                 Discharge Procedure Orders   Reason for your hospital stay   Order Comments: Lumbar to pelvis fusion     Brief Discharge Instructions   Order Comments: Post-operative Discharge Instructions:     WOUND CARE:  You have a dressing on your incision which can be worn for up to 7 days, and it can be worn in the shower. After the dressing has been removed, you do not need a dressing. There is \"surgical glue\" directly over the incision. This will fall off on its own, which can take up to 2 weeks. It is okay to shower and wash gently with soap and water. Do not soak in the bath. No pools, hot tubs, or lakes for 6 weeks.      After surgery, you may have a sensitive scar.  When the incision has fully healed, you may massage the scar to decrease sensitivity and help break down scar tissue. Do this up to 4 times per day.     DIET & EXERCISE:  - When you get home, you may resume your normal diet as tolerated. You may not be very hungry but try to eat small healthy meals to help you heal. Remember to drink plenty of water/fluids to help keep you hydrated.     - You will be seen in the clinic at 6 weeks following surgery. You will not need to attend physical therapy during this time. You can focus on cardiovascular fitness by walking as much as you can tolerate. Avoid bending, lifting, and twisting. Your weight lifting restriction is 10 pounds until your first follow-up appointment.       PAIN " MEDICATIONS:  For postoperative pain control, we have prescribed a variety of medications. Tylenol has been prescribed and should be taken as part of the complete pain management regimen. You may also have been prescribed a muscle relaxer to be taken as needed for muscle spasms. Other pain management techniques include icing the surgical area (for 15 minutes on, 15 minutes off) throughout the day to help with inflammation. Avoid NSAIDs (ibuprofen, Aleve, Advil, etc) for the first 12 weeks after surgery, unless approved by your surgeon.     You also received a prescription for a opioid medication.  This should be taken for the first few weeks following surgery.  As pain improves, decrease the amount you take (see tapering plan below). Opioid have numerous side effects including nausea, constipation, and drowsiness. If you experience nausea, this may be relieved by taking the medication with food or a light meal. To avoid constipation, please use an over-the-counter stool softeners and drink lots of water and eat fruits and vegetables. No operating heavy machinery or driving an automobile while on opioid medications.        Tapering opioids: As your pain symptoms improve, focus your efforts on decreasing (tapering) use of opioid medications. The most successful tapering strategy is to first, decrease the number of tablets you take every 4-6 hours to the minimum prescribed. Then, increase the amount of time between doses.  For example:  First, taper to   or 1 tablet every 4-6 hours.  Then, taper to   or 1 tablet every 6-8 hours.  Then, taper to   or 1 tablet every 8-10 hours.  Then, taper to   or 1 tablet every 10-12 hours.  Then, taper to   or 1 tablet at bedtime.  The bedtime dose can help with comfort during sleep and is typically the last dose to be discontinued after surgery.     Call the orthopedic clinic at [phone] several business days in advance of when you need a refill. Early refills will not be  "provided, and you may not take more than what is prescribed. Inform the nurse how much of the medication you are taking and how many tablets you have left.     WHEN TO CALL:  Please call or return if you experience the following:  - Fevers (temperature greater than 100.4 degrees Fahrenheit).  - Pain that is getting worse or does not respond to pain medications.  - Drainage from your wound.  - Increasing redness about the wound.  - Changes in strength or sensation to your arms/legs.   - Any other worrisome symptoms.     You may reach the clinic by dialing [phone].  After hours, you may reach the resident on call by dialing [phone].      When to call - Contact Surgeon Team   Order Comments: You may experience symptoms that require follow-up before your scheduled appointment. Refer to the \"[employer]\" for instructions on when to contact your Surgeon Team if you are concerned about pain control, blood clots, constipation, or if you are unable to urinate.     When to call - Reach out to Urgent Care   Order Comments: If you are not able to reach your Surgeon Team and you need immediate care, go to the Orthopedic Walk-in Clinic or Urgent Care at your Surgeon's office.  Do NOT go to the Emergency Room unless you have shortness of breath, chest pain, or other signs of a medical emergency.     When to call - Reasons to Call 911   Order Comments: Call 911 immediately if you experience sudden-onset chest pain, arm weakness/numbness, slurred speech, or shortness of breath     Discharge Instruction - Breathing exercises   Order Comments: Perform breathing exercises using your Incentive Spirometer 10 times per hour while awake for 2 weeks.     Symptoms - Fever Management   Order Comments: A low grade fever can be expected after surgery.  Use acetaminophen (TYLENOL) as needed for fever management.  Contact your Surgeon Team if you have a fever greater than 101.5 F, chills, and/or night sweats.     Symptoms - " Constipation management   Order Comments: Constipation (hard, dry bowel movements) is expected after surgery due to the combination of being less active, the anesthetic, and the opioid pain medication.  You can do the following to help reduce constipation:  ~  FLUIDS:  Drink clear liquids (water or Gatorade), or juice (apple/prune).  ~  DIET:  Eat a fiber rich diet.    ~  ACTIVITY:  Get up and move around several times a day.  Increase your activity as you are able.  MEDICATIONS:  Reduce the risk of constipation by starting medications before you are constipated.  You can take Miralax   (1 packet as directed) and/or a stool softener (Senokot 1-2 tablets 1-2 times a day).  If you already have constipation and these medications are not working, you can get magnesium citrate and use as directed.  If you continue to have constipation you can try an over the counter suppository or enema.  Call your Surgeon Team if it has been greater than 3 days since your last bowel movement.     Symptoms - Reduced Urine Output   Order Comments: Changes in the amount of fluids you drank before and after surgery may result in problems urinating.  It is important to stay well-hydrated after surgery and drink plenty of water. If it has been greater than 8 hours since you have urinated despite drinking plenty of water, call your Surgeon Team.     Activity - Exercises to prevent blood clots   Order Comments: Unless otherwise directed by your Surgeon team, perform the following exercises at least three times per day for the first four weeks after surgery to prevent blood clots in your legs: 1) Point and flex your feet (Ankle Pumps), 2) Move your ankle around in big circles, 3) Wiggle your toes, 4) Walk, even for short distances, several times a day, will help decrease the risk of blood clots.     Order Specific Question Answer Comments   Is discharge order? Yes      Comfort and Pain Management - Pain after Surgery   Order Comments: Pain after  surgery is normal and expected.  You will have some amount of pain for several weeks after surgery.  Your pain will improve with time.  There are several things you can do to help reduce your pain including: rest, ice, elevation, and using pain medications as needed. Contact your Surgeon Team if you have pain that persists or worsens after surgery despite rest, ice, elevation, and taking your medication(s) as prescribed. Contact your Surgeon Team if you have new numbness, tingling, or weakness in your operative extremity.     Comfort and Pain Management - Swelling after Surgery   Order Comments: Swelling and/or bruising of the surgical extremity is common and may persist for several months after surgery. In addition to frequent icing and elevation, gentle compressive support with an ACE wrap or tubigrip may help with swelling. Apply compression regularly, removing at least twice daily to perform skin checks. Contact your Surgeon Team if your swelling increases and is NOT associated with an increase in your activity level, or if your swelling increases and is associated with redness and pain.     Comfort and Pain Management - Cold therapy   Order Comments: Ice can be used to control swelling and discomfort after surgery. Place a thin towel over your operative site and apply the ice pack overtop. Leave ice pack in place for 20 minutes, then remove for 20 minutes. Repeat this 20 minutes on/20 minutes off routine as often as tolerated.     Medication Instructions - Acetaminophen (TYLENOL) Instructions   Order Comments: You were discharged with acetaminophen (TYLENOL) for pain management after surgery. Acetaminophen most effectively manages pain symptoms when it is taken on a schedule without missing doses (every four, six, or eight hours). Your Provider will prescribe a safe daily dose between 3000 - 4000 mg.  Do NOT exceed this daily dose. Most patients use acetaminophen for pain control for the first four weeks after  surgery.  You can wean from this medication as your pain decreases.     Medication Instructions - NSAID Instructions   Order Comments: Do not use NSAIDs x 12 weeks. Some common anti-inflammatories include: ibuprofen (ADVIL, MOTRIN), naproxen (ALEVE, NAPROSYN), celecoxib (CELEBREX), meloxicam (MOBIC), ketorolac (TORADOL).     Medication Instructions - Muscle relaxant Medication Instructions   Order Comments: You were discharged with a muscle relaxer medication that can be used in conjunction with acetaminophen (TYLENOL) and the opioid medication to manage pain symptoms. Take the muscle relaxant medication exactly as directed.     Follow Up Care   Order Comments: Follow-up with your Surgeon Team in 6 weeks for wound check.     Medication instructions - No pharmacologic VTE prophylaxis prescribed   Order Comments: Your Surgeon did not prescribe medication for anticoagulation.     Opioid Instructions (Greater than or equal to 65 years)   Order Comments: You were discharged with an opioid medication (hydromorphone, oxycodone, hydrocodone, or tramadol). This medication should only be taken for breakthrough pain that is not controlled with acetaminophen (TYLENOL). If you rate your pain less than 3 you do not need this medication. Pain rating 0-3: You do not need this medication Pain rating 4-6: Take 1/2 tablet every 4-6 hours as needed Pain rating 7-10: Take 1 tablet every 4-6 hours as needed Do not exceed 4 tablets per day     Medication Instructions - Opioids - Tapering Instructions   Order Comments: In the first three days following surgery, your symptoms may warrant use of the narcotic pain medication every four to six hours as prescribed. This is normal. As your pain symptoms improve, focus your efforts on decreasing (tapering) use of narcotic medications. The most successful tapering strategy is to first, decrease the number of tablets you take every 4-6 hours to the minimum prescribed. Then, increase the amount of  time between doses. For example: First, taper to   or 1 tablet every 4-6 hours. Then, taper to   or 1 tablet every 6-8 hours. Then, taper to   or 1 tablet every 8-10 hours. Then, taper to   or 1 tablet every 10-12 hours. Then, taper to   or 1 tablet at bedtime. The bedtime dose can help with comfort during sleep and is typically the last dose to be discontinued after surgery.     Walker Order     Order Specific Question Answer Comments   Medical Equipment (DME) Supplier: [employer]    PATIENT INSTRUCTIONS: If you did not receive this ordered item today, please contact [employer] for availability ([place] Locations: [phone], [place]: [phone]).    Start Date: 2024    Walker Type: Standard (2 Wheel)    Diagnosis: R26.89 - Impaired Gait and Mobility      Discharge Instruction - Regular Diet Adult   Order Comments: Return to your pre-surgery diet unless instructed otherwise     Order Specific Question Answer Comments   Is discharge order? Yes        Orthopedic surgery staff for this patient is Dr. Pedroza. Discussed.        --  Donato Leach MD  Spine Fellow

## 2024-12-18 DIAGNOSIS — M41.25 OTHER IDIOPATHIC SCOLIOSIS, THORACOLUMBAR REGION: ICD-10-CM

## 2024-12-18 RX ORDER — OXYCODONE HYDROCHLORIDE 5 MG/1
5 TABLET ORAL EVERY 6 HOURS PRN
Qty: 25 TABLET | Refills: 0 | Status: SHIPPED | OUTPATIENT
Start: 2024-12-18

## 2024-12-18 NOTE — TELEPHONE ENCOUNTER
Medication Refill Request    Has the patient contacted the pharmacy for the refill? Yes   Name of medication being requested: oxyCODONE (ROXICODONE) 5 MG tablet   Provider who prescribed the medication:   Pharmacy: [address]  [phone]  Date medication is needed: 12/19       Could we send this information to you in [facility] or would you prefer to receive a phone call?:   Patient would prefer a phone call   Okay to leave a detailed message?: Yes at Cell number on file:    Telephone Information:   Mobile [phone]

## 2024-12-19 ENCOUNTER — MYC MEDICAL ADVICE (OUTPATIENT)
Dept: ORTHOPEDICS | Facility: CLINIC | Age: 69
End: 2024-12-19
Payer: COMMERCIAL

## 2025-01-02 DIAGNOSIS — M47.817 LUMBOSACRAL SPONDYLOSIS WITHOUT MYELOPATHY: ICD-10-CM

## 2025-01-02 DIAGNOSIS — Z98.1 S/P LUMBAR FUSION: Primary | ICD-10-CM

## 2025-01-02 DIAGNOSIS — M41.25 OTHER IDIOPATHIC SCOLIOSIS, THORACOLUMBAR REGION: ICD-10-CM

## 2025-01-02 DIAGNOSIS — M41.50 DEGENERATIVE SCOLIOSIS IN ADULT PATIENT: ICD-10-CM

## 2025-01-16 ENCOUNTER — OFFICE VISIT (OUTPATIENT)
Dept: ORTHOPEDICS | Facility: CLINIC | Age: 70
End: 2025-01-16
Payer: COMMERCIAL

## 2025-01-16 ENCOUNTER — ANCILLARY PROCEDURE (OUTPATIENT)
Dept: GENERAL RADIOLOGY | Facility: CLINIC | Age: 70
End: 2025-01-16
Attending: ORTHOPAEDIC SURGERY
Payer: COMMERCIAL

## 2025-01-16 DIAGNOSIS — M41.50 DEGENERATIVE SCOLIOSIS IN ADULT PATIENT: ICD-10-CM

## 2025-01-16 DIAGNOSIS — Z98.1 S/P LUMBAR FUSION: Primary | ICD-10-CM

## 2025-01-16 DIAGNOSIS — M41.25 OTHER IDIOPATHIC SCOLIOSIS, THORACOLUMBAR REGION: ICD-10-CM

## 2025-01-16 DIAGNOSIS — Z98.1 S/P LUMBAR FUSION: ICD-10-CM

## 2025-01-16 DIAGNOSIS — M47.817 LUMBOSACRAL SPONDYLOSIS WITHOUT MYELOPATHY: ICD-10-CM

## 2025-01-16 PROCEDURE — 72082 X-RAY EXAM ENTIRE SPI 2/3 VW: CPT | Performed by: STUDENT IN AN ORGANIZED HEALTH CARE EDUCATION/TRAINING PROGRAM

## 2025-01-16 PROCEDURE — 77073 BONE LENGTH STUDIES: CPT | Performed by: STUDENT IN AN ORGANIZED HEALTH CARE EDUCATION/TRAINING PROGRAM

## 2025-01-16 RX ORDER — OXYCODONE HYDROCHLORIDE 5 MG/1
5 TABLET ORAL
Qty: 12 TABLET | Refills: 0 | Status: SHIPPED | OUTPATIENT
Start: 2025-01-16
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
## 2025-01-22 ENCOUNTER — THERAPY VISIT (OUTPATIENT)
Dept: PHYSICAL THERAPY | Facility: REHABILITATION | Age: 70
End: 2025-01-22
Attending: ORTHOPAEDIC SURGERY
Payer: MEDICARE

## 2025-01-22 DIAGNOSIS — Z98.1 S/P LUMBAR FUSION: ICD-10-CM

## 2025-01-22 PROCEDURE — 97161 PT EVAL LOW COMPLEX 20 MIN: CPT | Mod: GP | Performed by: PHYSICAL THERAPIST

## 2025-01-22 PROCEDURE — 97110 THERAPEUTIC EXERCISES: CPT | Mod: GP | Performed by: PHYSICAL THERAPIST
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
## 2025-01-29 ENCOUNTER — THERAPY VISIT (OUTPATIENT)
Dept: PHYSICAL THERAPY | Facility: REHABILITATION | Age: 70
End: 2025-01-29
Attending: ORTHOPAEDIC SURGERY
Payer: COMMERCIAL

## 2025-01-29 DIAGNOSIS — Z98.1 S/P LUMBAR FUSION: Primary | ICD-10-CM

## 2025-01-29 PROCEDURE — 97110 THERAPEUTIC EXERCISES: CPT | Mod: GP

## 2025-01-29 PROCEDURE — 97116 GAIT TRAINING THERAPY: CPT | Mod: GP

## 2025-02-05 DIAGNOSIS — M41.50 DEGENERATIVE SCOLIOSIS IN ADULT PATIENT: ICD-10-CM

## 2025-02-05 DIAGNOSIS — M47.817 LUMBOSACRAL SPONDYLOSIS WITHOUT MYELOPATHY: ICD-10-CM

## 2025-02-05 DIAGNOSIS — Z98.1 S/P LUMBAR FUSION: Primary | ICD-10-CM

## 2025-02-05 DIAGNOSIS — M41.25 OTHER IDIOPATHIC SCOLIOSIS, THORACOLUMBAR REGION: ICD-10-CM

## 2025-02-05 DIAGNOSIS — M48.062 LUMBAR STENOSIS WITH NEUROGENIC CLAUDICATION: ICD-10-CM

## 2025-02-07 PROBLEM — Z98.1 S/P LUMBAR FUSION: Status: ACTIVE | Noted: 2025-02-07

## 2025-02-12 ENCOUNTER — THERAPY VISIT (OUTPATIENT)
Dept: PHYSICAL THERAPY | Facility: REHABILITATION | Age: 70
End: 2025-02-12
Payer: COMMERCIAL

## 2025-02-12 DIAGNOSIS — Z98.1 S/P LUMBAR FUSION: Primary | ICD-10-CM

## 2025-02-12 PROCEDURE — 97110 THERAPEUTIC EXERCISES: CPT | Mod: GP

## 2025-02-19 ENCOUNTER — THERAPY VISIT (OUTPATIENT)
Dept: PHYSICAL THERAPY | Facility: REHABILITATION | Age: 70
End: 2025-02-19
Payer: MEDICARE

## 2025-02-19 DIAGNOSIS — Z98.1 S/P LUMBAR FUSION: Primary | ICD-10-CM

## 2025-02-19 PROCEDURE — 97110 THERAPEUTIC EXERCISES: CPT | Mod: GP

## 2025-02-19 NOTE — PROGRESS NOTES
PLAN  Continue therapy per current plan of care.    Beginning/End Dates of Progress Note Reporting Period:  -01/22/2025 to 02/19/2025    Referring Provider:  Chin Pedroza           02/19/25 0500   Appointment Info   Signing clinician's name / credentials Andres Melara, LIZET   Visits Used 5   Medical Diagnosis S/P lumbar Fusion DOS: 12/4/2024   PT Tx Diagnosis core weakness; impaired balance; Leg Weakness bilaterally; decreased endurance   Precautions/Limitations Wear TLSO brace when up. No B/L/T. At 3 months post op, able to go without brace. no lifting >20#. Will be following with ortho.  Accompanied by spouse (Dottie).   Other pertinent information sees ortho next on 2/25/2025   Progress Note/Certification   Start of Care Date 01/22/25   Onset of illness/injury or Date of Surgery 12/04/24   Therapy Frequency weekly   Predicted Duration 12 weeks   Certification date from 01/22/25   Certification date to 04/21/25   Progress Note Due Date 03/19/25   Progress Note Completed Date 02/19/25   GOALS   PT Goals 2;3;4   PT Goal 1   Goal Identifier walking   Goal Description Patient will walk >900 feet with least restrictive assistive device for community ambulation   Rationale to maximize safety and independence within the community   Goal Progress Over 1000 feet without assistive device and with brace   Target Date 04/21/25   Date Met 02/19/25   PT Goal 2   Goal Identifier exercises at [facility]   Goal Description patient will resume exercise routine at [facility] for healthy lifestyle   Rationale to maximize safety and independence within the community;to maximize safety and independence with performance of ADLs and functional tasks   Goal Progress Has returned to [facility], but is still quite limited in activities   Target Date 04/21/25   PT Goal 3   Goal Identifier sit<>stand   Goal Description patient will safely perform hip hinge motion and be able to perform sit<>stand without brace required for safety with transfers  "once surgical restrictions are lifted   Rationale to maximize safety and independence within the home   Goal Progress Still wearing brace; surgical follow-up on 2/25/25   Target Date 04/21/25   PT Goal 4   Goal Identifier oswestry   Goal Description patient will score <50% on PATRICE deomnstrating reduce level of disability d/t his back surgery   Rationale to maximize safety and independence with performance of ADLs and functional tasks;to maximize safety and independence within the home;to maximize safety and independence within the community   Goal Progress 17.78%   Target Date 04/21/25   Date Met 02/19/25   Subjective Report   Subjective Report Kar reports that he continues to see progress, \"but it's baby steps.\"   Objective Measure 1   Objective Measure Worst Pain   Details Since last visit: 5-6/10 (one night; otherwise lower)   Treatment Interventions (PT)   Interventions Therapeutic Procedure/Exercise   Therapeutic Procedure/Exercise   Therapeutic Procedures: strength, endurance, ROM, flexibility minutes (62851) 40   Ther Proc 1 Recumbent bike + subjective report   Ther Proc 1 - Details 7 minutes. See subjective report.   Ther Proc 4 Heavily modified plank against wall (per patient request)   Ther Proc 4 - Details Toes 12 inches from wall: 1 x 15 seconds. Toes 18 inces from wall: 3 x 20 seconds. Cueing throughout to maintain neutral spine (avoid lumbar lordosis) and core brace.   Ther Proc 5 TA set on Swiss ball exercises   Ther Proc 5 - Details Firm ground, narrow base of support, arms overhead: 3 x 30 seconds balance (cueing to keep breathing and maintain core brace).Left knee started to bother him sitting on ball, so transitioned to standing chop/lift.   Ther Proc 6 Counter squats   Ther Proc 6 - Details Mod: 2 x 15 (continued cueing to avoid anterior knee translation)   Ther Proc 7 Chop/lift   Ther Proc 7 - Details Firm ground, 2 x 12 on each side (2 pounds; continued cueing to maintain core brace and keep " "breathing)   Skilled Intervention Exercises to increase core, low back, and hip/lower extremity flexbility/mobility and strength/stability   Patient Response/Progress Added Nustep, chop/lift, and pallof press; increased reps. Kar tolerated all exercises and progressions well, though he continues to require frequent cueing for appropriate intensity/range.   Ther Proc 8 Pallof press   Ther Proc 8 - Details 2 x 10 each side (green TheraBand)   Plan   Home program PTRx   Plan for next session Continue to progress core, low back, and hip/lower extremity flexibility/mobility and strength/stability exercises as tolerated.  Progress balance and continued gait training as appropriate.  Trial manual therapy as appropriate.   Comments   Comments Assessment: Kar continues to report steady improvement, though as \"baby steps.\" He presents to physical therapy today ambulating without assistive device, he notes that he has been trying to do that more for short distances in safe conditions. Therapy today focused primarily on progressions of his core awareness/control/stability exercises, and he reported good tolerance throughout. He is now able to walk for up to 1000+ feet without assistive device, and he has also started to go to the [facility] to exercise, but his activities are still significantly limited. The PATRICE was assessed today, and he scored a 17.78%, indicating minimally impaired function, though he is still on precautions, and his perception of function could change when he is cleared for higher-level activities. He will continue to benefit from physical therapy improve his function and activity tolerance.   Total Session Time   Timed Code Treatment Minutes 40   Total Treatment Time (sum of timed and untimed services) 40       "

## 2025-02-25 ENCOUNTER — ANCILLARY PROCEDURE (OUTPATIENT)
Dept: GENERAL RADIOLOGY | Facility: CLINIC | Age: 70
End: 2025-02-25
Attending: PHYSICIAN ASSISTANT
Payer: COMMERCIAL

## 2025-02-25 ENCOUNTER — OFFICE VISIT (OUTPATIENT)
Dept: ORTHOPEDICS | Facility: CLINIC | Age: 70
End: 2025-02-25
Payer: COMMERCIAL

## 2025-02-25 DIAGNOSIS — M48.062 LUMBAR STENOSIS WITH NEUROGENIC CLAUDICATION: ICD-10-CM

## 2025-02-25 DIAGNOSIS — M41.25 OTHER IDIOPATHIC SCOLIOSIS, THORACOLUMBAR REGION: ICD-10-CM

## 2025-02-25 DIAGNOSIS — M41.50 DEGENERATIVE SCOLIOSIS IN ADULT PATIENT: ICD-10-CM

## 2025-02-25 DIAGNOSIS — Z98.1 S/P LUMBAR FUSION: ICD-10-CM

## 2025-02-25 DIAGNOSIS — Z98.1 S/P LUMBAR FUSION: Primary | ICD-10-CM

## 2025-02-25 DIAGNOSIS — M47.817 LUMBOSACRAL SPONDYLOSIS WITHOUT MYELOPATHY: ICD-10-CM

## 2025-02-25 DIAGNOSIS — M40.36 FLATBACK SYNDROME OF LUMBAR REGION: ICD-10-CM

## 2025-02-25 PROCEDURE — 72170 X-RAY EXAM OF PELVIS: CPT | Performed by: RADIOLOGY

## 2025-02-25 PROCEDURE — 99024 POSTOP FOLLOW-UP VISIT: CPT | Performed by: PHYSICIAN ASSISTANT

## 2025-02-25 PROCEDURE — 72100 X-RAY EXAM L-S SPINE 2/3 VWS: CPT | Performed by: STUDENT IN AN ORGANIZED HEALTH CARE EDUCATION/TRAINING PROGRAM

## 2025-02-25 RX ORDER — CYCLOBENZAPRINE HCL 10 MG
10 TABLET ORAL 3 TIMES DAILY PRN
Qty: 40 TABLET | Refills: 0 | Status: SHIPPED | OUTPATIENT
Start: 2025-02-25

## 2025-02-25 NOTE — NURSING NOTE
Reason For Visit:   Chief Complaint   Patient presents with    RECHECK     DOS: 12/4/2024 (Dr. Pedroza)  - Posterior Instrumented Spinal Fusion Lumbar 1 to Pelvis; Transforaminal Lumbar Interbody Fusion with Castaneda-Gipson Osteotomy Lumbar 2-Sacral 1 (4 levels); Use of Infuse Bone Morphogenetic Protein Large kit and Allograft     Patient states he is having trouble sleeping. He states he has always had trouble sleeping however since surgery it's worse     Primary MD: Sajan Clarke    Ref. MD: Established      Date of surgery: 12/4/2024    Type of surgery: Posterior Instrumented Spinal Fusion Lumbar 1 to Pelvis; Transforaminal Lumbar Interbody Fusion with Castaneda-Gipson Osteotomy Lumbar 2-Sacral 1 (4 levels); Use of Infuse Bone Morphogenetic Protein Large kit and Allograft .    There were no vitals taken for this visit.    Pain Assessment  Patient Currently in Pain: Yes  Patient's Stated Pain Goal: 3  0-10 Pain Scale: 3  Primary Pain Location: Back    Oswestry (PATRICE) Questionnaire        2/20/2025     5:56 PM   OSWESTRY DISABILITY INDEX   Count 8    Sum 12    Oswestry Score (%) 30 %        Patient-reported            Neck Disability Index (NDI) Questionnaire         No data to display                       Visual Analog Pain Scale  Back Pain Scale 0-10: 3  Right leg pain: 0  Left leg pain: 0  Neck Pain Scale 0-10: 2  Right arm pain: 0  Left arm pain: 0    Promis 10 Assessment        2/20/2025     6:00 PM   PROMIS 10   In general, would you say your health is: Good   In general, would you say your quality of life is: Good   In general, how would you rate your physical health? Good   In general, how would you rate your mental health, including your mood and your ability to think? Good   In general, how would you rate your satisfaction with your social activities and relationships? Good   In general, please rate how well you carry out your usual social activities and roles Good   To what extent are you able to carry  out your everyday physical activities such as walking, climbing stairs, carrying groceries, or moving a chair? A little   In the past 7 days, how often have you been bothered by emotional problems such as feeling anxious, depressed, or irritable? Sometimes   In the past 7 days, how would you rate your fatigue on average? Moderate   In the past 7 days, how would you rate your pain on average, where 0 means no pain, and 10 means worst imaginable pain? 4   In general, would you say your health is: 3   In general, would you say your quality of life is: 3   In general, how would you rate your physical health? 3   In general, how would you rate your mental health, including your mood and your ability to think? 3   In general, how would you rate your satisfaction with your social activities and relationships? 3   In general, please rate how well you carry out your usual social activities and roles. (This includes activities at home, at work and in your community, and responsibilities as a parent, child, spouse, employee, friend, etc.) 3   To what extent are you able to carry out your everyday physical activities such as walking, climbing stairs, carrying groceries, or moving a chair? 2   In the past 7 days, how often have you been bothered by emotional problems such as feeling anxious, depressed, or irritable? 3   In the past 7 days, how would you rate your fatigue on average? 3   In the past 7 days, how would you rate your pain on average, where 0 means no pain, and 10 means worst imaginable pain? 4   Global Mental Health Score 12    Global Physical Health Score 11    PROMIS TOTAL - SUBSCORES 23        Patient-reported                Oriana Fernandez CMA
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
## 2025-02-26 ENCOUNTER — THERAPY VISIT (OUTPATIENT)
Dept: PHYSICAL THERAPY | Facility: REHABILITATION | Age: 70
End: 2025-02-26
Payer: COMMERCIAL

## 2025-02-26 DIAGNOSIS — Z98.1 S/P LUMBAR FUSION: Primary | ICD-10-CM

## 2025-02-26 PROCEDURE — 97110 THERAPEUTIC EXERCISES: CPT | Mod: GP

## 2025-03-04 ENCOUNTER — TRANSFERRED RECORDS (OUTPATIENT)
Dept: HEALTH INFORMATION MANAGEMENT | Facility: CLINIC | Age: 70
End: 2025-03-04
Payer: MEDICARE

## 2025-03-12 ENCOUNTER — THERAPY VISIT (OUTPATIENT)
Dept: PHYSICAL THERAPY | Facility: REHABILITATION | Age: 70
End: 2025-03-12
Payer: COMMERCIAL

## 2025-03-12 DIAGNOSIS — Z98.1 S/P LUMBAR FUSION: Primary | ICD-10-CM

## 2025-03-19 ENCOUNTER — THERAPY VISIT (OUTPATIENT)
Dept: PHYSICAL THERAPY | Facility: REHABILITATION | Age: 70
End: 2025-03-19
Payer: COMMERCIAL

## 2025-03-19 DIAGNOSIS — Z98.1 S/P LUMBAR FUSION: Primary | ICD-10-CM

## 2025-03-19 PROCEDURE — 97112 NEUROMUSCULAR REEDUCATION: CPT | Mod: GP

## 2025-03-19 PROCEDURE — 97110 THERAPEUTIC EXERCISES: CPT | Mod: GP

## 2025-03-19 NOTE — PROGRESS NOTES
"    PLAN  Continue therapy per current plan of care.    Beginning/End Dates of Progress Note Reporting Period:  02/19/2025 to 03/19/2025    Referring Provider:  Chin Pedroza           03/19/25 0500   Appointment Info   Signing clinician's name / credentials Andres Melara, LIZET   Visits Used 8   Medical Diagnosis S/P lumbar Fusion DOS: 12/4/2024   PT Tx Diagnosis core weakness; impaired balance; Leg Weakness bilaterally; decreased endurance   Precautions/Limitations Per 3-month surgical follow-up: wean off brace, lift up to 30 pounds, return to normal activities within tolerance and \"commonsense approach,\" may return to swimming without flip turns. Accompanied by spouse (Dottie).   Other pertinent information sees ortho next on 2/25/2025   Progress Note/Certification   Start of Care Date 01/22/25   Onset of illness/injury or Date of Surgery 12/04/24   Therapy Frequency weekly   Predicted Duration 12 weeks   Certification date from 01/22/25   Certification date to 04/21/25   Progress Note Due Date 04/21/25   Progress Note Completed Date 03/19/25   GOALS   PT Goals 2;3;4   PT Goal 1   Goal Identifier walking   Goal Description Patient will walk >900 feet with least restrictive assistive device for community ambulation   Rationale to maximize safety and independence within the community   Goal Progress Over 1000 feet without assistive device and with brace   Target Date 04/21/25   Date Met 02/19/25   PT Goal 2   Goal Identifier exercises at [facility]   Goal Description patient will resume exercise routine at [facility] for healthy lifestyle   Rationale to maximize safety and independence within the community;to maximize safety and independence with performance of ADLs and functional tasks   Goal Progress Has returned to [facility], but still limits his activities   Target Date 04/21/25   PT Goal 3   Goal Identifier sit<>stand   Goal Description patient will safely perform hip hinge motion and be able to perform sit<>stand " "without brace required for safety with transfers once surgical restrictions are lifted   Rationale to maximize safety and independence within the home   Goal Progress Able to perform safely and pain-free without brace   Target Date 25   Date Met 25   PT Goal 4   Goal Identifier oswestry   Goal Description patient will score <50% on PATRICE deomnstrating reduce level of disability d/t his back surgery   Rationale to maximize safety and independence with performance of ADLs and functional tasks;to maximize safety and independence within the home;to maximize safety and independence within the community   Goal Progress 17.78%   Target Date 25   Date Met 25   Subjective Report   Subjective Report Kar continues to wean off the brace, and now only wears it when he goes outside his home. He also notes good tolerance to his exercises at [facility]" though he hasn't progressed to full level.   Objective Measure 1   Objective Measure Worst Pain   Details Since last visit: -6/10   Treatment Interventions (PT)   Interventions Therapeutic Procedure/Exercise   Therapeutic Procedure/Exercise   Therapeutic Procedures: strength, endurance, ROM, flexibility minutes (02904) 27   Ther Proc 1 Nustep + subjective report   Ther Proc 1 - Details 6 minutes. See subjective report.   Ther Proc 3 Lower trunk rotation   Ther Proc 3 - Details Hooklyin minutes, alternating side-to-side (continued cueing for gentle range within tolerance and controlled rate of performance)   Ther Proc 4 TA set+   Ther Proc 4 - Details With leg extension: 2 x 12+ on each side (cueing to keep breathing, maintain core brace, and avoid lumbar extension during leg extension)   Ther Proc 6 Clam shell   Ther Proc 6 - Details 2 x 12+ on each side (cueing to avoid posterior trunk rotation)   Ther Proc 7 Bridge   Ther Proc 7 - Details 2 x 12+ (cueing to avoid lumbar extension past neutral and to actively engage glutes)   Skilled Intervention Exercises " "to increase core, low back, and hip/lower extremity flexbility/mobility and strength/stability   Patient Response/Progress Added bridge and clam shell; progressed TA set. Kar tolerated all exercises and progressions well with cueing for proper form.   Neuromuscular Re-education   Neuromuscular re-ed of mvmt, balance, coord, kinesthetic sense, posture, proprioception minutes (44598) 11   Neuro Re-ed 1 Blaze Pods: random balance - \"small line\" configuration (4 pods)   Neuro Re-ed 1 - Details Right foot 1st trial: 13 hits, average reaction time 2147 ms., 00:30:00. Left foot 1st trial: 18 hits, average reaction time 1504 ms., 00:30:00. 1st trial: low guard; CGA; no additional challenge. Right foot 2nd trial: 16 hits, average reaction time 1675 ms., 00:30:00. Left foot 2nd trial: 19 hits, average reaction time 1504 ms., 00:30:00. Right foot 3rd trial: 14 hits, average reaction time 1930 ms., 00:30:00. Left foot 3rd trial:  hits, average reaction time 1504 ms., 00:30:00. 2nd and 3rd trial: arms across chest; CGA; no additional challenge; occasional \"hip bump\" on bar to stabilize. Cueing througout for focus on control/stability over speed.   Skilled Intervention Blaze Pods to increase balance, proprioception, and coordination/control   Patient Response/Progress Response as noted above. All tasks performed in parallel bars with a gait belt for safety.   Education   Education Comments Per patient questioning, brief education provided that he can trial machines at [facility] for his knee as long as they don't aggravate his back.   Plan   Home program PTRx   Plan for next session Continue to progress core, low back, and hip/lower extremity flexibility/mobility and strength/stability exercises as tolerated.  Continue Blaze Pods.  Trial manual therapy as appropriate.   Comments   Comments Assessment: Kar continues to wean off of his brace with good tolerance, and he is now able to perform a sit-to-stand transfer without the " brace, with good form, and pain-free. He has already showed significant improvement as evidenced by an improved (decreased) PATRICE score, and he continues to report improving tolerance to his desired daily and recreational activities. However, he is understandablystill fairly limited in his pariticipation in desired gym exercises. He remains highly motivated to participate and will continue to benefit from physical therapy to improve his function and activity tolerance.

## 2025-03-25 ENCOUNTER — TELEPHONE (OUTPATIENT)
Dept: ORTHOPEDICS | Facility: CLINIC | Age: 70
End: 2025-03-25
Payer: MEDICARE

## 2025-03-25 NOTE — TELEPHONE ENCOUNTER
Patient Contacted for writer to reschedule appt with Kristy Powell on 5/27. Patient stated he was at the dentist office and will have to speak to his wife about rescheduling. Patient will call back to reschedule 5/27 appt to next available

## 2025-03-26 ENCOUNTER — THERAPY VISIT (OUTPATIENT)
Dept: PHYSICAL THERAPY | Facility: REHABILITATION | Age: 70
End: 2025-03-26
Payer: COMMERCIAL

## 2025-03-26 DIAGNOSIS — Z98.1 S/P LUMBAR FUSION: Primary | ICD-10-CM

## 2025-03-26 PROCEDURE — 97112 NEUROMUSCULAR REEDUCATION: CPT | Mod: GP

## 2025-03-26 PROCEDURE — 97110 THERAPEUTIC EXERCISES: CPT | Mod: GP

## 2025-04-02 ENCOUNTER — THERAPY VISIT (OUTPATIENT)
Dept: PHYSICAL THERAPY | Facility: REHABILITATION | Age: 70
End: 2025-04-02
Payer: MEDICARE

## 2025-04-02 DIAGNOSIS — Z98.1 S/P LUMBAR FUSION: Primary | ICD-10-CM

## 2025-04-02 PROCEDURE — 97110 THERAPEUTIC EXERCISES: CPT | Mod: GP

## 2025-04-02 PROCEDURE — 97530 THERAPEUTIC ACTIVITIES: CPT | Mod: GP

## 2025-04-02 NOTE — PROGRESS NOTES
"    [facility]                                                                                   OUTPATIENT PHYSICAL THERAPY    PLAN OF TREATMENT FOR OUTPATIENT REHABILITATION   Patient's Last Name, First Name, Kar Malik YOB: 1955   Provider's Name   [facility]   Medical Record No.  [id number]     Onset Date: 12/04/24  Start of Care Date: 01/22/25     Medical Diagnosis:  S/P lumbar Fusion DOS: 12/4/2024      PT Treatment Diagnosis:  core weakness; impaired balance; Leg Weakness bilaterally; decreased endurance Plan of Treatment  Frequency/Duration: Every other week/ 10 weeks    Certification date from 04/02/25 to 06/11/25         See note for plan of treatment details and functional goals     Andres Melara, PT                         I CERTIFY THE NEED FOR THESE SERVICES FURNISHED UNDER        THIS PLAN OF TREATMENT AND WHILE UNDER MY CARE     (Physician attestation of this document indicates review and certification of the therapy plan).              Referring Provider:  Chin Pedroza    Initial Assessment  See Epic Evaluation- Start of Care Date: 01/22/25 04/02/25 0500   Appointment Info   Signing clinician's name / credentials Andres Melara, PT   Visits Used 10   Medical Diagnosis S/P lumbar Fusion DOS: 12/4/2024   PT Tx Diagnosis core weakness; impaired balance; Leg Weakness bilaterally; decreased endurance   Precautions/Limitations Per 3-month surgical follow-up: wean off brace, lift up to 30 pounds, return to normal activities within tolerance and \"commonsense approach,\" may return to swimming without flip turns. Accompanied by spouse (Dottie).   Other pertinent information sees ortho next in June   Progress Note/Certification   Start of Care Date 01/22/25   Onset of illness/injury or Date of Surgery 12/04/24   Therapy Frequency Every other week   Predicted Duration 10 weeks   Certification date " from 04/02/25   Certification date to 06/11/25   Progress Note Due Date 04/21/25   Progress Note Completed Date 03/19/25   GOALS   PT Goals 2;3;4   PT Goal 1   Goal Identifier walking   Goal Description Patient will walk >900 feet with least restrictive assistive device for community ambulation   Rationale to maximize safety and independence within the community   Goal Progress Over 1000 feet without assistive device and with brace   Target Date 04/21/25   Date Met 02/19/25   PT Goal 2   Goal Identifier exercises at [facility]   Goal Description patient will resume exercise routine at [facility] for healthy lifestyle   Rationale to maximize safety and independence within the community;to maximize safety and independence with performance of ADLs and functional tasks   Goal Progress Has returned to [facility], but still limits his activities   Target Date 06/11/25   PT Goal 3   Goal Identifier sit<>stand   Goal Description patient will safely perform hip hinge motion and be able to perform sit<>stand without brace required for safety with transfers once surgical restrictions are lifted   Rationale to maximize safety and independence within the home   Goal Progress Able to perform safely and pain-free without brace   Target Date 04/21/25   Date Met 03/19/25   PT Goal 4   Goal Identifier oswestry   Goal Description patient will score <50% on PATRICE deomnstrating reduce level of disability d/t his back surgery   Rationale to maximize safety and independence with performance of ADLs and functional tasks;to maximize safety and independence within the home;to maximize safety and independence within the community   Goal Progress 17.78%   Target Date 04/21/25   Date Met 02/19/25   Subjective Report   Subjective Report Kar reports that some days are better than others. He doesn't feel like he has completely   Objective Measure 1   Objective Measure Worst Pain   Details Not assessed today.   Treatment Interventions (PT)   Interventions Therapeutic  Procedure/Exercise   Therapeutic Procedure/Exercise   Therapeutic Procedures: strength, endurance, ROM, flexibility minutes (39462) 24   Ther Proc 1 Nustep + subjective report + discussion regarding progress and plan of care   Ther Proc 1 - Details 13 minutes (level 7). See subjective report and assessment.   Ther Proc 7 Bridge with abduction isometric/with adduction isometric   Ther Proc 7 - Details 1 x 10 with 5-second holds each (abduction isometric with patient-provided exercise resistance band from home)   Skilled Intervention Exercises to increase core, low back, and hip/lower extremity flexbility/mobility and strength/stability   Patient Response/Progress Added bridge variations and increased time on Nustep. Kar tolerated all exercises and progressions well with cueing for proper form.   Therapeutic Activity   Therapeutic Activities: dynamic activities to improve functional performance minutes (94246) 15   Ther Act 1 Bending and lifting training   Ther Act 1 - Details Bending and lifting training/education regarding the following: Keep weight close to body; and at the hips and keep back neutral; keep chest up and avoid lumbar flexion; it down to half kneeling if needed; use a pad, grabber, or other device as needed to minimize poor positioning and knee discomfort; use of upper extremities on counter or other sturdy object, etcetera.  Trialed various different techniques with weight up to 10 pounds.  Patient demonstrates increased tolerance to half kneeling to stand transfer while kneeling on his right knee, and therapist advised patient to use this method whenever possible to increase function.   Skilled Intervention Bending and lifting training to increase tolerance to and safety and independence with functional activities   Patient Response/Progress Response as noted above.  Kar Sinclair verbalized understanding of education.   Plan   Home program PTRx   Plan for next session Continue to progress core, low  "back, and hip/lower extremity flexibility/mobility and strength/stability exercises as tolerated.  Continue Blaze Pods.  Trial manual therapy as appropriate.   Comments   Comments Assessment: Kar reports feeling like his progress is slowing, and he and his wife have questions about frequency of continued physical therapy visits.  After discussion, therapist and patient decided to continue with visits every other week at this point for up to 10 weeks leading up to his next surgical follow-up.  Therapist educated the patient that the most important thing he can do at this point is to stay active and continue reintroducing them progressing desired activities at home within tolerance, surgeons precautions (30-pound lifting restriction), and \"commonsense.\"  Patient reported that one of his biggest limitations at this point is getting down to and up from the floor and picking up objects from the floor, so related training, problem-solving, and practice was completed today (further details above).  He remains motivated to participate and will continue to benefit from physical therapy to improve his function and activity tolerance.  His goals and plan of care have been updated accordingly.   Total Session Time   Timed Code Treatment Minutes 39   Total Treatment Time (sum of timed and untimed services) 39       "

## 2025-04-16 ENCOUNTER — THERAPY VISIT (OUTPATIENT)
Dept: PHYSICAL THERAPY | Facility: REHABILITATION | Age: 70
End: 2025-04-16
Payer: COMMERCIAL

## 2025-04-16 DIAGNOSIS — Z98.1 S/P LUMBAR FUSION: Primary | ICD-10-CM

## 2025-04-16 PROCEDURE — 97112 NEUROMUSCULAR REEDUCATION: CPT | Mod: GP

## 2025-04-16 PROCEDURE — 97110 THERAPEUTIC EXERCISES: CPT | Mod: GP

## 2025-04-30 ENCOUNTER — THERAPY VISIT (OUTPATIENT)
Dept: PHYSICAL THERAPY | Facility: REHABILITATION | Age: 70
End: 2025-04-30
Payer: COMMERCIAL

## 2025-04-30 DIAGNOSIS — Z98.1 S/P LUMBAR FUSION: Primary | ICD-10-CM

## 2025-04-30 PROCEDURE — 97140 MANUAL THERAPY 1/> REGIONS: CPT | Mod: GP

## 2025-04-30 PROCEDURE — 97110 THERAPEUTIC EXERCISES: CPT | Mod: GP

## 2025-04-30 NOTE — PROGRESS NOTES
"    PLAN  Discharge pending 1-month trial of HEP as outlined below.    Beginning/End Dates of Progress Note Reporting Period:  04/02/2025 to 04/30/2025    Referring Provider:  Chin Pedroza           04/30/25 0500   Appointment Info   Signing clinician's name / credentials Andres Melara, LIZET   Visits Used 12   Medical Diagnosis S/P lumbar Fusion DOS: 12/4/2024   PT Tx Diagnosis core weakness; impaired balance; Leg Weakness bilaterally; decreased endurance   Precautions/Limitations Per 3-month surgical follow-up: wean off brace, lift up to 30 pounds, return to normal activities within tolerance and \"commonsense approach,\" may return to swimming without flip turns. Accompanied by spouse (Dottie).   Other pertinent information sees ortho next in June   Progress Note/Certification   Start of Care Date 01/22/25   Onset of illness/injury or Date of Surgery 12/04/24   Therapy Frequency Every other week   Predicted Duration 10 weeks   Certification date from 04/02/25   Certification date to 06/11/25   Progress Note Due Date 06/11/25   Progress Note Completed Date 04/30/25   GOALS   PT Goals 2;3;4   PT Goal 1   Goal Identifier walking   Goal Description Patient will walk >900 feet with least restrictive assistive device for community ambulation   Rationale to maximize safety and independence within the community   Goal Progress Over 1000 feet without assistive device and with brace   Target Date 04/21/25   Date Met 02/19/25   PT Goal 2   Goal Identifier exercises at [facility]   Goal Description patient will resume exercise routine at [facility] for healthy lifestyle   Rationale to maximize safety and independence within the community;to maximize safety and independence with performance of ADLs and functional tasks   Goal Progress Is still modifying his activities at the [facility]   Target Date 06/11/25   PT Goal 3   Goal Identifier sit<>stand   Goal Description patient will safely perform hip hinge motion and be able to perform " sit<>stand without brace required for safety with transfers once surgical restrictions are lifted   Rationale to maximize safety and independence within the home   Goal Progress Able to perform safely and pain-free without brace   Target Date 04/21/25   Date Met 03/19/25   PT Goal 4   Goal Identifier oswestry   Goal Description patient will score <50% on PATRICE deomnstrating reduce level of disability d/t his back surgery   Rationale to maximize safety and independence with performance of ADLs and functional tasks;to maximize safety and independence within the home;to maximize safety and independence within the community   Goal Progress 17.78%   Target Date 04/21/25   Date Met 02/19/25   Subjective Report   Subjective Report Kar reports that his knee injection hasn't really started to provide any benefit yet. He feels like he has started   Objective Measure 1   Objective Measure Worst Pain (back)   Details Since last visit: 5/10   Treatment Interventions (PT)   Interventions Therapeutic Procedure/Exercise   Therapeutic Procedure/Exercise   Therapeutic Procedures: strength, endurance, ROM, flexibility minutes (80477) 17   Ther Proc 1 Nustep + subjective report + discussion regarding goals/progress and plan of care   Ther Proc 1 - Details 7 minutes (level 5-7). See subjective report (to monitor progress and guide treatment), goals, and assessment.   Ther Proc 2 Seated hip abduction (knee roll outs)   Ther Proc 2 - Details 2 x 12 (resistance band provided by patient, unknown brand)   Ther Proc 3 Discharge planning and discussion regarding continuation of HEP.   Skilled Intervention Exercises to increase core, low back, and hip/lower extremity flexbility/mobility and strength/stability   Patient Response/Progress Kar tolerated all exercises and progressions well without increased symptoms.   Manual Therapy   Manual Therapy: Mobilization, MFR, MLD, friction massage minutes (25967) 10   Manual Therapy 1 Soft tissue  mobilization (STM)   Manual Therapy 1 - Details STM with moderate pressure to bilateral lumbar and lower thoracici paraspinals in right side-lying   Skilled Intervention STM to decrease pain and tissue restrictions   Patient Response/Progress Kar reported largely unchanged symptoms with manual therapy.   Plan   Home program PTRx   Updates to plan of care Discharge following next surgical follow-up/HEP trial.   Comments   Comments Assessment: Kar continues to report significant improvement since initial evaluation, though he feels like he has plateaued more recently. He has met all of his functional and other goals other than return to prior level of function with gym activities, with which he is still exercising caution. His walking tolerance, sit-to-stand performance, and PATRICE scores all demonstrate significant improvement since initial evaluation, indicating improved function and activity tolerance. He now verbalizes desire to trial a home exercise/maintenane program until his next surgical follow-up (6/3/25), and therapist feels this is appropriate. If he does not have a visit scheduled by mid-June, he will be formally discharged.   Total Session Time   Timed Code Treatment Minutes 27   Total Treatment Time (sum of timed and untimed services) 27

## 2025-05-27 DIAGNOSIS — M41.25 OTHER IDIOPATHIC SCOLIOSIS, THORACOLUMBAR REGION: ICD-10-CM
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
## 2025-06-03 ENCOUNTER — ANCILLARY PROCEDURE (OUTPATIENT)
Dept: GENERAL RADIOLOGY | Facility: CLINIC | Age: 70
End: 2025-06-03
Attending: PHYSICIAN ASSISTANT
Payer: COMMERCIAL

## 2025-06-03 ENCOUNTER — OFFICE VISIT (OUTPATIENT)
Dept: ORTHOPEDICS | Facility: CLINIC | Age: 70
End: 2025-06-03
Payer: COMMERCIAL

## 2025-06-03 DIAGNOSIS — Z98.1 S/P LUMBAR FUSION: Primary | ICD-10-CM

## 2025-06-03 DIAGNOSIS — M41.25 OTHER IDIOPATHIC SCOLIOSIS, THORACOLUMBAR REGION: ICD-10-CM

## 2025-06-03 DIAGNOSIS — M41.50 DEGENERATIVE SCOLIOSIS IN ADULT PATIENT: ICD-10-CM

## 2025-06-03 PROCEDURE — 72100 X-RAY EXAM L-S SPINE 2/3 VWS: CPT | Performed by: STUDENT IN AN ORGANIZED HEALTH CARE EDUCATION/TRAINING PROGRAM

## 2025-06-03 PROCEDURE — 72170 X-RAY EXAM OF PELVIS: CPT | Performed by: RADIOLOGY

## 2025-06-03 RX ORDER — CYCLOBENZAPRINE HCL 10 MG
10 TABLET ORAL 3 TIMES DAILY PRN
Qty: 40 TABLET | Refills: 2 | Status: SHIPPED | OUTPATIENT
Start: 2025-06-03
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
## 2025-06-13 PROBLEM — Z00.00 HEALTH CARE MAINTENANCE: Status: ACTIVE | Noted: 2025-06-13

## 2025-06-13 PROCEDURE — 82465 ASSAY BLD/SERUM CHOLESTEROL: CPT | Performed by: INTERNAL MEDICINE

## 2025-06-13 PROCEDURE — G0103 PSA SCREENING: HCPCS | Performed by: INTERNAL MEDICINE

## 2025-06-13 PROCEDURE — 80048 BASIC METABOLIC PNL TOTAL CA: CPT | Performed by: INTERNAL MEDICINE

## 2025-06-16 ENCOUNTER — DOCUMENTATION ONLY (OUTPATIENT)
Dept: OTHER | Facility: CLINIC | Age: 70
End: 2025-06-16
Payer: COMMERCIAL

## (undated) DEVICE — SUTURE VICRYL+ 1 CT-1 CR 8X18" VIO VCP741D

## (undated) DEVICE — BLADE CLIPPER DISP 4406

## (undated) DEVICE — LINEN BACK PACK 5440

## (undated) DEVICE — SUTURE VICRYL+ 2-0 CT-2 27" UND VCP269H

## (undated) DEVICE — LINEN TOWEL PACK X30 5481

## (undated) DEVICE — SUTURE VICRYL+ 2-0 CT-2 CR 8X18" VCP726D

## (undated) DEVICE — TOOL DISSECT MIDAS MR8 14CM MATCH HEAD 3MM MR8-14MH30

## (undated) DEVICE — GLOVE BIOGEL PI MICRO SZ 8.0 48580

## (undated) DEVICE — SU MONOCRYL 3-0 PS-2 18" UND MCP497G

## (undated) DEVICE — CELL SAVER

## (undated) DEVICE — Device

## (undated) DEVICE — PREP CHLORAPREP 26ML TINTED HI-LITE ORANGE 930815

## (undated) DEVICE — SOL NACL 0.9% IRRIG 1000ML BOTTLE 2F7124

## (undated) DEVICE — SU VICRYL 0 CT-1 27" UND J260H

## (undated) DEVICE — SPONGE SURGIFOAM 100 1974

## (undated) DEVICE — DRAPE LAP W/ARMBOARD 29410

## (undated) DEVICE — SOL ISOPROPYL RUBBING ALCOHOL USP 70% 4OZ HDX-20 I0020

## (undated) DEVICE — ESU GROUND PAD UNIVERSAL W/O CORD

## (undated) DEVICE — DRAPE O ARM TUBE 9732722

## (undated) DEVICE — NEEDLE SPINAL DISP 18GA X 3.5" QUINCKE 333350

## (undated) DEVICE — SOL WATER IRRIG 1000ML BOTTLE 2F7114

## (undated) DEVICE — POSITIONER ARMBOARD FOAM 1PAIR LF FP-ARMB1

## (undated) DEVICE — KIT PATIENT CARE PROAXIS SYSTEM 6988-PV-ACP

## (undated) DEVICE — RX SURGIFLO HEMOSTATIC MATRIX W/THROMBIN 8ML 2994

## (undated) DEVICE — SYR 03ML LL W/O NDL 309657

## (undated) DEVICE — DRAIN ROUND W/RESERV KIT JACKSON PRATT 10FR 400ML SU130-402D

## (undated) DEVICE — DRSG DRAIN 2X2" 7087

## (undated) DEVICE — DRAPE MAYO STAND 23X54 8337

## (undated) DEVICE — MARKER SPHERES PASSIVE MEDT PACK 5 8801075

## (undated) DEVICE — IOM SUPPLIES/CASE FEE

## (undated) DEVICE — DRSG TEGADERM 2 3/8X2 3/4" 1624W

## (undated) DEVICE — GLOVE BIOGEL PI MICRO INDICATOR UNDERGLOVE SZ 8.5 48985

## (undated) RX ORDER — KETAMINE HYDROCHLORIDE 100 MG/ML
INJECTION, SOLUTION INTRAMUSCULAR; INTRAVENOUS
Status: DISPENSED
Start: 2024-12-04

## (undated) RX ORDER — ACETAMINOPHEN 325 MG/1
TABLET ORAL
Status: DISPENSED
Start: 2024-12-04

## (undated) RX ORDER — FENTANYL CITRATE 0.05 MG/ML
INJECTION, SOLUTION INTRAMUSCULAR; INTRAVENOUS
Status: DISPENSED
Start: 2024-12-04

## (undated) RX ORDER — FENTANYL CITRATE 50 UG/ML
INJECTION, SOLUTION INTRAMUSCULAR; INTRAVENOUS
Status: DISPENSED
Start: 2024-12-04

## (undated) RX ORDER — CEFAZOLIN SODIUM/WATER 2 G/20 ML
SYRINGE (ML) INTRAVENOUS
Status: DISPENSED
Start: 2024-12-04

## (undated) RX ORDER — VANCOMYCIN HYDROCHLORIDE 1 G/20ML
INJECTION, POWDER, LYOPHILIZED, FOR SOLUTION INTRAVENOUS
Status: DISPENSED
Start: 2024-12-04

## (undated) RX ORDER — SODIUM CHLORIDE 9 MG/ML
INJECTION, SOLUTION INTRAVENOUS
Status: DISPENSED
Start: 2024-12-04

## (undated) RX ORDER — HYDROMORPHONE HYDROCHLORIDE 1 MG/ML
INJECTION, SOLUTION INTRAMUSCULAR; INTRAVENOUS; SUBCUTANEOUS
Status: DISPENSED
Start: 2024-12-04